# Patient Record
Sex: FEMALE | Race: BLACK OR AFRICAN AMERICAN | NOT HISPANIC OR LATINO | Employment: UNEMPLOYED | URBAN - METROPOLITAN AREA
[De-identification: names, ages, dates, MRNs, and addresses within clinical notes are randomized per-mention and may not be internally consistent; named-entity substitution may affect disease eponyms.]

---

## 2020-07-09 ENCOUNTER — TELEPHONE (OUTPATIENT)
Dept: NEUROLOGY | Facility: CLINIC | Age: 20
End: 2020-07-09

## 2020-07-09 NOTE — TELEPHONE ENCOUNTER
Told mother our medicaid panel is closed.              ----- Message from Cortney Hanks sent at 7/9/2020  9:35 AM CDT -----  Regarding: Call Back  Name of Who is Calling : Yusuf (Mother)    Yusuf (Mother) is requesting a call from staff in regards to being seen for seizures and getting established as a new patient sooner than what is available    .....Please contact to further discuss and advise.    Can the clinic reply by [facility] : No    What Number to Call Back :  [phone]

## 2020-09-28 ENCOUNTER — TELEPHONE (OUTPATIENT)
Dept: NEUROLOGY | Facility: CLINIC | Age: 20
End: 2020-09-28

## 2020-09-28 NOTE — TELEPHONE ENCOUNTER
----- Message from Michelle Vasquez sent at 9/28/2020  2:19 PM CDT -----  Contact: [facility]  - mother  Pts mother is asking for a call back in regards to scheduling the pt.  The pt is new to the dept and suffers with seizures.      I was not able to schedule the pt due to no availability     Contact info- [phone]    Left message on voicemail, patient is medicaid without a [facility] PCP. She will have to establish with an [facility] PCP before we can see her

## 2020-12-11 ENCOUNTER — TELEPHONE (OUTPATIENT)
Dept: NEUROLOGY | Facility: CLINIC | Age: 20
End: 2020-12-11

## 2021-03-02 ENCOUNTER — TELEPHONE (OUTPATIENT)
Dept: NEUROLOGY | Facility: CLINIC | Age: 21
End: 2021-03-02

## 2021-03-15 ENCOUNTER — TELEPHONE (OUTPATIENT)
Dept: NEUROLOGY | Facility: CLINIC | Age: 21
End: 2021-03-15

## 2021-03-17 ENCOUNTER — TELEPHONE (OUTPATIENT)
Dept: NEUROLOGY | Facility: CLINIC | Age: 21
End: 2021-03-17

## 2021-04-13 ENCOUNTER — TELEPHONE (OUTPATIENT)
Dept: NEUROLOGY | Facility: CLINIC | Age: 21
End: 2021-04-13

## 2021-05-05 ENCOUNTER — TELEPHONE (OUTPATIENT)
Dept: NEUROLOGY | Facility: CLINIC | Age: 21
End: 2021-05-05

## 2021-07-06 ENCOUNTER — PATIENT MESSAGE (OUTPATIENT)
Dept: ADMINISTRATIVE | Facility: HOSPITAL | Age: 21
End: 2021-07-06

## 2021-08-18 ENCOUNTER — DOCUMENTATION ONLY (OUTPATIENT)
Dept: NEUROLOGY | Facility: CLINIC | Age: 21
End: 2021-08-18

## 2021-08-18 ENCOUNTER — HOSPITAL ENCOUNTER (EMERGENCY)
Facility: HOSPITAL | Age: 21
Discharge: HOME | End: 2021-08-18
Attending: EMERGENCY MEDICINE
Payer: MEDICAID

## 2021-08-18 ENCOUNTER — TELEPHONE (OUTPATIENT)
Dept: NEUROLOGY | Facility: HOSPITAL | Age: 21
End: 2021-08-18

## 2021-08-18 ENCOUNTER — OFFICE VISIT (OUTPATIENT)
Dept: NEUROLOGY | Facility: CLINIC | Age: 21
End: 2021-08-18
Payer: MEDICAID

## 2021-08-18 VITALS
HEART RATE: 90 BPM | SYSTOLIC BLOOD PRESSURE: 133 MMHG | DIASTOLIC BLOOD PRESSURE: 82 MMHG | RESPIRATION RATE: 13 BRPM | WEIGHT: 119 LBS | OXYGEN SATURATION: 100 % | BODY MASS INDEX: 24.04 KG/M2 | TEMPERATURE: 98 F

## 2021-08-18 VITALS
BODY MASS INDEX: 24.09 KG/M2 | DIASTOLIC BLOOD PRESSURE: 88 MMHG | SYSTOLIC BLOOD PRESSURE: 125 MMHG | HEIGHT: 59 IN | HEART RATE: 84 BPM

## 2021-08-18 DIAGNOSIS — G40.219 LOCALIZATION-RELATED (FOCAL) (PARTIAL) SYMPTOMATIC EPILEPSY AND EPILEPTIC SYNDROMES WITH COMPLEX PARTIAL SEIZURES, INTRACTABLE, WITHOUT STATUS EPILEPTICUS: Primary | ICD-10-CM

## 2021-08-18 DIAGNOSIS — G43.111 INTRACTABLE MIGRAINE WITH AURA WITH STATUS MIGRAINOSUS: ICD-10-CM

## 2021-08-18 DIAGNOSIS — R56.9 SEIZURE: Primary | ICD-10-CM

## 2021-08-18 DIAGNOSIS — G80.9 CEREBRAL PALSY, UNSPECIFIED TYPE: ICD-10-CM

## 2021-08-18 DIAGNOSIS — G81.14 LEFT SPASTIC HEMIPARESIS: ICD-10-CM

## 2021-08-18 DIAGNOSIS — G91.9 HYDROCEPHALUS, UNSPECIFIED TYPE: ICD-10-CM

## 2021-08-18 DIAGNOSIS — Z98.2 S/P VP SHUNT: ICD-10-CM

## 2021-08-18 LAB
ALBUMIN SERPL BCP-MCNC: 4.3 G/DL (ref 3.5–5.2)
ALP SERPL-CCNC: 54 U/L (ref 55–135)
ALT SERPL W/O P-5'-P-CCNC: 7 U/L (ref 10–44)
ANION GAP SERPL CALC-SCNC: 11 MMOL/L (ref 8–16)
AST SERPL-CCNC: 10 U/L (ref 10–40)
B-HCG UR QL: NEGATIVE
BACTERIA #/AREA URNS AUTO: ABNORMAL /HPF
BASOPHILS # BLD AUTO: 0.03 K/UL (ref 0–0.2)
BASOPHILS NFR BLD: 0.4 % (ref 0–1.9)
BILIRUB SERPL-MCNC: 0.2 MG/DL (ref 0.1–1)
BILIRUB UR QL STRIP: NEGATIVE
BUN SERPL-MCNC: 9 MG/DL (ref 6–20)
BUN SERPL-MCNC: 9 MG/DL (ref 6–30)
CALCIUM SERPL-MCNC: 9.7 MG/DL (ref 8.7–10.5)
CHLORIDE SERPL-SCNC: 108 MMOL/L (ref 95–110)
CHLORIDE SERPL-SCNC: 109 MMOL/L (ref 95–110)
CLARITY UR REFRACT.AUTO: ABNORMAL
CO2 SERPL-SCNC: 24 MMOL/L (ref 23–29)
COLOR UR AUTO: YELLOW
CREAT SERPL-MCNC: 0.6 MG/DL (ref 0.5–1.4)
CREAT SERPL-MCNC: 0.7 MG/DL (ref 0.5–1.4)
CTP QC/QA: YES
CTP QC/QA: YES
DIFFERENTIAL METHOD: ABNORMAL
EOSINOPHIL # BLD AUTO: 0 K/UL (ref 0–0.5)
EOSINOPHIL NFR BLD: 0.2 % (ref 0–8)
ERYTHROCYTE [DISTWIDTH] IN BLOOD BY AUTOMATED COUNT: 19.9 % (ref 11.5–14.5)
EST. GFR  (AFRICAN AMERICAN): >60 ML/MIN/1.73 M^2
EST. GFR  (NON AFRICAN AMERICAN): >60 ML/MIN/1.73 M^2
GLUCOSE SERPL-MCNC: 108 MG/DL (ref 70–110)
GLUCOSE SERPL-MCNC: 113 MG/DL (ref 70–110)
GLUCOSE UR QL STRIP: NEGATIVE
HCT VFR BLD AUTO: 33.4 % (ref 37–48.5)
HCT VFR BLD CALC: 36 %PCV (ref 36–54)
HGB BLD-MCNC: 10.8 G/DL (ref 12–16)
HGB UR QL STRIP: ABNORMAL
HYALINE CASTS UR QL AUTO: 0 /LPF
IMM GRANULOCYTES # BLD AUTO: 0.02 K/UL (ref 0–0.04)
IMM GRANULOCYTES NFR BLD AUTO: 0.2 % (ref 0–0.5)
KETONES UR QL STRIP: ABNORMAL
LEUKOCYTE ESTERASE UR QL STRIP: NEGATIVE
LIPASE SERPL-CCNC: 20 U/L (ref 4–60)
LYMPHOCYTES # BLD AUTO: 1.4 K/UL (ref 1–4.8)
LYMPHOCYTES NFR BLD: 17.7 % (ref 18–48)
MCH RBC QN AUTO: 21.8 PG (ref 27–31)
MCHC RBC AUTO-ENTMCNC: 32.3 G/DL (ref 32–36)
MCV RBC AUTO: 68 FL (ref 82–98)
MICROSCOPIC COMMENT: ABNORMAL
MONOCYTES # BLD AUTO: 0.5 K/UL (ref 0.3–1)
MONOCYTES NFR BLD: 5.8 % (ref 4–15)
NEUTROPHILS # BLD AUTO: 6.2 K/UL (ref 1.8–7.7)
NEUTROPHILS NFR BLD: 75.7 % (ref 38–73)
NITRITE UR QL STRIP: NEGATIVE
NRBC BLD-RTO: 0 /100 WBC
PH UR STRIP: 7 [PH] (ref 5–8)
PLATELET # BLD AUTO: 273 K/UL (ref 150–450)
PMV BLD AUTO: 10.9 FL (ref 9.2–12.9)
POC IONIZED CALCIUM: 1.17 MMOL/L (ref 1.06–1.42)
POC TCO2 (MEASURED): 22 MMOL/L (ref 23–29)
POTASSIUM BLD-SCNC: 3.7 MMOL/L (ref 3.5–5.1)
POTASSIUM SERPL-SCNC: 3.6 MMOL/L (ref 3.5–5.1)
PROT SERPL-MCNC: 7.9 G/DL (ref 6–8.4)
PROT UR QL STRIP: ABNORMAL
RBC # BLD AUTO: 4.95 M/UL (ref 4–5.4)
RBC #/AREA URNS AUTO: >100 /HPF (ref 0–4)
SAMPLE: ABNORMAL
SARS-COV-2 RDRP RESP QL NAA+PROBE: NEGATIVE
SODIUM BLD-SCNC: 142 MMOL/L (ref 136–145)
SODIUM SERPL-SCNC: 143 MMOL/L (ref 136–145)
SP GR UR STRIP: 1.02 (ref 1–1.03)
SQUAMOUS #/AREA URNS AUTO: 1 /HPF
URN SPEC COLLECT METH UR: ABNORMAL
WBC # BLD AUTO: 8.14 K/UL (ref 3.9–12.7)
WBC #/AREA URNS AUTO: 5 /HPF (ref 0–5)

## 2021-08-18 PROCEDURE — 96361 HYDRATE IV INFUSION ADD-ON: CPT

## 2021-08-18 PROCEDURE — 86803 HEPATITIS C AB TEST: CPT | Performed by: EMERGENCY MEDICINE

## 2021-08-18 PROCEDURE — 99284 EMERGENCY DEPT VISIT MOD MDM: CPT | Mod: CS,,, | Performed by: EMERGENCY MEDICINE

## 2021-08-18 PROCEDURE — 80047 BASIC METABLC PNL IONIZED CA: CPT

## 2021-08-18 PROCEDURE — 25000003 PHARM REV CODE 250: Performed by: EMERGENCY MEDICINE

## 2021-08-18 PROCEDURE — 63600175 PHARM REV CODE 636 W HCPCS: Performed by: EMERGENCY MEDICINE

## 2021-08-18 PROCEDURE — 96374 THER/PROPH/DIAG INJ IV PUSH: CPT

## 2021-08-18 PROCEDURE — 87389 HIV-1 AG W/HIV-1&-2 AB AG IA: CPT | Performed by: EMERGENCY MEDICINE

## 2021-08-18 PROCEDURE — 93010 EKG 12-LEAD: ICD-10-PCS | Mod: ,,, | Performed by: INTERNAL MEDICINE

## 2021-08-18 PROCEDURE — 93005 ELECTROCARDIOGRAM TRACING: CPT

## 2021-08-18 PROCEDURE — 99285 EMERGENCY DEPT VISIT HI MDM: CPT | Mod: 25,27

## 2021-08-18 PROCEDURE — 99205 OFFICE O/P NEW HI 60 MIN: CPT | Mod: S$PBB,,, | Performed by: PSYCHIATRY & NEUROLOGY

## 2021-08-18 PROCEDURE — 99999 PR PBB SHADOW E&M-EST. PATIENT-LVL III: ICD-10-PCS | Mod: PBBFAC,,, | Performed by: PSYCHIATRY & NEUROLOGY

## 2021-08-18 PROCEDURE — 80183 DRUG SCRN QUANT OXCARBAZEPIN: CPT | Performed by: EMERGENCY MEDICINE

## 2021-08-18 PROCEDURE — 81001 URINALYSIS AUTO W/SCOPE: CPT | Performed by: EMERGENCY MEDICINE

## 2021-08-18 PROCEDURE — 99205 PR OFFICE/OUTPT VISIT, NEW, LEVL V, 60-74 MIN: ICD-10-PCS | Mod: S$PBB,,, | Performed by: PSYCHIATRY & NEUROLOGY

## 2021-08-18 PROCEDURE — 99284 PR EMERGENCY DEPT VISIT,LEVEL IV: ICD-10-PCS | Mod: CS,,, | Performed by: EMERGENCY MEDICINE

## 2021-08-18 PROCEDURE — 85025 COMPLETE CBC W/AUTO DIFF WBC: CPT | Performed by: EMERGENCY MEDICINE

## 2021-08-18 PROCEDURE — 81025 URINE PREGNANCY TEST: CPT | Performed by: EMERGENCY MEDICINE

## 2021-08-18 PROCEDURE — 99213 OFFICE O/P EST LOW 20 MIN: CPT | Mod: PBBFAC,25 | Performed by: PSYCHIATRY & NEUROLOGY

## 2021-08-18 PROCEDURE — 99999 PR PBB SHADOW E&M-EST. PATIENT-LVL III: CPT | Mod: PBBFAC,,, | Performed by: PSYCHIATRY & NEUROLOGY

## 2021-08-18 PROCEDURE — 80053 COMPREHEN METABOLIC PANEL: CPT | Performed by: EMERGENCY MEDICINE

## 2021-08-18 PROCEDURE — U0002 COVID-19 LAB TEST NON-CDC: HCPCS | Performed by: EMERGENCY MEDICINE

## 2021-08-18 PROCEDURE — 93010 ELECTROCARDIOGRAM REPORT: CPT | Mod: ,,, | Performed by: INTERNAL MEDICINE

## 2021-08-18 PROCEDURE — 80177 DRUG SCRN QUAN LEVETIRACETAM: CPT | Performed by: EMERGENCY MEDICINE

## 2021-08-18 PROCEDURE — 83690 ASSAY OF LIPASE: CPT | Performed by: EMERGENCY MEDICINE

## 2021-08-18 RX ORDER — OXCARBAZEPINE 60 MG/ML
600 SUSPENSION ORAL 2 TIMES DAILY
Qty: 1800 ML | Refills: 3 | Status: SHIPPED | OUTPATIENT
Start: 2021-08-18 | End: 2022-07-22 | Stop reason: SDUPTHER

## 2021-08-18 RX ORDER — FOLIC ACID 1 MG/1
1 TABLET ORAL DAILY
Qty: 90 TABLET | Refills: 3 | Status: SHIPPED | OUTPATIENT
Start: 2021-08-18 | End: 2022-08-15 | Stop reason: SDUPTHER

## 2021-08-18 RX ORDER — ONDANSETRON 4 MG/1
4 TABLET, ORALLY DISINTEGRATING ORAL EVERY 6 HOURS PRN
Qty: 12 TABLET | Refills: 0 | Status: SHIPPED | OUTPATIENT
Start: 2021-08-18 | End: 2021-08-18 | Stop reason: SDUPTHER

## 2021-08-18 RX ORDER — LEVETIRACETAM 100 MG/ML
1000 SOLUTION ORAL 2 TIMES DAILY
Qty: 1800 ML | Refills: 3 | Status: SHIPPED | OUTPATIENT
Start: 2021-08-18 | End: 2021-08-19 | Stop reason: SDUPTHER

## 2021-08-18 RX ORDER — MIDAZOLAM 5 MG/.1ML
5 SPRAY NASAL EVERY 5 MIN PRN
Qty: 4 EACH | Refills: 3 | Status: SHIPPED | OUTPATIENT
Start: 2021-08-18 | End: 2022-07-07 | Stop reason: ALTCHOICE

## 2021-08-18 RX ORDER — ONDANSETRON 4 MG/1
4 TABLET, ORALLY DISINTEGRATING ORAL EVERY 6 HOURS PRN
Qty: 12 TABLET | Refills: 0 | Status: SHIPPED | OUTPATIENT
Start: 2021-08-18 | End: 2021-08-20

## 2021-08-18 RX ORDER — ONDANSETRON 2 MG/ML
4 INJECTION INTRAMUSCULAR; INTRAVENOUS
Status: COMPLETED | OUTPATIENT
Start: 2021-08-18 | End: 2021-08-18

## 2021-08-18 RX ADMIN — ONDANSETRON 4 MG: 2 INJECTION INTRAMUSCULAR; INTRAVENOUS at 04:08

## 2021-08-18 RX ADMIN — SODIUM CHLORIDE 1000 ML: 0.9 INJECTION, SOLUTION INTRAVENOUS at 04:08

## 2021-08-19 ENCOUNTER — TELEPHONE (OUTPATIENT)
Dept: NEUROLOGY | Facility: HOSPITAL | Age: 21
End: 2021-08-19

## 2021-08-19 DIAGNOSIS — G40.219 LOCALIZATION-RELATED (FOCAL) (PARTIAL) SYMPTOMATIC EPILEPSY AND EPILEPTIC SYNDROMES WITH COMPLEX PARTIAL SEIZURES, INTRACTABLE, WITHOUT STATUS EPILEPTICUS: ICD-10-CM

## 2021-08-19 LAB — HIV 1+2 AB+HIV1 P24 AG SERPL QL IA: NEGATIVE

## 2021-08-19 RX ORDER — LEVETIRACETAM 100 MG/ML
1000 SOLUTION ORAL 2 TIMES DAILY
Qty: 1800 ML | Refills: 3 | Status: SHIPPED | OUTPATIENT
Start: 2021-08-19 | End: 2022-08-15 | Stop reason: SDUPTHER

## 2021-08-20 ENCOUNTER — TELEPHONE (OUTPATIENT)
Dept: NEUROSURGERY | Facility: CLINIC | Age: 21
End: 2021-08-20

## 2021-08-20 LAB — HCV AB SERPL QL IA: NEGATIVE

## 2021-08-21 LAB — OXCARBAZEPINE METABOLITE: 27 MCG/ML (ref 10–35)

## 2021-08-23 LAB — LEVETIRACETAM SERPL-MCNC: 9.8 UG/ML (ref 3–60)

## 2021-09-04 ENCOUNTER — PATIENT MESSAGE (OUTPATIENT)
Dept: NEUROLOGY | Facility: CLINIC | Age: 21
End: 2021-09-04

## 2021-09-04 DIAGNOSIS — G40.219 LOCALIZATION-RELATED (FOCAL) (PARTIAL) SYMPTOMATIC EPILEPSY AND EPILEPTIC SYNDROMES WITH COMPLEX PARTIAL SEIZURES, INTRACTABLE, WITHOUT STATUS EPILEPTICUS: Primary | ICD-10-CM

## 2021-10-04 ENCOUNTER — PATIENT MESSAGE (OUTPATIENT)
Dept: ADMINISTRATIVE | Facility: HOSPITAL | Age: 21
End: 2021-10-04

## 2021-10-08 ENCOUNTER — TELEPHONE (OUTPATIENT)
Dept: NEUROLOGY | Facility: CLINIC | Age: 21
End: 2021-10-08

## 2021-10-15 ENCOUNTER — TELEPHONE (OUTPATIENT)
Dept: NEUROSURGERY | Facility: CLINIC | Age: 21
End: 2021-10-15

## 2021-10-18 ENCOUNTER — OFFICE VISIT (OUTPATIENT)
Dept: NEUROSURGERY | Facility: CLINIC | Age: 21
End: 2021-10-18
Payer: MEDICAID

## 2021-10-18 ENCOUNTER — HOSPITAL ENCOUNTER (EMERGENCY)
Facility: HOSPITAL | Age: 21
Discharge: HOME | End: 2021-10-18
Attending: EMERGENCY MEDICINE
Payer: MEDICAID

## 2021-10-18 VITALS
RESPIRATION RATE: 18 BRPM | SYSTOLIC BLOOD PRESSURE: 139 MMHG | HEART RATE: 67 BPM | WEIGHT: 119 LBS | DIASTOLIC BLOOD PRESSURE: 74 MMHG | HEIGHT: 59 IN | OXYGEN SATURATION: 100 % | TEMPERATURE: 98 F | BODY MASS INDEX: 23.99 KG/M2

## 2021-10-18 DIAGNOSIS — R11.10 VOMITING, INTRACTABILITY OF VOMITING NOT SPECIFIED, PRESENCE OF NAUSEA NOT SPECIFIED, UNSPECIFIED VOMITING TYPE: ICD-10-CM

## 2021-10-18 DIAGNOSIS — G81.14 LEFT SPASTIC HEMIPARESIS: ICD-10-CM

## 2021-10-18 DIAGNOSIS — G91.9 HYDROCEPHALUS, UNSPECIFIED TYPE: Primary | ICD-10-CM

## 2021-10-18 DIAGNOSIS — Z98.2 VP (VENTRICULOPERITONEAL) SHUNT STATUS: Primary | ICD-10-CM

## 2021-10-18 DIAGNOSIS — G80.9 CEREBRAL PALSY, UNSPECIFIED TYPE: ICD-10-CM

## 2021-10-18 DIAGNOSIS — G40.909 NONINTRACTABLE EPILEPSY WITHOUT STATUS EPILEPTICUS, UNSPECIFIED EPILEPSY TYPE: ICD-10-CM

## 2021-10-18 DIAGNOSIS — G43.111 INTRACTABLE MIGRAINE WITH AURA WITH STATUS MIGRAINOSUS: ICD-10-CM

## 2021-10-18 PROCEDURE — 99204 PR OFFICE/OUTPT VISIT, NEW, LEVL IV, 45-59 MIN: ICD-10-PCS | Mod: S$PBB,,, | Performed by: STUDENT IN AN ORGANIZED HEALTH CARE EDUCATION/TRAINING PROGRAM

## 2021-10-18 PROCEDURE — 99284 EMERGENCY DEPT VISIT MOD MDM: CPT | Mod: 25

## 2021-10-18 PROCEDURE — 99999 PR PBB SHADOW E&M-EST. PATIENT-LVL III: CPT | Mod: PBBFAC,,, | Performed by: STUDENT IN AN ORGANIZED HEALTH CARE EDUCATION/TRAINING PROGRAM

## 2021-10-18 PROCEDURE — 99204 OFFICE O/P NEW MOD 45 MIN: CPT | Mod: S$PBB,,, | Performed by: STUDENT IN AN ORGANIZED HEALTH CARE EDUCATION/TRAINING PROGRAM

## 2021-10-18 PROCEDURE — 99284 PR EMERGENCY DEPT VISIT,LEVEL IV: ICD-10-PCS | Mod: ,,, | Performed by: EMERGENCY MEDICINE

## 2021-10-18 PROCEDURE — 25000003 PHARM REV CODE 250

## 2021-10-18 PROCEDURE — 99284 EMERGENCY DEPT VISIT MOD MDM: CPT | Mod: ,,, | Performed by: EMERGENCY MEDICINE

## 2021-10-18 PROCEDURE — 99213 OFFICE O/P EST LOW 20 MIN: CPT | Mod: PBBFAC,25,27 | Performed by: STUDENT IN AN ORGANIZED HEALTH CARE EDUCATION/TRAINING PROGRAM

## 2021-10-18 PROCEDURE — 99999 PR PBB SHADOW E&M-EST. PATIENT-LVL III: ICD-10-PCS | Mod: PBBFAC,,, | Performed by: STUDENT IN AN ORGANIZED HEALTH CARE EDUCATION/TRAINING PROGRAM

## 2021-10-18 RX ORDER — ACETAMINOPHEN 160 MG/5ML
325 SOLUTION ORAL
Status: DISCONTINUED | OUTPATIENT
Start: 2021-10-18 | End: 2021-10-18

## 2021-10-18 RX ORDER — ACETAMINOPHEN 160 MG/5ML
325 SOLUTION ORAL
Status: COMPLETED | OUTPATIENT
Start: 2021-10-18 | End: 2021-10-18

## 2021-10-18 RX ADMIN — ACETAMINOPHEN 326.4 MG: 160 SUSPENSION ORAL at 02:10

## 2021-11-09 ENCOUNTER — TELEPHONE (OUTPATIENT)
Dept: NEUROLOGY | Facility: HOSPITAL | Age: 21
End: 2021-11-09
Payer: MEDICAID

## 2021-11-09 DIAGNOSIS — G40.219 LOCALIZATION-RELATED (FOCAL) (PARTIAL) SYMPTOMATIC EPILEPSY AND EPILEPTIC SYNDROMES WITH COMPLEX PARTIAL SEIZURES, INTRACTABLE, WITHOUT STATUS EPILEPTICUS: ICD-10-CM

## 2021-11-12 ENCOUNTER — HOSPITAL ENCOUNTER (OUTPATIENT)
Dept: RADIOLOGY | Facility: HOSPITAL | Age: 21
Discharge: HOME | End: 2021-11-12
Attending: STUDENT IN AN ORGANIZED HEALTH CARE EDUCATION/TRAINING PROGRAM
Payer: MEDICAID

## 2021-11-12 DIAGNOSIS — G91.9 HYDROCEPHALUS, UNSPECIFIED TYPE: ICD-10-CM

## 2021-11-12 PROCEDURE — 70450 CT HEAD/BRAIN W/O DYE: CPT | Mod: TC

## 2021-11-12 PROCEDURE — 70250 XR SHUNT SERIES: ICD-10-PCS | Mod: 26,,, | Performed by: RADIOLOGY

## 2021-11-12 PROCEDURE — 74018 RADEX ABDOMEN 1 VIEW: CPT | Mod: TC,FY

## 2021-11-12 PROCEDURE — 70450 CT HEAD WITHOUT CONTRAST: ICD-10-PCS | Mod: 26,,, | Performed by: RADIOLOGY

## 2021-11-12 PROCEDURE — 72020 X-RAY EXAM OF SPINE 1 VIEW: CPT | Mod: 26,,, | Performed by: RADIOLOGY

## 2021-11-12 PROCEDURE — 70450 CT HEAD/BRAIN W/O DYE: CPT | Mod: 26,,, | Performed by: RADIOLOGY

## 2021-11-12 PROCEDURE — 71045 XR SHUNT SERIES: ICD-10-PCS | Mod: 26,,, | Performed by: RADIOLOGY

## 2021-11-12 PROCEDURE — 74018 XR SHUNT SERIES: ICD-10-PCS | Mod: 26,,, | Performed by: RADIOLOGY

## 2021-11-12 PROCEDURE — 74018 RADEX ABDOMEN 1 VIEW: CPT | Mod: 26,,, | Performed by: RADIOLOGY

## 2021-11-12 PROCEDURE — 70250 X-RAY EXAM OF SKULL: CPT | Mod: 26,,, | Performed by: RADIOLOGY

## 2021-11-12 PROCEDURE — 72020 XR SHUNT SERIES: ICD-10-PCS | Mod: 26,,, | Performed by: RADIOLOGY

## 2021-11-12 PROCEDURE — 71045 X-RAY EXAM CHEST 1 VIEW: CPT | Mod: 26,,, | Performed by: RADIOLOGY

## 2021-11-16 ENCOUNTER — OFFICE VISIT (OUTPATIENT)
Dept: NEUROSURGERY | Facility: CLINIC | Age: 21
End: 2021-11-16
Payer: MEDICAID

## 2021-11-16 ENCOUNTER — TELEPHONE (OUTPATIENT)
Dept: NEUROSURGERY | Facility: CLINIC | Age: 21
End: 2021-11-16
Payer: MEDICAID

## 2021-11-16 DIAGNOSIS — Z98.2 VP (VENTRICULOPERITONEAL) SHUNT STATUS: ICD-10-CM

## 2021-11-16 DIAGNOSIS — T85.618A SHUNT MALFUNCTION, INITIAL ENCOUNTER: Primary | ICD-10-CM

## 2021-11-16 DIAGNOSIS — R53.83 LETHARGY: ICD-10-CM

## 2021-11-16 DIAGNOSIS — G91.9 HYDROCEPHALUS, UNSPECIFIED TYPE: Primary | ICD-10-CM

## 2021-11-16 DIAGNOSIS — R11.2 NAUSEA AND VOMITING, INTRACTABILITY OF VOMITING NOT SPECIFIED, UNSPECIFIED VOMITING TYPE: ICD-10-CM

## 2021-11-16 DIAGNOSIS — G40.219 LOCALIZATION-RELATED (FOCAL) (PARTIAL) SYMPTOMATIC EPILEPSY AND EPILEPTIC SYNDROMES WITH COMPLEX PARTIAL SEIZURES, INTRACTABLE, WITHOUT STATUS EPILEPTICUS: ICD-10-CM

## 2021-11-16 DIAGNOSIS — G80.9 CEREBRAL PALSY, UNSPECIFIED TYPE: ICD-10-CM

## 2021-11-16 DIAGNOSIS — G81.14 LEFT SPASTIC HEMIPARESIS: ICD-10-CM

## 2021-11-16 PROCEDURE — 99212 OFFICE O/P EST SF 10 MIN: CPT | Mod: PBBFAC | Performed by: STUDENT IN AN ORGANIZED HEALTH CARE EDUCATION/TRAINING PROGRAM

## 2021-11-16 PROCEDURE — 99214 OFFICE O/P EST MOD 30 MIN: CPT | Mod: S$PBB,,, | Performed by: STUDENT IN AN ORGANIZED HEALTH CARE EDUCATION/TRAINING PROGRAM

## 2021-11-16 PROCEDURE — 99999 PR PBB SHADOW E&M-EST. PATIENT-LVL II: CPT | Mod: PBBFAC,,, | Performed by: STUDENT IN AN ORGANIZED HEALTH CARE EDUCATION/TRAINING PROGRAM

## 2021-11-16 PROCEDURE — 99999 PR PBB SHADOW E&M-EST. PATIENT-LVL II: ICD-10-PCS | Mod: PBBFAC,,, | Performed by: STUDENT IN AN ORGANIZED HEALTH CARE EDUCATION/TRAINING PROGRAM

## 2021-11-16 PROCEDURE — 99214 PR OFFICE/OUTPT VISIT, EST, LEVL IV, 30-39 MIN: ICD-10-PCS | Mod: S$PBB,,, | Performed by: STUDENT IN AN ORGANIZED HEALTH CARE EDUCATION/TRAINING PROGRAM

## 2021-11-18 ENCOUNTER — ANESTHESIA EVENT (OUTPATIENT)
Dept: SURGERY | Facility: HOSPITAL | Age: 21
DRG: 032 | End: 2021-11-18
Payer: MEDICAID

## 2021-11-18 ENCOUNTER — TELEPHONE (OUTPATIENT)
Dept: NEUROSURGERY | Facility: CLINIC | Age: 21
End: 2021-11-18
Payer: MEDICAID

## 2021-11-18 ENCOUNTER — PATIENT MESSAGE (OUTPATIENT)
Dept: NEUROSURGERY | Facility: CLINIC | Age: 21
End: 2021-11-18
Payer: MEDICAID

## 2021-11-19 ENCOUNTER — HOSPITAL ENCOUNTER (INPATIENT)
Facility: HOSPITAL | Age: 21
LOS: 5 days | Discharge: HOME | DRG: 032 | End: 2021-11-24
Attending: STUDENT IN AN ORGANIZED HEALTH CARE EDUCATION/TRAINING PROGRAM | Admitting: STUDENT IN AN ORGANIZED HEALTH CARE EDUCATION/TRAINING PROGRAM
Payer: MEDICAID

## 2021-11-19 ENCOUNTER — ANESTHESIA (OUTPATIENT)
Dept: SURGERY | Facility: HOSPITAL | Age: 21
DRG: 032 | End: 2021-11-19
Payer: MEDICAID

## 2021-11-19 DIAGNOSIS — R00.0 TACHYCARDIA: ICD-10-CM

## 2021-11-19 DIAGNOSIS — G91.9 HYDROCEPHALUS: ICD-10-CM

## 2021-11-19 DIAGNOSIS — Z98.2 S/P VP SHUNT: Primary | ICD-10-CM

## 2021-11-19 LAB
B-HCG UR QL: NEGATIVE
B-HCG UR QL: NEGATIVE
CTP QC/QA: YES
CTP QC/QA: YES
SARS-COV-2 RDRP RESP QL NAA+PROBE: NEGATIVE

## 2021-11-19 PROCEDURE — C1729 CATH, DRAINAGE: HCPCS | Performed by: STUDENT IN AN ORGANIZED HEALTH CARE EDUCATION/TRAINING PROGRAM

## 2021-11-19 PROCEDURE — 81025 URINE PREGNANCY TEST: CPT | Performed by: STUDENT IN AN ORGANIZED HEALTH CARE EDUCATION/TRAINING PROGRAM

## 2021-11-19 PROCEDURE — D9220A PRA ANESTHESIA: Mod: CRNA,,, | Performed by: NURSE ANESTHETIST, CERTIFIED REGISTERED

## 2021-11-19 PROCEDURE — 27201423 OPTIME MED/SURG SUP & DEVICES STERILE SUPPLY: Performed by: STUDENT IN AN ORGANIZED HEALTH CARE EDUCATION/TRAINING PROGRAM

## 2021-11-19 PROCEDURE — 25000003 PHARM REV CODE 250: Performed by: STUDENT IN AN ORGANIZED HEALTH CARE EDUCATION/TRAINING PROGRAM

## 2021-11-19 PROCEDURE — 36000710: Performed by: STUDENT IN AN ORGANIZED HEALTH CARE EDUCATION/TRAINING PROGRAM

## 2021-11-19 PROCEDURE — U0002 COVID-19 LAB TEST NON-CDC: HCPCS | Performed by: STUDENT IN AN ORGANIZED HEALTH CARE EDUCATION/TRAINING PROGRAM

## 2021-11-19 PROCEDURE — D9220A PRA ANESTHESIA: ICD-10-PCS | Mod: ANES,,, | Performed by: ANESTHESIOLOGY

## 2021-11-19 PROCEDURE — 25000003 PHARM REV CODE 250: Performed by: NURSE ANESTHETIST, CERTIFIED REGISTERED

## 2021-11-19 PROCEDURE — 36000711: Performed by: STUDENT IN AN ORGANIZED HEALTH CARE EDUCATION/TRAINING PROGRAM

## 2021-11-19 PROCEDURE — 62230 REPLACE/REVISE BRAIN SHUNT: CPT | Mod: ,,, | Performed by: STUDENT IN AN ORGANIZED HEALTH CARE EDUCATION/TRAINING PROGRAM

## 2021-11-19 PROCEDURE — 63600175 PHARM REV CODE 636 W HCPCS: Performed by: NURSE ANESTHETIST, CERTIFIED REGISTERED

## 2021-11-19 PROCEDURE — 71000033 HC RECOVERY, INTIAL HOUR: Performed by: STUDENT IN AN ORGANIZED HEALTH CARE EDUCATION/TRAINING PROGRAM

## 2021-11-19 PROCEDURE — 71000015 HC POSTOP RECOV 1ST HR: Performed by: STUDENT IN AN ORGANIZED HEALTH CARE EDUCATION/TRAINING PROGRAM

## 2021-11-19 PROCEDURE — 27201037 HC PRESSURE MONITORING SET UP

## 2021-11-19 PROCEDURE — D9220A PRA ANESTHESIA: Mod: ANES,,, | Performed by: ANESTHESIOLOGY

## 2021-11-19 PROCEDURE — 12000002 HC ACUTE/MED SURGE SEMI-PRIVATE ROOM

## 2021-11-19 PROCEDURE — 27800903 OPTIME MED/SURG SUP & DEVICES OTHER IMPLANTS: Performed by: STUDENT IN AN ORGANIZED HEALTH CARE EDUCATION/TRAINING PROGRAM

## 2021-11-19 PROCEDURE — D9220A PRA ANESTHESIA: ICD-10-PCS | Mod: CRNA,,, | Performed by: NURSE ANESTHETIST, CERTIFIED REGISTERED

## 2021-11-19 PROCEDURE — 63600175 PHARM REV CODE 636 W HCPCS: Performed by: STUDENT IN AN ORGANIZED HEALTH CARE EDUCATION/TRAINING PROGRAM

## 2021-11-19 PROCEDURE — 37000009 HC ANESTHESIA EA ADD 15 MINS: Performed by: STUDENT IN AN ORGANIZED HEALTH CARE EDUCATION/TRAINING PROGRAM

## 2021-11-19 PROCEDURE — 37000008 HC ANESTHESIA 1ST 15 MINUTES: Performed by: STUDENT IN AN ORGANIZED HEALTH CARE EDUCATION/TRAINING PROGRAM

## 2021-11-19 PROCEDURE — 62230 PR REPLACEMENT/REVISION,CSF SHUNT: ICD-10-PCS | Mod: ,,, | Performed by: STUDENT IN AN ORGANIZED HEALTH CARE EDUCATION/TRAINING PROGRAM

## 2021-11-19 DEVICE — CATH VENTRICULAR INNERVISION: Type: IMPLANTABLE DEVICE | Site: SCALP | Status: FUNCTIONAL

## 2021-11-19 DEVICE — SHUNT PROGRAMMABLE CHPU: Type: IMPLANTABLE DEVICE | Site: SCALP | Status: FUNCTIONAL

## 2021-11-19 RX ORDER — CHOLECALCIFEROL (VITAMIN D3) 25 MCG
4000 TABLET ORAL DAILY
Status: DISCONTINUED | OUTPATIENT
Start: 2021-11-20 | End: 2021-11-24 | Stop reason: HOSPADM

## 2021-11-19 RX ORDER — HEPARIN SODIUM 5000 [USP'U]/ML
5000 INJECTION, SOLUTION INTRAVENOUS; SUBCUTANEOUS EVERY 8 HOURS
Status: DISCONTINUED | OUTPATIENT
Start: 2021-11-20 | End: 2021-11-22

## 2021-11-19 RX ORDER — NEOSTIGMINE METHYLSULFATE 0.5 MG/ML
INJECTION, SOLUTION INTRAVENOUS
Status: DISCONTINUED | OUTPATIENT
Start: 2021-11-19 | End: 2021-11-19

## 2021-11-19 RX ORDER — DIAZEPAM 10 MG/2ML
5 INJECTION INTRAMUSCULAR
Status: DISCONTINUED | OUTPATIENT
Start: 2021-11-20 | End: 2021-11-24 | Stop reason: HOSPADM

## 2021-11-19 RX ORDER — CEFAZOLIN SODIUM 1 G/3ML
2 INJECTION, POWDER, FOR SOLUTION INTRAMUSCULAR; INTRAVENOUS
Status: COMPLETED | OUTPATIENT
Start: 2021-11-20 | End: 2021-11-20

## 2021-11-19 RX ORDER — HYDROCODONE BITARTRATE AND ACETAMINOPHEN 5; 325 MG/1; MG/1
1 TABLET ORAL EVERY 4 HOURS PRN
Status: DISCONTINUED | OUTPATIENT
Start: 2021-11-19 | End: 2021-11-24 | Stop reason: HOSPADM

## 2021-11-19 RX ORDER — ACETAMINOPHEN 10 MG/ML
INJECTION, SOLUTION INTRAVENOUS
Status: DISCONTINUED | OUTPATIENT
Start: 2021-11-19 | End: 2021-11-19

## 2021-11-19 RX ORDER — LEVETIRACETAM 100 MG/ML
1000 SOLUTION ORAL 2 TIMES DAILY
Refills: 3 | Status: DISCONTINUED | OUTPATIENT
Start: 2021-11-19 | End: 2021-11-20

## 2021-11-19 RX ORDER — ONDANSETRON 2 MG/ML
4 INJECTION INTRAMUSCULAR; INTRAVENOUS EVERY 12 HOURS PRN
Status: DISCONTINUED | OUTPATIENT
Start: 2021-11-19 | End: 2021-11-20

## 2021-11-19 RX ORDER — DIAZEPAM 2.5 MG/.5ML
10 GEL RECTAL
Status: DISCONTINUED | OUTPATIENT
Start: 2021-11-19 | End: 2021-11-19

## 2021-11-19 RX ORDER — MIDAZOLAM HYDROCHLORIDE 1 MG/ML
INJECTION INTRAMUSCULAR; INTRAVENOUS
Status: DISCONTINUED | OUTPATIENT
Start: 2021-11-19 | End: 2021-11-19

## 2021-11-19 RX ORDER — ROCURONIUM BROMIDE 10 MG/ML
INJECTION, SOLUTION INTRAVENOUS
Status: DISCONTINUED | OUTPATIENT
Start: 2021-11-19 | End: 2021-11-19

## 2021-11-19 RX ORDER — OXCARBAZEPINE 60 MG/ML
600 SUSPENSION ORAL 2 TIMES DAILY
Status: DISCONTINUED | OUTPATIENT
Start: 2021-11-19 | End: 2021-11-23

## 2021-11-19 RX ORDER — CEFTRIAXONE 1 G/1
INJECTION, POWDER, FOR SOLUTION INTRAMUSCULAR; INTRAVENOUS
Status: DISCONTINUED | OUTPATIENT
Start: 2021-11-19 | End: 2021-11-19 | Stop reason: HOSPADM

## 2021-11-19 RX ORDER — HYDROMORPHONE HYDROCHLORIDE 1 MG/ML
0.2 INJECTION, SOLUTION INTRAMUSCULAR; INTRAVENOUS; SUBCUTANEOUS EVERY 5 MIN PRN
Status: DISCONTINUED | OUTPATIENT
Start: 2021-11-19 | End: 2021-11-20 | Stop reason: HOSPADM

## 2021-11-19 RX ORDER — LORAZEPAM 2 MG/ML
0.25 INJECTION INTRAMUSCULAR ONCE AS NEEDED
Status: DISCONTINUED | OUTPATIENT
Start: 2021-11-19 | End: 2021-11-20 | Stop reason: HOSPADM

## 2021-11-19 RX ORDER — SODIUM CHLORIDE 0.9 % (FLUSH) 0.9 %
3 SYRINGE (ML) INJECTION
Status: DISCONTINUED | OUTPATIENT
Start: 2021-11-19 | End: 2021-11-24 | Stop reason: HOSPADM

## 2021-11-19 RX ORDER — FOLIC ACID 1 MG/1
1 TABLET ORAL DAILY
Status: DISCONTINUED | OUTPATIENT
Start: 2021-11-20 | End: 2021-11-24 | Stop reason: HOSPADM

## 2021-11-19 RX ORDER — FENTANYL CITRATE 50 UG/ML
INJECTION, SOLUTION INTRAMUSCULAR; INTRAVENOUS
Status: DISCONTINUED | OUTPATIENT
Start: 2021-11-19 | End: 2021-11-19

## 2021-11-19 RX ORDER — LIDOCAINE HYDROCHLORIDE 20 MG/ML
INJECTION, SOLUTION EPIDURAL; INFILTRATION; INTRACAUDAL; PERINEURAL
Status: DISCONTINUED | OUTPATIENT
Start: 2021-11-19 | End: 2021-11-19

## 2021-11-19 RX ORDER — ACETAMINOPHEN 325 MG/1
650 TABLET ORAL EVERY 4 HOURS PRN
Status: DISCONTINUED | OUTPATIENT
Start: 2021-11-19 | End: 2021-11-24 | Stop reason: HOSPADM

## 2021-11-19 RX ORDER — DEXMEDETOMIDINE HYDROCHLORIDE 100 UG/ML
INJECTION, SOLUTION INTRAVENOUS
Status: DISCONTINUED | OUTPATIENT
Start: 2021-11-19 | End: 2021-11-19

## 2021-11-19 RX ORDER — PROPOFOL 10 MG/ML
VIAL (ML) INTRAVENOUS
Status: DISCONTINUED | OUTPATIENT
Start: 2021-11-19 | End: 2021-11-19

## 2021-11-19 RX ORDER — DIPHENHYDRAMINE HYDROCHLORIDE 50 MG/ML
INJECTION INTRAMUSCULAR; INTRAVENOUS
Status: DISCONTINUED | OUTPATIENT
Start: 2021-11-19 | End: 2021-11-19

## 2021-11-19 RX ORDER — VANCOMYCIN HYDROCHLORIDE 500 MG/10ML
INJECTION, POWDER, LYOPHILIZED, FOR SOLUTION INTRAVENOUS
Status: DISCONTINUED | OUTPATIENT
Start: 2021-11-19 | End: 2021-11-19 | Stop reason: HOSPADM

## 2021-11-19 RX ORDER — DEXAMETHASONE SODIUM PHOSPHATE 4 MG/ML
INJECTION, SOLUTION INTRA-ARTICULAR; INTRALESIONAL; INTRAMUSCULAR; INTRAVENOUS; SOFT TISSUE
Status: DISCONTINUED | OUTPATIENT
Start: 2021-11-19 | End: 2021-11-19

## 2021-11-19 RX ORDER — MUPIROCIN 20 MG/G
1 OINTMENT TOPICAL 2 TIMES DAILY
Status: DISCONTINUED | OUTPATIENT
Start: 2021-11-19 | End: 2021-11-20 | Stop reason: HOSPADM

## 2021-11-19 RX ORDER — CEFAZOLIN SODIUM 1 G/3ML
2 INJECTION, POWDER, FOR SOLUTION INTRAMUSCULAR; INTRAVENOUS
Status: COMPLETED | OUTPATIENT
Start: 2021-11-19 | End: 2021-11-19

## 2021-11-19 RX ORDER — FAMOTIDINE 10 MG/ML
INJECTION INTRAVENOUS
Status: DISCONTINUED | OUTPATIENT
Start: 2021-11-19 | End: 2021-11-19

## 2021-11-19 RX ORDER — LISINOPRIL 5 MG/1
10 TABLET ORAL DAILY
Status: DISCONTINUED | OUTPATIENT
Start: 2021-11-20 | End: 2021-11-24 | Stop reason: HOSPADM

## 2021-11-19 RX ORDER — ONDANSETRON 2 MG/ML
INJECTION INTRAMUSCULAR; INTRAVENOUS
Status: DISCONTINUED | OUTPATIENT
Start: 2021-11-19 | End: 2021-11-19

## 2021-11-19 RX ORDER — MUPIROCIN 20 MG/G
OINTMENT TOPICAL
Status: DISCONTINUED | OUTPATIENT
Start: 2021-11-19 | End: 2021-11-20 | Stop reason: HOSPADM

## 2021-11-19 RX ORDER — SODIUM CHLORIDE 9 MG/ML
INJECTION, SOLUTION INTRAVENOUS CONTINUOUS
Status: DISCONTINUED | OUTPATIENT
Start: 2021-11-19 | End: 2021-11-20

## 2021-11-19 RX ORDER — LIDOCAINE HYDROCHLORIDE AND EPINEPHRINE 10; 10 MG/ML; UG/ML
INJECTION, SOLUTION INFILTRATION; PERINEURAL
Status: DISCONTINUED | OUTPATIENT
Start: 2021-11-19 | End: 2021-11-19 | Stop reason: HOSPADM

## 2021-11-19 RX ADMIN — ROCURONIUM BROMIDE 30 MG: 10 INJECTION, SOLUTION INTRAVENOUS at 07:11

## 2021-11-19 RX ADMIN — ACETAMINOPHEN 800 MG: 10 INJECTION INTRAVENOUS at 08:11

## 2021-11-19 RX ADMIN — MIDAZOLAM HYDROCHLORIDE 2 MG: 1 INJECTION, SOLUTION INTRAMUSCULAR; INTRAVENOUS at 07:11

## 2021-11-19 RX ADMIN — SODIUM CHLORIDE, SODIUM GLUCONATE, SODIUM ACETATE, POTASSIUM CHLORIDE, MAGNESIUM CHLORIDE, SODIUM PHOSPHATE, DIBASIC, AND POTASSIUM PHOSPHATE: .53; .5; .37; .037; .03; .012; .00082 INJECTION, SOLUTION INTRAVENOUS at 07:11

## 2021-11-19 RX ADMIN — DEXMEDETOMIDINE HYDROCHLORIDE 4 MCG: 100 INJECTION, SOLUTION INTRAVENOUS at 08:11

## 2021-11-19 RX ADMIN — LIDOCAINE HYDROCHLORIDE 50 MG: 20 INJECTION, SOLUTION EPIDURAL; INFILTRATION; INTRACAUDAL; PERINEURAL at 07:11

## 2021-11-19 RX ADMIN — CEFAZOLIN 2 G: 330 INJECTION, POWDER, FOR SOLUTION INTRAMUSCULAR; INTRAVENOUS at 07:11

## 2021-11-19 RX ADMIN — PROPOFOL 100 MG: 10 INJECTION, EMULSION INTRAVENOUS at 07:11

## 2021-11-19 RX ADMIN — DEXAMETHASONE SODIUM PHOSPHATE 8 MG: 4 INJECTION, SOLUTION INTRAMUSCULAR; INTRAVENOUS at 07:11

## 2021-11-19 RX ADMIN — ONDANSETRON 4 MG: 2 INJECTION INTRAMUSCULAR; INTRAVENOUS at 07:11

## 2021-11-19 RX ADMIN — DIPHENHYDRAMINE HYDROCHLORIDE 12.5 MG: 50 INJECTION INTRAMUSCULAR; INTRAVENOUS at 09:11

## 2021-11-19 RX ADMIN — FENTANYL CITRATE 50 MCG: 50 INJECTION, SOLUTION INTRAMUSCULAR; INTRAVENOUS at 07:11

## 2021-11-19 RX ADMIN — GLYCOPYRROLATE 0.4 MG: 0.2 INJECTION, SOLUTION INTRAMUSCULAR; INTRAVITREAL at 09:11

## 2021-11-19 RX ADMIN — NEOSTIGMINE METHYLSULFATE 4 MG: 0.5 INJECTION INTRAVENOUS at 09:11

## 2021-11-19 RX ADMIN — ROCURONIUM BROMIDE 20 MG: 10 INJECTION, SOLUTION INTRAVENOUS at 08:11

## 2021-11-19 RX ADMIN — DEXMEDETOMIDINE HYDROCHLORIDE 12 MCG: 100 INJECTION, SOLUTION INTRAVENOUS at 09:11

## 2021-11-19 RX ADMIN — FAMOTIDINE 20 MG: 10 INJECTION, SOLUTION INTRAVENOUS at 08:11

## 2021-11-20 LAB
ANION GAP SERPL CALC-SCNC: 11 MMOL/L (ref 8–16)
BASOPHILS # BLD AUTO: 0.02 K/UL (ref 0–0.2)
BASOPHILS NFR BLD: 0.2 % (ref 0–1.9)
BUN SERPL-MCNC: 10 MG/DL (ref 6–20)
CALCIUM SERPL-MCNC: 9.4 MG/DL (ref 8.7–10.5)
CHLORIDE SERPL-SCNC: 108 MMOL/L (ref 95–110)
CO2 SERPL-SCNC: 22 MMOL/L (ref 23–29)
CREAT SERPL-MCNC: 0.7 MG/DL (ref 0.5–1.4)
DIFFERENTIAL METHOD: ABNORMAL
EOSINOPHIL # BLD AUTO: 0 K/UL (ref 0–0.5)
EOSINOPHIL NFR BLD: 0 % (ref 0–8)
ERYTHROCYTE [DISTWIDTH] IN BLOOD BY AUTOMATED COUNT: 17.7 % (ref 11.5–14.5)
EST. GFR  (AFRICAN AMERICAN): >60 ML/MIN/1.73 M^2
EST. GFR  (NON AFRICAN AMERICAN): >60 ML/MIN/1.73 M^2
GLUCOSE SERPL-MCNC: 84 MG/DL (ref 70–110)
HCT VFR BLD AUTO: 31.3 % (ref 37–48.5)
HGB BLD-MCNC: 10.1 G/DL (ref 12–16)
IMM GRANULOCYTES # BLD AUTO: 0.09 K/UL (ref 0–0.04)
IMM GRANULOCYTES NFR BLD AUTO: 0.8 % (ref 0–0.5)
LYMPHOCYTES # BLD AUTO: 2.2 K/UL (ref 1–4.8)
LYMPHOCYTES NFR BLD: 20.4 % (ref 18–48)
MCH RBC QN AUTO: 21.8 PG (ref 27–31)
MCHC RBC AUTO-ENTMCNC: 32.3 G/DL (ref 32–36)
MCV RBC AUTO: 68 FL (ref 82–98)
MONOCYTES # BLD AUTO: 0.7 K/UL (ref 0.3–1)
MONOCYTES NFR BLD: 6.8 % (ref 4–15)
NEUTROPHILS # BLD AUTO: 7.7 K/UL (ref 1.8–7.7)
NEUTROPHILS NFR BLD: 71.8 % (ref 38–73)
NRBC BLD-RTO: 0 /100 WBC
PLATELET # BLD AUTO: 355 K/UL (ref 150–450)
PMV BLD AUTO: 10.9 FL (ref 9.2–12.9)
POTASSIUM SERPL-SCNC: 3.7 MMOL/L (ref 3.5–5.1)
RBC # BLD AUTO: 4.63 M/UL (ref 4–5.4)
SODIUM SERPL-SCNC: 141 MMOL/L (ref 136–145)
WBC # BLD AUTO: 10.76 K/UL (ref 3.9–12.7)

## 2021-11-20 PROCEDURE — 97535 SELF CARE MNGMENT TRAINING: CPT

## 2021-11-20 PROCEDURE — 25000003 PHARM REV CODE 250: Performed by: STUDENT IN AN ORGANIZED HEALTH CARE EDUCATION/TRAINING PROGRAM

## 2021-11-20 PROCEDURE — 11000001 HC ACUTE MED/SURG PRIVATE ROOM

## 2021-11-20 PROCEDURE — 97116 GAIT TRAINING THERAPY: CPT

## 2021-11-20 PROCEDURE — 97165 OT EVAL LOW COMPLEX 30 MIN: CPT

## 2021-11-20 PROCEDURE — 36415 COLL VENOUS BLD VENIPUNCTURE: CPT | Performed by: STUDENT IN AN ORGANIZED HEALTH CARE EDUCATION/TRAINING PROGRAM

## 2021-11-20 PROCEDURE — 85025 COMPLETE CBC W/AUTO DIFF WBC: CPT | Performed by: STUDENT IN AN ORGANIZED HEALTH CARE EDUCATION/TRAINING PROGRAM

## 2021-11-20 PROCEDURE — 63600175 PHARM REV CODE 636 W HCPCS: Performed by: STUDENT IN AN ORGANIZED HEALTH CARE EDUCATION/TRAINING PROGRAM

## 2021-11-20 PROCEDURE — 97161 PT EVAL LOW COMPLEX 20 MIN: CPT

## 2021-11-20 PROCEDURE — 80048 BASIC METABOLIC PNL TOTAL CA: CPT | Performed by: STUDENT IN AN ORGANIZED HEALTH CARE EDUCATION/TRAINING PROGRAM

## 2021-11-20 RX ORDER — FOLIC ACID 1 MG/1
1 TABLET ORAL ONCE
Status: DISCONTINUED | OUTPATIENT
Start: 2021-11-20 | End: 2021-11-22

## 2021-11-20 RX ORDER — LEVETIRACETAM 500 MG/5ML
1000 INJECTION, SOLUTION, CONCENTRATE INTRAVENOUS EVERY 12 HOURS
Status: DISCONTINUED | OUTPATIENT
Start: 2021-11-20 | End: 2021-11-24 | Stop reason: HOSPADM

## 2021-11-20 RX ORDER — SODIUM CHLORIDE 9 MG/ML
INJECTION, SOLUTION INTRAVENOUS CONTINUOUS
Status: DISCONTINUED | OUTPATIENT
Start: 2021-11-20 | End: 2021-11-21

## 2021-11-20 RX ORDER — ACETAMINOPHEN 325 MG/1
650 TABLET ORAL ONCE
Status: DISCONTINUED | OUTPATIENT
Start: 2021-11-20 | End: 2021-11-22

## 2021-11-20 RX ORDER — LISINOPRIL 5 MG/1
10 TABLET ORAL ONCE
Status: DISCONTINUED | OUTPATIENT
Start: 2021-11-20 | End: 2021-11-22

## 2021-11-20 RX ORDER — ONDANSETRON 2 MG/ML
4 INJECTION INTRAMUSCULAR; INTRAVENOUS EVERY 8 HOURS PRN
Status: DISCONTINUED | OUTPATIENT
Start: 2021-11-20 | End: 2021-11-24 | Stop reason: HOSPADM

## 2021-11-20 RX ADMIN — HYDROCODONE BITARTRATE AND ACETAMINOPHEN 1 TABLET: 5; 325 TABLET ORAL at 09:11

## 2021-11-20 RX ADMIN — CEFAZOLIN 2 G: 330 INJECTION, POWDER, FOR SOLUTION INTRAMUSCULAR; INTRAVENOUS at 04:11

## 2021-11-20 RX ADMIN — Medication 4000 UNITS: at 10:11

## 2021-11-20 RX ADMIN — PERAMPANEL 8 MG: 2 TABLET ORAL at 09:11

## 2021-11-20 RX ADMIN — FOLIC ACID 1 MG: 1 TABLET ORAL at 10:11

## 2021-11-20 RX ADMIN — LEVETIRACETAM 1000 MG: 500 INJECTION, SOLUTION INTRAVENOUS at 12:11

## 2021-11-20 RX ADMIN — LEVETIRACETAM 1000 MG: 500 INJECTION, SOLUTION INTRAVENOUS at 09:11

## 2021-11-20 RX ADMIN — LISINOPRIL 10 MG: 5 TABLET ORAL at 10:11

## 2021-11-20 RX ADMIN — ACETAMINOPHEN 650 MG: 325 TABLET ORAL at 10:11

## 2021-11-20 RX ADMIN — LEVETIRACETAM 1000 MG: 500 SOLUTION ORAL at 10:11

## 2021-11-20 RX ADMIN — HEPARIN SODIUM 5000 UNITS: 5000 INJECTION INTRAVENOUS; SUBCUTANEOUS at 09:11

## 2021-11-20 RX ADMIN — PROMETHAZINE HYDROCHLORIDE 25 MG: 25 INJECTION INTRAMUSCULAR; INTRAVENOUS at 04:11

## 2021-11-20 RX ADMIN — HEPARIN SODIUM 5000 UNITS: 5000 INJECTION INTRAVENOUS; SUBCUTANEOUS at 05:11

## 2021-11-20 RX ADMIN — SODIUM CHLORIDE: 0.9 INJECTION, SOLUTION INTRAVENOUS at 08:11

## 2021-11-20 RX ADMIN — HEPARIN SODIUM 5000 UNITS: 5000 INJECTION INTRAVENOUS; SUBCUTANEOUS at 02:11

## 2021-11-20 RX ADMIN — LEVETIRACETAM 1000 MG: 500 SOLUTION ORAL at 01:11

## 2021-11-20 RX ADMIN — SODIUM CHLORIDE: 0.9 INJECTION, SOLUTION INTRAVENOUS at 02:11

## 2021-11-20 RX ADMIN — OXCARBAZEPINE 600 MG: 300 SUSPENSION ORAL at 01:11

## 2021-11-20 RX ADMIN — OXCARBAZEPINE 600 MG: 300 SUSPENSION ORAL at 09:11

## 2021-11-20 RX ADMIN — ONDANSETRON 4 MG: 2 INJECTION INTRAMUSCULAR; INTRAVENOUS at 10:11

## 2021-11-20 RX ADMIN — PERAMPANEL 8 MG: 2 TABLET ORAL at 01:11

## 2021-11-20 RX ADMIN — CEFAZOLIN 2 G: 330 INJECTION, POWDER, FOR SOLUTION INTRAMUSCULAR; INTRAVENOUS at 12:11

## 2021-11-21 PROCEDURE — 0001A HC IMMUNIZ ADMIN, SARS-COV-2 COVID-19 VACC, 30MCG/0.3ML, 1ST DOSE: CPT | Performed by: STUDENT IN AN ORGANIZED HEALTH CARE EDUCATION/TRAINING PROGRAM

## 2021-11-21 PROCEDURE — 25000003 PHARM REV CODE 250: Performed by: STUDENT IN AN ORGANIZED HEALTH CARE EDUCATION/TRAINING PROGRAM

## 2021-11-21 PROCEDURE — 11000001 HC ACUTE MED/SURG PRIVATE ROOM

## 2021-11-21 PROCEDURE — 63600175 PHARM REV CODE 636 W HCPCS: Performed by: STUDENT IN AN ORGANIZED HEALTH CARE EDUCATION/TRAINING PROGRAM

## 2021-11-21 PROCEDURE — 91300 PHARM REV CODE 636 W HCPCS: CPT | Performed by: STUDENT IN AN ORGANIZED HEALTH CARE EDUCATION/TRAINING PROGRAM

## 2021-11-21 RX ORDER — ONDANSETRON 4 MG/1
4 TABLET, FILM COATED ORAL 2 TIMES DAILY
Qty: 30 TABLET | Refills: 0 | Status: SHIPPED | OUTPATIENT
Start: 2021-11-21 | End: 2021-12-06

## 2021-11-21 RX ORDER — HYDROCODONE BITARTRATE AND ACETAMINOPHEN 5; 325 MG/1; MG/1
1 TABLET ORAL EVERY 4 HOURS PRN
Qty: 30 TABLET | Refills: 0 | Status: SHIPPED | OUTPATIENT
Start: 2021-11-21 | End: 2022-03-29

## 2021-11-21 RX ORDER — SODIUM CHLORIDE 9 MG/ML
INJECTION, SOLUTION INTRAVENOUS CONTINUOUS
Status: DISCONTINUED | OUTPATIENT
Start: 2021-11-21 | End: 2021-11-22

## 2021-11-21 RX ADMIN — LEVETIRACETAM 1000 MG: 500 INJECTION, SOLUTION INTRAVENOUS at 08:11

## 2021-11-21 RX ADMIN — HEPARIN SODIUM 5000 UNITS: 5000 INJECTION INTRAVENOUS; SUBCUTANEOUS at 02:11

## 2021-11-21 RX ADMIN — Medication 4000 UNITS: at 09:11

## 2021-11-21 RX ADMIN — LISINOPRIL 10 MG: 5 TABLET ORAL at 09:11

## 2021-11-21 RX ADMIN — OXCARBAZEPINE 600 MG: 300 SUSPENSION ORAL at 08:11

## 2021-11-21 RX ADMIN — ONDANSETRON 4 MG: 2 INJECTION INTRAMUSCULAR; INTRAVENOUS at 06:11

## 2021-11-21 RX ADMIN — SODIUM CHLORIDE: 0.9 INJECTION, SOLUTION INTRAVENOUS at 06:11

## 2021-11-21 RX ADMIN — LEVETIRACETAM 1000 MG: 500 INJECTION, SOLUTION INTRAVENOUS at 09:11

## 2021-11-21 RX ADMIN — HEPARIN SODIUM 5000 UNITS: 5000 INJECTION INTRAVENOUS; SUBCUTANEOUS at 05:11

## 2021-11-21 RX ADMIN — OXCARBAZEPINE 600 MG: 300 SUSPENSION ORAL at 09:11

## 2021-11-21 RX ADMIN — RNA INGREDIENT BNT-162B2 0.3 ML: 0.23 INJECTION, SUSPENSION INTRAMUSCULAR at 03:11

## 2021-11-21 RX ADMIN — PERAMPANEL 8 MG: 2 TABLET ORAL at 08:11

## 2021-11-21 RX ADMIN — FOLIC ACID 1 MG: 1 TABLET ORAL at 09:11

## 2021-11-22 ENCOUNTER — ANESTHESIA EVENT (OUTPATIENT)
Dept: SURGERY | Facility: HOSPITAL | Age: 21
DRG: 032 | End: 2021-11-22
Payer: MEDICAID

## 2021-11-22 LAB
ABO + RH BLD: NORMAL
APTT BLDCRRT: 26.4 SEC (ref 21–32)
BLD GP AB SCN CELLS X3 SERPL QL: NORMAL
INR PPP: 1.1 (ref 0.8–1.2)
PROTHROMBIN TIME: 11.9 SEC (ref 9–12.5)
SARS-COV-2 RNA RESP QL NAA+PROBE: NOT DETECTED
SARS-COV-2- CYCLE NUMBER: NORMAL

## 2021-11-22 PROCEDURE — 25000003 PHARM REV CODE 250: Performed by: STUDENT IN AN ORGANIZED HEALTH CARE EDUCATION/TRAINING PROGRAM

## 2021-11-22 PROCEDURE — 36415 COLL VENOUS BLD VENIPUNCTURE: CPT | Performed by: STUDENT IN AN ORGANIZED HEALTH CARE EDUCATION/TRAINING PROGRAM

## 2021-11-22 PROCEDURE — 63600175 PHARM REV CODE 636 W HCPCS: Performed by: STUDENT IN AN ORGANIZED HEALTH CARE EDUCATION/TRAINING PROGRAM

## 2021-11-22 PROCEDURE — U0005 INFEC AGEN DETEC AMPLI PROBE: HCPCS | Performed by: STUDENT IN AN ORGANIZED HEALTH CARE EDUCATION/TRAINING PROGRAM

## 2021-11-22 PROCEDURE — 87070 CULTURE OTHR SPECIMN AEROBIC: CPT | Performed by: PHYSICIAN ASSISTANT

## 2021-11-22 PROCEDURE — 85610 PROTHROMBIN TIME: CPT | Performed by: STUDENT IN AN ORGANIZED HEALTH CARE EDUCATION/TRAINING PROGRAM

## 2021-11-22 PROCEDURE — 99222 1ST HOSP IP/OBS MODERATE 55: CPT | Mod: ,,, | Performed by: PSYCHIATRY & NEUROLOGY

## 2021-11-22 PROCEDURE — 85730 THROMBOPLASTIN TIME PARTIAL: CPT | Performed by: STUDENT IN AN ORGANIZED HEALTH CARE EDUCATION/TRAINING PROGRAM

## 2021-11-22 PROCEDURE — 99024 PR POST-OP FOLLOW-UP VISIT: ICD-10-PCS | Mod: ,,, | Performed by: PHYSICIAN ASSISTANT

## 2021-11-22 PROCEDURE — 86900 BLOOD TYPING SEROLOGIC ABO: CPT | Performed by: STUDENT IN AN ORGANIZED HEALTH CARE EDUCATION/TRAINING PROGRAM

## 2021-11-22 PROCEDURE — 99222 PR INITIAL HOSPITAL CARE,LEVL II: ICD-10-PCS | Mod: ,,, | Performed by: PSYCHIATRY & NEUROLOGY

## 2021-11-22 PROCEDURE — U0003 INFECTIOUS AGENT DETECTION BY NUCLEIC ACID (DNA OR RNA); SEVERE ACUTE RESPIRATORY SYNDROME CORONAVIRUS 2 (SARS-COV-2) (CORONAVIRUS DISEASE [COVID-19]), AMPLIFIED PROBE TECHNIQUE, MAKING USE OF HIGH THROUGHPUT TECHNOLOGIES AS DESCRIBED BY CMS-2020-01-R: HCPCS | Performed by: STUDENT IN AN ORGANIZED HEALTH CARE EDUCATION/TRAINING PROGRAM

## 2021-11-22 PROCEDURE — 99024 POSTOP FOLLOW-UP VISIT: CPT | Mod: ,,, | Performed by: PHYSICIAN ASSISTANT

## 2021-11-22 PROCEDURE — 94760 N-INVAS EAR/PLS OXIMETRY 1: CPT

## 2021-11-22 PROCEDURE — 11000001 HC ACUTE MED/SURG PRIVATE ROOM

## 2021-11-22 PROCEDURE — 97535 SELF CARE MNGMENT TRAINING: CPT

## 2021-11-22 PROCEDURE — 87205 SMEAR GRAM STAIN: CPT | Performed by: PHYSICIAN ASSISTANT

## 2021-11-22 PROCEDURE — 25000003 PHARM REV CODE 250: Performed by: PHYSICIAN ASSISTANT

## 2021-11-22 RX ORDER — POLYETHYLENE GLYCOL 3350 17 G/17G
17 POWDER, FOR SOLUTION ORAL DAILY
Status: DISCONTINUED | OUTPATIENT
Start: 2021-11-22 | End: 2021-11-24 | Stop reason: HOSPADM

## 2021-11-22 RX ORDER — AMOXICILLIN 250 MG
2 CAPSULE ORAL 2 TIMES DAILY
Status: DISCONTINUED | OUTPATIENT
Start: 2021-11-22 | End: 2021-11-24 | Stop reason: HOSPADM

## 2021-11-22 RX ADMIN — Medication 4000 UNITS: at 08:11

## 2021-11-22 RX ADMIN — SENNOSIDES AND DOCUSATE SODIUM 2 TABLET: 50; 8.6 TABLET ORAL at 02:11

## 2021-11-22 RX ADMIN — LEVETIRACETAM 1000 MG: 500 INJECTION, SOLUTION INTRAVENOUS at 08:11

## 2021-11-22 RX ADMIN — HYDROCODONE BITARTRATE AND ACETAMINOPHEN 1 TABLET: 5; 325 TABLET ORAL at 08:11

## 2021-11-22 RX ADMIN — SENNOSIDES AND DOCUSATE SODIUM 2 TABLET: 50; 8.6 TABLET ORAL at 09:11

## 2021-11-22 RX ADMIN — HEPARIN SODIUM 5000 UNITS: 5000 INJECTION INTRAVENOUS; SUBCUTANEOUS at 02:11

## 2021-11-22 RX ADMIN — LISINOPRIL 10 MG: 5 TABLET ORAL at 08:11

## 2021-11-22 RX ADMIN — OXCARBAZEPINE 600 MG: 300 SUSPENSION ORAL at 08:11

## 2021-11-22 RX ADMIN — ONDANSETRON 4 MG: 2 INJECTION INTRAMUSCULAR; INTRAVENOUS at 04:11

## 2021-11-22 RX ADMIN — SODIUM CHLORIDE: 0.9 INJECTION, SOLUTION INTRAVENOUS at 04:11

## 2021-11-22 RX ADMIN — FOLIC ACID 1 MG: 1 TABLET ORAL at 08:11

## 2021-11-22 RX ADMIN — PERAMPANEL 8 MG: 2 TABLET ORAL at 09:11

## 2021-11-22 RX ADMIN — PROMETHAZINE HYDROCHLORIDE 25 MG: 25 INJECTION INTRAMUSCULAR; INTRAVENOUS at 09:11

## 2021-11-22 RX ADMIN — OXCARBAZEPINE 600 MG: 300 SUSPENSION ORAL at 09:11

## 2021-11-22 RX ADMIN — POLYETHYLENE GLYCOL 3350 17 G: 17 POWDER, FOR SOLUTION ORAL at 02:11

## 2021-11-23 ENCOUNTER — ANESTHESIA (OUTPATIENT)
Dept: SURGERY | Facility: HOSPITAL | Age: 21
DRG: 032 | End: 2021-11-23
Payer: MEDICAID

## 2021-11-23 LAB
CLARITY CSF: CLEAR
COLOR CSF: COLORLESS
GLUCOSE CSF-MCNC: 64 MG/DL (ref 40–70)
LYMPHOCYTES NFR CSF MANUAL: 40 % (ref 40–80)
MONOS+MACROS NFR CSF MANUAL: 40 % (ref 15–45)
NEUTROPHILS NFR CSF MANUAL: 20 % (ref 0–6)
PROT CSF-MCNC: 10 MG/DL (ref 15–40)
RBC # CSF: 2 /CU MM
SPECIMEN VOL CSF: 3.5 ML
WBC # CSF: 1 /CU MM (ref 0–5)

## 2021-11-23 PROCEDURE — 63600175 PHARM REV CODE 636 W HCPCS: Performed by: STUDENT IN AN ORGANIZED HEALTH CARE EDUCATION/TRAINING PROGRAM

## 2021-11-23 PROCEDURE — 11000001 HC ACUTE MED/SURG PRIVATE ROOM

## 2021-11-23 PROCEDURE — 84157 ASSAY OF PROTEIN OTHER: CPT | Performed by: STUDENT IN AN ORGANIZED HEALTH CARE EDUCATION/TRAINING PROGRAM

## 2021-11-23 PROCEDURE — D9220A PRA ANESTHESIA: Mod: ANES,,, | Performed by: ANESTHESIOLOGY

## 2021-11-23 PROCEDURE — 62225 REPLACE/IRRIGATE CATHETER: CPT | Mod: 58,51,, | Performed by: STUDENT IN AN ORGANIZED HEALTH CARE EDUCATION/TRAINING PROGRAM

## 2021-11-23 PROCEDURE — 25000003 PHARM REV CODE 250: Performed by: PHYSICIAN ASSISTANT

## 2021-11-23 PROCEDURE — 89051 BODY FLUID CELL COUNT: CPT | Performed by: STUDENT IN AN ORGANIZED HEALTH CARE EDUCATION/TRAINING PROGRAM

## 2021-11-23 PROCEDURE — 36000710: Performed by: STUDENT IN AN ORGANIZED HEALTH CARE EDUCATION/TRAINING PROGRAM

## 2021-11-23 PROCEDURE — 25000003 PHARM REV CODE 250: Performed by: STUDENT IN AN ORGANIZED HEALTH CARE EDUCATION/TRAINING PROGRAM

## 2021-11-23 PROCEDURE — D9220A PRA ANESTHESIA: Mod: CRNA,,, | Performed by: NURSE ANESTHETIST, CERTIFIED REGISTERED

## 2021-11-23 PROCEDURE — D9220A PRA ANESTHESIA: ICD-10-PCS | Mod: CRNA,,, | Performed by: NURSE ANESTHETIST, CERTIFIED REGISTERED

## 2021-11-23 PROCEDURE — 99900035 HC TECH TIME PER 15 MIN (STAT)

## 2021-11-23 PROCEDURE — 71000016 HC POSTOP RECOV ADDL HR: Performed by: STUDENT IN AN ORGANIZED HEALTH CARE EDUCATION/TRAINING PROGRAM

## 2021-11-23 PROCEDURE — 37000009 HC ANESTHESIA EA ADD 15 MINS: Performed by: STUDENT IN AN ORGANIZED HEALTH CARE EDUCATION/TRAINING PROGRAM

## 2021-11-23 PROCEDURE — 62230 REPLACE/REVISE BRAIN SHUNT: CPT | Mod: 58,,, | Performed by: STUDENT IN AN ORGANIZED HEALTH CARE EDUCATION/TRAINING PROGRAM

## 2021-11-23 PROCEDURE — 62225 PR REPLACE/IRRIGATE VENTRIC CATH: ICD-10-PCS | Mod: 58,51,, | Performed by: STUDENT IN AN ORGANIZED HEALTH CARE EDUCATION/TRAINING PROGRAM

## 2021-11-23 PROCEDURE — 27201423 OPTIME MED/SURG SUP & DEVICES STERILE SUPPLY: Performed by: STUDENT IN AN ORGANIZED HEALTH CARE EDUCATION/TRAINING PROGRAM

## 2021-11-23 PROCEDURE — 63600175 PHARM REV CODE 636 W HCPCS: Performed by: NURSE ANESTHETIST, CERTIFIED REGISTERED

## 2021-11-23 PROCEDURE — 36000711: Performed by: STUDENT IN AN ORGANIZED HEALTH CARE EDUCATION/TRAINING PROGRAM

## 2021-11-23 PROCEDURE — 62230 PR REPLACEMENT/REVISION,CSF SHUNT: ICD-10-PCS | Mod: 58,,, | Performed by: STUDENT IN AN ORGANIZED HEALTH CARE EDUCATION/TRAINING PROGRAM

## 2021-11-23 PROCEDURE — 82945 GLUCOSE OTHER FLUID: CPT | Performed by: STUDENT IN AN ORGANIZED HEALTH CARE EDUCATION/TRAINING PROGRAM

## 2021-11-23 PROCEDURE — 71000033 HC RECOVERY, INTIAL HOUR: Performed by: STUDENT IN AN ORGANIZED HEALTH CARE EDUCATION/TRAINING PROGRAM

## 2021-11-23 PROCEDURE — 37000008 HC ANESTHESIA 1ST 15 MINUTES: Performed by: STUDENT IN AN ORGANIZED HEALTH CARE EDUCATION/TRAINING PROGRAM

## 2021-11-23 PROCEDURE — 87205 SMEAR GRAM STAIN: CPT | Performed by: STUDENT IN AN ORGANIZED HEALTH CARE EDUCATION/TRAINING PROGRAM

## 2021-11-23 PROCEDURE — C1729 CATH, DRAINAGE: HCPCS | Performed by: STUDENT IN AN ORGANIZED HEALTH CARE EDUCATION/TRAINING PROGRAM

## 2021-11-23 PROCEDURE — D9220A PRA ANESTHESIA: ICD-10-PCS | Mod: ANES,,, | Performed by: ANESTHESIOLOGY

## 2021-11-23 PROCEDURE — 71000015 HC POSTOP RECOV 1ST HR: Performed by: STUDENT IN AN ORGANIZED HEALTH CARE EDUCATION/TRAINING PROGRAM

## 2021-11-23 PROCEDURE — 87070 CULTURE OTHR SPECIMN AEROBIC: CPT | Performed by: STUDENT IN AN ORGANIZED HEALTH CARE EDUCATION/TRAINING PROGRAM

## 2021-11-23 PROCEDURE — 94761 N-INVAS EAR/PLS OXIMETRY MLT: CPT

## 2021-11-23 PROCEDURE — 25000003 PHARM REV CODE 250: Performed by: NURSE ANESTHETIST, CERTIFIED REGISTERED

## 2021-11-23 DEVICE — RESERVOIR BURR HOLE UNITIZED: Type: IMPLANTABLE DEVICE | Site: CRANIAL | Status: FUNCTIONAL

## 2021-11-23 RX ORDER — LIDOCAINE HYDROCHLORIDE 20 MG/ML
INJECTION INTRAVENOUS
Status: DISCONTINUED | OUTPATIENT
Start: 2021-11-23 | End: 2021-11-23

## 2021-11-23 RX ORDER — OXCARBAZEPINE 300 MG/1
600 TABLET, FILM COATED ORAL 2 TIMES DAILY
Status: DISCONTINUED | OUTPATIENT
Start: 2021-11-23 | End: 2021-11-24 | Stop reason: HOSPADM

## 2021-11-23 RX ORDER — ACETAMINOPHEN 10 MG/ML
INJECTION, SOLUTION INTRAVENOUS
Status: DISCONTINUED | OUTPATIENT
Start: 2021-11-23 | End: 2021-11-23

## 2021-11-23 RX ORDER — DEXMEDETOMIDINE HYDROCHLORIDE 100 UG/ML
INJECTION, SOLUTION INTRAVENOUS
Status: DISCONTINUED | OUTPATIENT
Start: 2021-11-23 | End: 2021-11-23

## 2021-11-23 RX ORDER — DEXAMETHASONE SODIUM PHOSPHATE 4 MG/ML
INJECTION, SOLUTION INTRA-ARTICULAR; INTRALESIONAL; INTRAMUSCULAR; INTRAVENOUS; SOFT TISSUE
Status: DISCONTINUED | OUTPATIENT
Start: 2021-11-23 | End: 2021-11-23

## 2021-11-23 RX ORDER — ROCURONIUM BROMIDE 10 MG/ML
INJECTION, SOLUTION INTRAVENOUS
Status: DISCONTINUED | OUTPATIENT
Start: 2021-11-23 | End: 2021-11-23

## 2021-11-23 RX ORDER — FAMOTIDINE 10 MG/ML
INJECTION INTRAVENOUS
Status: DISCONTINUED | OUTPATIENT
Start: 2021-11-23 | End: 2021-11-23

## 2021-11-23 RX ORDER — DIPHENHYDRAMINE HYDROCHLORIDE 50 MG/ML
INJECTION INTRAMUSCULAR; INTRAVENOUS
Status: DISCONTINUED | OUTPATIENT
Start: 2021-11-23 | End: 2021-11-23

## 2021-11-23 RX ORDER — MIDAZOLAM HYDROCHLORIDE 1 MG/ML
INJECTION, SOLUTION INTRAMUSCULAR; INTRAVENOUS
Status: DISCONTINUED | OUTPATIENT
Start: 2021-11-23 | End: 2021-11-23

## 2021-11-23 RX ORDER — FENTANYL CITRATE 50 UG/ML
INJECTION, SOLUTION INTRAMUSCULAR; INTRAVENOUS
Status: DISCONTINUED | OUTPATIENT
Start: 2021-11-23 | End: 2021-11-23

## 2021-11-23 RX ORDER — NEOSTIGMINE METHYLSULFATE 0.5 MG/ML
INJECTION, SOLUTION INTRAVENOUS
Status: DISCONTINUED | OUTPATIENT
Start: 2021-11-23 | End: 2021-11-23

## 2021-11-23 RX ORDER — FENTANYL CITRATE 50 UG/ML
25 INJECTION, SOLUTION INTRAMUSCULAR; INTRAVENOUS EVERY 5 MIN PRN
Status: DISCONTINUED | OUTPATIENT
Start: 2021-11-23 | End: 2021-11-23 | Stop reason: HOSPADM

## 2021-11-23 RX ORDER — CEFAZOLIN SODIUM 1 G/3ML
INJECTION, POWDER, FOR SOLUTION INTRAMUSCULAR; INTRAVENOUS
Status: DISCONTINUED | OUTPATIENT
Start: 2021-11-23 | End: 2021-11-23

## 2021-11-23 RX ORDER — PHENYLEPHRINE HYDROCHLORIDE 10 MG/ML
INJECTION INTRAVENOUS
Status: DISCONTINUED | OUTPATIENT
Start: 2021-11-23 | End: 2021-11-23

## 2021-11-23 RX ORDER — HYDROMORPHONE HYDROCHLORIDE 1 MG/ML
0.2 INJECTION, SOLUTION INTRAMUSCULAR; INTRAVENOUS; SUBCUTANEOUS EVERY 5 MIN PRN
Status: DISCONTINUED | OUTPATIENT
Start: 2021-11-23 | End: 2021-11-23 | Stop reason: HOSPADM

## 2021-11-23 RX ORDER — ONDANSETRON 2 MG/ML
INJECTION INTRAMUSCULAR; INTRAVENOUS
Status: DISCONTINUED | OUTPATIENT
Start: 2021-11-23 | End: 2021-11-23

## 2021-11-23 RX ORDER — CEFAZOLIN SODIUM 1 G/3ML
2 INJECTION, POWDER, FOR SOLUTION INTRAMUSCULAR; INTRAVENOUS
Status: COMPLETED | OUTPATIENT
Start: 2021-11-23 | End: 2021-11-23

## 2021-11-23 RX ORDER — DIPHENHYDRAMINE HYDROCHLORIDE 50 MG/ML
25 INJECTION INTRAMUSCULAR; INTRAVENOUS EVERY 6 HOURS PRN
Status: DISCONTINUED | OUTPATIENT
Start: 2021-11-23 | End: 2021-11-23 | Stop reason: HOSPADM

## 2021-11-23 RX ORDER — PROPOFOL 10 MG/ML
VIAL (ML) INTRAVENOUS
Status: DISCONTINUED | OUTPATIENT
Start: 2021-11-23 | End: 2021-11-23

## 2021-11-23 RX ORDER — ONDANSETRON 2 MG/ML
4 INJECTION INTRAMUSCULAR; INTRAVENOUS ONCE AS NEEDED
Status: DISCONTINUED | OUTPATIENT
Start: 2021-11-23 | End: 2021-11-23 | Stop reason: HOSPADM

## 2021-11-23 RX ADMIN — DEXMEDETOMIDINE HYDROCHLORIDE 4 MCG: 100 INJECTION, SOLUTION INTRAVENOUS at 09:11

## 2021-11-23 RX ADMIN — PHENYLEPHRINE HYDROCHLORIDE 50 MCG: 10 INJECTION INTRAVENOUS at 08:11

## 2021-11-23 RX ADMIN — MIDAZOLAM HYDROCHLORIDE 2 MG: 1 INJECTION, SOLUTION INTRAMUSCULAR; INTRAVENOUS at 07:11

## 2021-11-23 RX ADMIN — Medication 4000 UNITS: at 12:11

## 2021-11-23 RX ADMIN — SENNOSIDES AND DOCUSATE SODIUM 2 TABLET: 50; 8.6 TABLET ORAL at 09:11

## 2021-11-23 RX ADMIN — OXCARBAZEPINE 600 MG: 300 SUSPENSION ORAL at 12:11

## 2021-11-23 RX ADMIN — FOLIC ACID 1 MG: 1 TABLET ORAL at 12:11

## 2021-11-23 RX ADMIN — FAMOTIDINE 20 MG: 10 INJECTION, SOLUTION INTRAVENOUS at 07:11

## 2021-11-23 RX ADMIN — VANCOMYCIN HYDROCHLORIDE 10 MG: 500 INJECTION, POWDER, LYOPHILIZED, FOR SOLUTION INTRAVENOUS at 08:11

## 2021-11-23 RX ADMIN — OXCARBAZEPINE 600 MG: 300 TABLET, FILM COATED ORAL at 09:11

## 2021-11-23 RX ADMIN — LISINOPRIL 10 MG: 5 TABLET ORAL at 12:11

## 2021-11-23 RX ADMIN — CEFAZOLIN 2 G: 330 INJECTION, POWDER, FOR SOLUTION INTRAMUSCULAR; INTRAVENOUS at 08:11

## 2021-11-23 RX ADMIN — PERAMPANEL 8 MG: 2 TABLET ORAL at 09:11

## 2021-11-23 RX ADMIN — PHENYLEPHRINE HYDROCHLORIDE 50 MCG: 10 INJECTION INTRAVENOUS at 09:11

## 2021-11-23 RX ADMIN — ROCURONIUM BROMIDE 10 MG: 10 INJECTION INTRAVENOUS at 08:11

## 2021-11-23 RX ADMIN — PROPOFOL 100 MG: 10 INJECTION, EMULSION INTRAVENOUS at 07:11

## 2021-11-23 RX ADMIN — LIDOCAINE HYDROCHLORIDE 40 MG: 20 INJECTION, SOLUTION INTRAVENOUS at 07:11

## 2021-11-23 RX ADMIN — NEOSTIGMINE METHYLSULFATE 3 MG: 0.5 INJECTION INTRAVENOUS at 09:11

## 2021-11-23 RX ADMIN — CEFAZOLIN 2 G: 330 INJECTION, POWDER, FOR SOLUTION INTRAMUSCULAR; INTRAVENOUS at 09:11

## 2021-11-23 RX ADMIN — SENNOSIDES AND DOCUSATE SODIUM 2 TABLET: 50; 8.6 TABLET ORAL at 12:11

## 2021-11-23 RX ADMIN — DIPHENHYDRAMINE HYDROCHLORIDE 6.25 MG: 50 INJECTION INTRAMUSCULAR; INTRAVENOUS at 07:11

## 2021-11-23 RX ADMIN — ONDANSETRON 4 MG: 2 INJECTION INTRAMUSCULAR; INTRAVENOUS at 07:11

## 2021-11-23 RX ADMIN — ROCURONIUM BROMIDE 50 MG: 10 INJECTION INTRAVENOUS at 07:11

## 2021-11-23 RX ADMIN — LEVETIRACETAM 1000 MG: 500 INJECTION, SOLUTION INTRAVENOUS at 12:11

## 2021-11-23 RX ADMIN — FENTANYL CITRATE 100 MCG: 50 INJECTION, SOLUTION INTRAMUSCULAR; INTRAVENOUS at 07:11

## 2021-11-23 RX ADMIN — GENTAMICIN 4 MG: 10 INJECTION, SOLUTION INTRAMUSCULAR; INTRAVENOUS at 08:11

## 2021-11-23 RX ADMIN — LEVETIRACETAM 1000 MG: 500 INJECTION, SOLUTION INTRAVENOUS at 09:11

## 2021-11-23 RX ADMIN — CEFAZOLIN 2 G: 330 INJECTION, POWDER, FOR SOLUTION INTRAMUSCULAR; INTRAVENOUS at 02:11

## 2021-11-23 RX ADMIN — DEXAMETHASONE SODIUM PHOSPHATE 8 MG: 4 INJECTION, SOLUTION INTRAMUSCULAR; INTRAVENOUS at 07:11

## 2021-11-23 RX ADMIN — ACETAMINOPHEN 800 MG: 10 INJECTION INTRAVENOUS at 09:11

## 2021-11-23 RX ADMIN — FENTANYL CITRATE 25 MCG: 50 INJECTION, SOLUTION INTRAMUSCULAR; INTRAVENOUS at 09:11

## 2021-11-23 RX ADMIN — SODIUM CHLORIDE: 0.9 INJECTION, SOLUTION INTRAVENOUS at 07:11

## 2021-11-23 RX ADMIN — GLYCOPYRROLATE 0.4 MG: 0.2 INJECTION, SOLUTION INTRAMUSCULAR; INTRAVITREAL at 09:11

## 2021-11-24 VITALS
SYSTOLIC BLOOD PRESSURE: 155 MMHG | WEIGHT: 119.06 LBS | DIASTOLIC BLOOD PRESSURE: 82 MMHG | TEMPERATURE: 99 F | HEIGHT: 59 IN | RESPIRATION RATE: 14 BRPM | HEART RATE: 110 BPM | OXYGEN SATURATION: 100 % | BODY MASS INDEX: 24 KG/M2

## 2021-11-24 LAB
ANION GAP SERPL CALC-SCNC: 9 MMOL/L (ref 8–16)
ANISOCYTOSIS BLD QL SMEAR: SLIGHT
BASOPHILS # BLD AUTO: 0.04 K/UL (ref 0–0.2)
BASOPHILS NFR BLD: 0.4 % (ref 0–1.9)
BUN SERPL-MCNC: 11 MG/DL (ref 6–20)
CALCIUM SERPL-MCNC: 9.6 MG/DL (ref 8.7–10.5)
CHLORIDE SERPL-SCNC: 103 MMOL/L (ref 95–110)
CO2 SERPL-SCNC: 25 MMOL/L (ref 23–29)
CREAT SERPL-MCNC: 0.8 MG/DL (ref 0.5–1.4)
DACRYOCYTES BLD QL SMEAR: ABNORMAL
DIFFERENTIAL METHOD: ABNORMAL
EOSINOPHIL # BLD AUTO: 0 K/UL (ref 0–0.5)
EOSINOPHIL NFR BLD: 0.2 % (ref 0–8)
ERYTHROCYTE [DISTWIDTH] IN BLOOD BY AUTOMATED COUNT: 18.1 % (ref 11.5–14.5)
EST. GFR  (AFRICAN AMERICAN): >60 ML/MIN/1.73 M^2
EST. GFR  (NON AFRICAN AMERICAN): >60 ML/MIN/1.73 M^2
GLUCOSE SERPL-MCNC: 89 MG/DL (ref 70–110)
HCT VFR BLD AUTO: 32.9 % (ref 37–48.5)
HGB BLD-MCNC: 10.6 G/DL (ref 12–16)
HYPOCHROMIA BLD QL SMEAR: ABNORMAL
IMM GRANULOCYTES # BLD AUTO: 0.05 K/UL (ref 0–0.04)
IMM GRANULOCYTES NFR BLD AUTO: 0.5 % (ref 0–0.5)
LYMPHOCYTES # BLD AUTO: 2.4 K/UL (ref 1–4.8)
LYMPHOCYTES NFR BLD: 23.7 % (ref 18–48)
MCH RBC QN AUTO: 21.9 PG (ref 27–31)
MCHC RBC AUTO-ENTMCNC: 32.2 G/DL (ref 32–36)
MCV RBC AUTO: 68 FL (ref 82–98)
MONOCYTES # BLD AUTO: 1.1 K/UL (ref 0.3–1)
MONOCYTES NFR BLD: 10.7 % (ref 4–15)
NEUTROPHILS # BLD AUTO: 6.5 K/UL (ref 1.8–7.7)
NEUTROPHILS NFR BLD: 64.5 % (ref 38–73)
NRBC BLD-RTO: 0 /100 WBC
OVALOCYTES BLD QL SMEAR: ABNORMAL
PLATELET # BLD AUTO: 325 K/UL (ref 150–450)
PLATELET BLD QL SMEAR: ABNORMAL
PMV BLD AUTO: 10.7 FL (ref 9.2–12.9)
POIKILOCYTOSIS BLD QL SMEAR: SLIGHT
POLYCHROMASIA BLD QL SMEAR: ABNORMAL
POTASSIUM SERPL-SCNC: 3.5 MMOL/L (ref 3.5–5.1)
RBC # BLD AUTO: 4.85 M/UL (ref 4–5.4)
SODIUM SERPL-SCNC: 137 MMOL/L (ref 136–145)
TARGETS BLD QL SMEAR: ABNORMAL
WBC # BLD AUTO: 10.13 K/UL (ref 3.9–12.7)

## 2021-11-24 PROCEDURE — 36415 COLL VENOUS BLD VENIPUNCTURE: CPT | Performed by: PHYSICIAN ASSISTANT

## 2021-11-24 PROCEDURE — 97164 PT RE-EVAL EST PLAN CARE: CPT

## 2021-11-24 PROCEDURE — 63600175 PHARM REV CODE 636 W HCPCS: Performed by: STUDENT IN AN ORGANIZED HEALTH CARE EDUCATION/TRAINING PROGRAM

## 2021-11-24 PROCEDURE — 97112 NEUROMUSCULAR REEDUCATION: CPT

## 2021-11-24 PROCEDURE — 99024 PR POST-OP FOLLOW-UP VISIT: ICD-10-PCS | Mod: ,,, | Performed by: PHYSICIAN ASSISTANT

## 2021-11-24 PROCEDURE — 85025 COMPLETE CBC W/AUTO DIFF WBC: CPT | Performed by: PHYSICIAN ASSISTANT

## 2021-11-24 PROCEDURE — 93010 EKG 12-LEAD: ICD-10-PCS | Mod: ,,, | Performed by: INTERNAL MEDICINE

## 2021-11-24 PROCEDURE — 97535 SELF CARE MNGMENT TRAINING: CPT

## 2021-11-24 PROCEDURE — 93005 ELECTROCARDIOGRAM TRACING: CPT

## 2021-11-24 PROCEDURE — 25000003 PHARM REV CODE 250: Performed by: STUDENT IN AN ORGANIZED HEALTH CARE EDUCATION/TRAINING PROGRAM

## 2021-11-24 PROCEDURE — 93010 ELECTROCARDIOGRAM REPORT: CPT | Mod: ,,, | Performed by: INTERNAL MEDICINE

## 2021-11-24 PROCEDURE — 94760 N-INVAS EAR/PLS OXIMETRY 1: CPT

## 2021-11-24 PROCEDURE — 94761 N-INVAS EAR/PLS OXIMETRY MLT: CPT

## 2021-11-24 PROCEDURE — 80048 BASIC METABOLIC PNL TOTAL CA: CPT | Performed by: PHYSICIAN ASSISTANT

## 2021-11-24 PROCEDURE — 99024 POSTOP FOLLOW-UP VISIT: CPT | Mod: ,,, | Performed by: PHYSICIAN ASSISTANT

## 2021-11-24 RX ORDER — CEPHALEXIN 500 MG/1
500 CAPSULE ORAL EVERY 6 HOURS
Qty: 28 CAPSULE | Refills: 0 | Status: SHIPPED | OUTPATIENT
Start: 2021-11-24 | End: 2021-12-01

## 2021-11-24 RX ADMIN — OXCARBAZEPINE 600 MG: 300 TABLET, FILM COATED ORAL at 08:11

## 2021-11-24 RX ADMIN — LEVETIRACETAM 1000 MG: 500 INJECTION, SOLUTION INTRAVENOUS at 08:11

## 2021-11-24 RX ADMIN — LISINOPRIL 10 MG: 5 TABLET ORAL at 08:11

## 2021-11-24 RX ADMIN — Medication 4000 UNITS: at 08:11

## 2021-11-24 RX ADMIN — FOLIC ACID 1 MG: 1 TABLET ORAL at 08:11

## 2021-11-27 LAB
BACTERIA CSF CULT: NO GROWTH
GRAM STN SPEC: NORMAL
GRAM STN SPEC: NORMAL

## 2021-11-28 LAB
BACTERIA CSF CULT: NO GROWTH
GRAM STN SPEC: NORMAL
GRAM STN SPEC: NORMAL

## 2021-11-29 ENCOUNTER — CLINICAL SUPPORT (OUTPATIENT)
Dept: REHABILITATION | Facility: HOSPITAL | Age: 21
End: 2021-11-29
Payer: MEDICAID

## 2021-11-29 DIAGNOSIS — R68.89 DECREASED STRENGTH, ENDURANCE, AND MOBILITY: ICD-10-CM

## 2021-11-29 DIAGNOSIS — R26.89 BALANCE PROBLEMS: ICD-10-CM

## 2021-11-29 DIAGNOSIS — Z98.2 S/P VP SHUNT: ICD-10-CM

## 2021-11-29 DIAGNOSIS — Z74.09 DECREASED STRENGTH, ENDURANCE, AND MOBILITY: ICD-10-CM

## 2021-11-29 DIAGNOSIS — R26.9 GAIT ABNORMALITY: ICD-10-CM

## 2021-11-29 DIAGNOSIS — R53.1 DECREASED STRENGTH, ENDURANCE, AND MOBILITY: ICD-10-CM

## 2021-11-29 PROCEDURE — 97161 PT EVAL LOW COMPLEX 20 MIN: CPT | Mod: PN

## 2021-11-30 PROBLEM — I10 HYPERTENSION, ESSENTIAL, BENIGN: Status: ACTIVE | Noted: 2021-11-30

## 2021-12-06 ENCOUNTER — PATIENT MESSAGE (OUTPATIENT)
Dept: NEUROSURGERY | Facility: CLINIC | Age: 21
End: 2021-12-06
Payer: MEDICAID

## 2021-12-07 ENCOUNTER — CLINICAL SUPPORT (OUTPATIENT)
Dept: REHABILITATION | Facility: HOSPITAL | Age: 21
End: 2021-12-07
Payer: MEDICAID

## 2021-12-07 DIAGNOSIS — Z98.2 S/P VP SHUNT: ICD-10-CM

## 2021-12-07 DIAGNOSIS — R29.898 LEFT HAND WEAKNESS: ICD-10-CM

## 2021-12-07 PROCEDURE — 97165 OT EVAL LOW COMPLEX 30 MIN: CPT | Mod: PN

## 2021-12-21 ENCOUNTER — IMMUNIZATION (OUTPATIENT)
Dept: OBSTETRICS AND GYNECOLOGY | Facility: CLINIC | Age: 21
End: 2021-12-21
Payer: MEDICAID

## 2021-12-21 DIAGNOSIS — Z23 NEED FOR VACCINATION: Primary | ICD-10-CM

## 2021-12-21 PROCEDURE — 91300 COVID-19, MRNA, LNP-S, PF, 30 MCG/0.3 ML DOSE VACCINE: CPT | Mod: PBBFAC

## 2021-12-21 PROCEDURE — 0002A COVID-19, MRNA, LNP-S, PF, 30 MCG/0.3 ML DOSE VACCINE: CPT | Mod: PBBFAC

## 2021-12-28 ENCOUNTER — CLINICAL SUPPORT (OUTPATIENT)
Dept: REHABILITATION | Facility: HOSPITAL | Age: 21
End: 2021-12-28
Payer: MEDICAID

## 2021-12-28 ENCOUNTER — OFFICE VISIT (OUTPATIENT)
Dept: NEUROSURGERY | Facility: CLINIC | Age: 21
End: 2021-12-28
Payer: MEDICAID

## 2021-12-28 VITALS
HEART RATE: 82 BPM | WEIGHT: 103 LBS | DIASTOLIC BLOOD PRESSURE: 68 MMHG | OXYGEN SATURATION: 98 % | HEIGHT: 61 IN | SYSTOLIC BLOOD PRESSURE: 116 MMHG | TEMPERATURE: 99 F | BODY MASS INDEX: 19.45 KG/M2

## 2021-12-28 DIAGNOSIS — R26.89 BALANCE PROBLEMS: ICD-10-CM

## 2021-12-28 DIAGNOSIS — G40.219 LOCALIZATION-RELATED (FOCAL) (PARTIAL) SYMPTOMATIC EPILEPSY AND EPILEPTIC SYNDROMES WITH COMPLEX PARTIAL SEIZURES, INTRACTABLE, WITHOUT STATUS EPILEPTICUS: ICD-10-CM

## 2021-12-28 DIAGNOSIS — Z74.09 DECREASED STRENGTH, ENDURANCE, AND MOBILITY: ICD-10-CM

## 2021-12-28 DIAGNOSIS — R53.1 DECREASED STRENGTH, ENDURANCE, AND MOBILITY: ICD-10-CM

## 2021-12-28 DIAGNOSIS — R26.9 GAIT ABNORMALITY: ICD-10-CM

## 2021-12-28 DIAGNOSIS — R68.89 DECREASED STRENGTH, ENDURANCE, AND MOBILITY: ICD-10-CM

## 2021-12-28 DIAGNOSIS — Z98.2 STATUS POST VENTRICULOPERITONEAL SHUNT: ICD-10-CM

## 2021-12-28 DIAGNOSIS — Z98.2 VP (VENTRICULOPERITONEAL) SHUNT STATUS: ICD-10-CM

## 2021-12-28 DIAGNOSIS — T85.618S SHUNT MALFUNCTION, SEQUELA: Primary | ICD-10-CM

## 2021-12-28 PROCEDURE — 3008F BODY MASS INDEX DOCD: CPT | Mod: CPTII,,, | Performed by: STUDENT IN AN ORGANIZED HEALTH CARE EDUCATION/TRAINING PROGRAM

## 2021-12-28 PROCEDURE — 1159F MED LIST DOCD IN RCRD: CPT | Mod: CPTII,,, | Performed by: STUDENT IN AN ORGANIZED HEALTH CARE EDUCATION/TRAINING PROGRAM

## 2021-12-28 PROCEDURE — 3078F DIAST BP <80 MM HG: CPT | Mod: CPTII,,, | Performed by: STUDENT IN AN ORGANIZED HEALTH CARE EDUCATION/TRAINING PROGRAM

## 2021-12-28 PROCEDURE — 3074F PR MOST RECENT SYSTOLIC BLOOD PRESSURE < 130 MM HG: ICD-10-PCS | Mod: CPTII,,, | Performed by: STUDENT IN AN ORGANIZED HEALTH CARE EDUCATION/TRAINING PROGRAM

## 2021-12-28 PROCEDURE — 99213 OFFICE O/P EST LOW 20 MIN: CPT | Mod: PBBFAC | Performed by: STUDENT IN AN ORGANIZED HEALTH CARE EDUCATION/TRAINING PROGRAM

## 2021-12-28 PROCEDURE — 99999 PR PBB SHADOW E&M-EST. PATIENT-LVL III: ICD-10-PCS | Mod: PBBFAC,,, | Performed by: STUDENT IN AN ORGANIZED HEALTH CARE EDUCATION/TRAINING PROGRAM

## 2021-12-28 PROCEDURE — 3008F PR BODY MASS INDEX (BMI) DOCUMENTED: ICD-10-PCS | Mod: CPTII,,, | Performed by: STUDENT IN AN ORGANIZED HEALTH CARE EDUCATION/TRAINING PROGRAM

## 2021-12-28 PROCEDURE — 4010F ACE/ARB THERAPY RXD/TAKEN: CPT | Mod: CPTII,,, | Performed by: STUDENT IN AN ORGANIZED HEALTH CARE EDUCATION/TRAINING PROGRAM

## 2021-12-28 PROCEDURE — 4010F PR ACE/ARB THEARPY RXD/TAKEN: ICD-10-PCS | Mod: CPTII,,, | Performed by: STUDENT IN AN ORGANIZED HEALTH CARE EDUCATION/TRAINING PROGRAM

## 2021-12-28 PROCEDURE — 99999 PR PBB SHADOW E&M-EST. PATIENT-LVL III: CPT | Mod: PBBFAC,,, | Performed by: STUDENT IN AN ORGANIZED HEALTH CARE EDUCATION/TRAINING PROGRAM

## 2021-12-28 PROCEDURE — 1160F PR REVIEW ALL MEDS BY PRESCRIBER/CLIN PHARMACIST DOCUMENTED: ICD-10-PCS | Mod: CPTII,,, | Performed by: STUDENT IN AN ORGANIZED HEALTH CARE EDUCATION/TRAINING PROGRAM

## 2021-12-28 PROCEDURE — 3078F PR MOST RECENT DIASTOLIC BLOOD PRESSURE < 80 MM HG: ICD-10-PCS | Mod: CPTII,,, | Performed by: STUDENT IN AN ORGANIZED HEALTH CARE EDUCATION/TRAINING PROGRAM

## 2021-12-28 PROCEDURE — 1159F PR MEDICATION LIST DOCUMENTED IN MEDICAL RECORD: ICD-10-PCS | Mod: CPTII,,, | Performed by: STUDENT IN AN ORGANIZED HEALTH CARE EDUCATION/TRAINING PROGRAM

## 2021-12-28 PROCEDURE — 99024 POSTOP FOLLOW-UP VISIT: CPT | Mod: ,,, | Performed by: STUDENT IN AN ORGANIZED HEALTH CARE EDUCATION/TRAINING PROGRAM

## 2021-12-28 PROCEDURE — 3074F SYST BP LT 130 MM HG: CPT | Mod: CPTII,,, | Performed by: STUDENT IN AN ORGANIZED HEALTH CARE EDUCATION/TRAINING PROGRAM

## 2021-12-28 PROCEDURE — 1160F RVW MEDS BY RX/DR IN RCRD: CPT | Mod: CPTII,,, | Performed by: STUDENT IN AN ORGANIZED HEALTH CARE EDUCATION/TRAINING PROGRAM

## 2021-12-28 PROCEDURE — 99024 PR POST-OP FOLLOW-UP VISIT: ICD-10-PCS | Mod: ,,, | Performed by: STUDENT IN AN ORGANIZED HEALTH CARE EDUCATION/TRAINING PROGRAM

## 2021-12-28 PROCEDURE — 97110 THERAPEUTIC EXERCISES: CPT | Mod: PN

## 2022-01-11 ENCOUNTER — CLINICAL SUPPORT (OUTPATIENT)
Dept: REHABILITATION | Facility: HOSPITAL | Age: 22
End: 2022-01-11
Payer: MEDICAID

## 2022-01-11 ENCOUNTER — PATIENT MESSAGE (OUTPATIENT)
Dept: NEUROSURGERY | Facility: CLINIC | Age: 22
End: 2022-01-11
Payer: MEDICAID

## 2022-01-11 DIAGNOSIS — R68.89 DECREASED STRENGTH, ENDURANCE, AND MOBILITY: ICD-10-CM

## 2022-01-11 DIAGNOSIS — R26.89 BALANCE PROBLEMS: ICD-10-CM

## 2022-01-11 DIAGNOSIS — R29.898 LEFT HAND WEAKNESS: ICD-10-CM

## 2022-01-11 DIAGNOSIS — Z74.09 DECREASED STRENGTH, ENDURANCE, AND MOBILITY: ICD-10-CM

## 2022-01-11 DIAGNOSIS — R53.1 DECREASED STRENGTH, ENDURANCE, AND MOBILITY: ICD-10-CM

## 2022-01-11 DIAGNOSIS — R26.9 GAIT ABNORMALITY: ICD-10-CM

## 2022-01-11 PROCEDURE — 97110 THERAPEUTIC EXERCISES: CPT | Mod: PN

## 2022-01-11 NOTE — PROGRESS NOTES
[facility]   Physical Therapy Treatment Note     Name: Stephany Cohn  Clinic Number: [id number]    Therapy Diagnosis:   Encounter Diagnoses   Name Primary?    Left hand weakness     Decreased strength, endurance, and mobility     Gait abnormality     Balance problems      Physician: Abbey Sandhu PA-C    Visit Date: 1/11/2022    Physician Orders: PT Eval and Treat   Medical Diagnosis from Referral: Z98.2 (ICD-10-CM) - S/P  shunt  Evaluation Date: 11/29/2021  Authorization Period Expiration: 2/28/2022  Plan of Care Expiration: 3/1/2022  Visit # / Visits authorized: 2/12 +eval                 PN Due: 12/29/21    Precautions: Standard and Fall and  Shunt    Time In: 2:30 PM  Time Out: 3:15 PM  Total Billable Time: 45 minutes      SUBJECTIVE     Pt reports: no falls to report, doing ok overall    She was compliant with home exercise program.  Response to previous treatment: none  Functional change: ongoing    Pain: 0/10  Location: none reported    TREATMENT     Stephany received the treatments listed below:      received therapeutic exercises to develop strength and ROM for 45minutes including:    Nu-Step x 6 minutes L1    Sit to stands from 18 in mat x 10   while standing on foam 2x10    Lateral walking in // bars with YTB x 3 laps  Retro walking in // bars x 3 laps    NBOS balance, firm 2x30  NBOS balance, firm, eyes closed 2x30  NBOS balance, foam, eyes open 2x30  NBOS balance, foam, eyes closed 2x30    L SLS on foam with opposite LE on cone 3x30 ea    Tandem walking, modified x 3 laps in // bars    Not performed today:  Bridges 2x10  L LE SL Bridges x 10  L LE SAQ 2x10  L LE clamshells 2x10  L LE SLR 2x10      PATIENT EDUCATION AND HOME EXERCISES     Home Exercises Provided and Patient Education Provided     Education provided:   - role of PT, plan of care, goals, scheduling limitations, cancellation policies    Written Home Exercises Provided: HEP to be fully provided next  session.     ASSESSMENT     Pt tolerated session well today overall with no adverse response noted. Pt with most difficulty this session performing tandem walking- modified. Pt requiring intermittent UE support during modified tandem walking to maintain balance. Pt requiring mod vc throughout lateral walking to avoid compensatory patterns. Pt with appropriate challenge noted to all balance activities today. Pt remains appropriate for therapy at this time. Plan to continue progression of LE strengthening and static/ dynamic balance next session as tolerated.     Stephany Is progressing well towards her goals.   Pt prognosis is Good.     Pt will continue to benefit from skilled outpatient physical therapy to address the deficits listed in the problem list box on initial evaluation, provide pt/family education and to maximize pt's level of independence in the home and community environment.     Pt's spiritual, cultural and educational needs considered and pt agreeable to plan of care and goals.     Anticipated barriers to physical therapy: chronicity of CP diagnoses    Short Term Goals- 4 Weeks  1. Pt to demonstrate independence in performance of HEP in order to improve daily level of physical activity and improve carry over session to session.  2. Pt to improve B LE strength by > / = 1/2 MMT score in order to improve strength for daily activities.  3. Pt to demonstrate TUG score of < / = 15 seconds in order to decrease fall risk and maximize functional strength for daily activities.  4. Pt to demonstrate 5 time sit to stand score of < / = 12 seconds in order to maximize functional independence and functional strength.  5. Pt to improve self selected walking speed to < / = 0.6 m/s in order to maximize safety and confidence in household and community mobility.   6. Pt to demonstrate ability to balance for > / = 15 seconds on all MCTSIB conditions with minimal sway in order to improve vestibular response and decrease fall  risk.   7. Pt to demonstrate ability to balance on B SLS for > / = 5 seconds in order to improve mechanics of gait and decrease fall risk.      Long Term Goals - 12 Weeks  1. Pt to demonstrate compliance with HEP > / = 3x per week in order to improve daily level of physical activity and improve carry over session to session.  2. Pt to improve B LE strength by > / = 1 MMT score in order to improve strength for daily activities.  3. Pt to demonstrate TUG score of < / = 12 seconds in order to decrease fall risk and maximize functional strength for daily activities.  4. Pt to demonstrate 5 time sit to stand score of < / = 10 seconds in order to maximize functional independence and functional strength.  5. Pt to improve self selected walking speed to > / = 0.64 m/s in order to maximize safety and confidence in household and community mobility.   6. Pt to demonstrate ability to balance for > / = 30 seconds on all MCTSIB conditions with no sway in order to improve vestibular response and decrease fall risk.   7. Pt to demonstrate ability to balance on B SLS for > / = 10 seconds in order to improve mechanics of gait and decrease fall risk.       PLAN     Plan of care Certification: 11/29/2021 to 2/29/2022.     Outpatient Physical Therapy 1-2 times weekly for 10 weeks to include the following interventions: Gait Training, Manual Therapy, Moist Heat/ Ice, Neuromuscular Re-ed, Patient Education, Self Care, Therapeutic Activites and Therapeutic Exercise.     Noreen Phillips, PT, DPT  1/11/2022

## 2022-01-18 ENCOUNTER — CLINICAL SUPPORT (OUTPATIENT)
Dept: REHABILITATION | Facility: HOSPITAL | Age: 22
End: 2022-01-18
Payer: MEDICAID

## 2022-01-18 DIAGNOSIS — R26.9 GAIT ABNORMALITY: ICD-10-CM

## 2022-01-18 DIAGNOSIS — R26.89 BALANCE PROBLEMS: ICD-10-CM

## 2022-01-18 DIAGNOSIS — R68.89 DECREASED STRENGTH, ENDURANCE, AND MOBILITY: ICD-10-CM

## 2022-01-18 DIAGNOSIS — Z74.09 DECREASED STRENGTH, ENDURANCE, AND MOBILITY: ICD-10-CM

## 2022-01-18 DIAGNOSIS — R53.1 DECREASED STRENGTH, ENDURANCE, AND MOBILITY: ICD-10-CM

## 2022-01-18 PROCEDURE — 97110 THERAPEUTIC EXERCISES: CPT | Mod: PN

## 2022-01-18 NOTE — PROGRESS NOTES
[facility]   Physical Therapy Treatment Note     Name: Stephany Cohn  Clinic Number: [id number]    Therapy Diagnosis:   No diagnosis found.  Physician: Abbey Sandhu PA-C    Visit Date: 1/18/2022    Physician Orders: PT Eval and Treat   Medical Diagnosis from Referral: Z98.2 (ICD-10-CM) - S/P  shunt  Evaluation Date: 11/29/2021  Authorization Period Expiration: 2/28/2022  Plan of Care Expiration: 3/1/2022  Visit # / Visits authorized: 3/12 +eval                 PN Due: 12/29/21    Precautions: Standard and Fall and  Shunt    Time In: 2:30 PM  Time Out: 3:15 PM  Total Billable Time: 45 minutes      SUBJECTIVE     Pt reports: Pt states she feels good. No falls to report. She did not feel sore from last therapy session.    She was compliant with home exercise program.  Response to previous treatment: none  Functional change: ongoing    Pain: 0/10  Location: none reported    OBJECTIVE     Lower Extremity Strength    RLE- 11/29/21 RLE- 1/18/22 LLE- 11/29/21 LLE- 1/18/22   Hip Flexion: 5/5 5 4+/5 5   Hip Extension:  5/5 4 4/5 4+   Hip Abduction: 4/5 4 4-/5 4   Knee Extension: 4+/5 5 4/5 5   Knee Flexion: 4/5 5 3+/5 5   Ankle Dorsiflexion: 5/5 5 2/5 2+   Ankle Plantarflexion: 5/5 NT NT 3   Ankle Inversion: 5/5 5 NT 3   Ankle Eversion: 5/5 5 NT 3      Abdominal Strength: (evaluation: 4/5)          Evaluation 1/18/22   Single Limb Stance R LE < 1 second  (<10 sec = HIGH FALL RISK) 2s   Single Limb Stance L LE < 1 second  (<10 sec = HIGH FALL RISK) <1s   5 times sit-stand 14 seconds  >12 sec= fall risk for general elderly  >16 sec= fall risk for Parkinson's disease  >10 sec= balance/vestibular dysfunction (<59 y/o)  >14.2 sec= balance/vestibular dysfunction (>59 y/o)  >12 sec= fall risk for CVA 28s no UE      Postural control:  MCTSIB:  1. Eyes Open/feet together/Firm: NT (eval: 30 seconds)  2. Eyes Closed/feet together/Firm: 30s, no sway (30 seconds, min sway)  3. Eyes Open/feet  together/Foam: 30s, min sway (30 seconds, min sway)  4. Eyes Closed/feet together/Foam: 30s min sway (3 seconds)     GAIT DEVIATIONS:  Gait component performance:   Decreased L LE WB time  Decreased R LE step length  Forefoot contact on L at initial contact  L knee hyperextension during stance  Increased knee/hip flexion during L LE swing phase  * Above impairments indicate decreased gait safety and increased fall risk.       Evaluation 1/18/22   Timed Up and Go 17 sec  < 20 sec safe for independent transfers,     < 30 sec assist required for transfers 8s   Self Selected Gait Speed 0.54 m/sec (6m/11s) 0.6 m/s (6m/10s)   Fast Walking Speed 0.6 m/sec (6m/10s) 1.0 m/s (6m/6s)          TREATMENT     Stephany received the treatments listed below:      received therapeutic exercises to develop strength and ROM for 45minutes including:    Nu-Step x 6 minutes L1    Sit to stands from 18 in mat x 10   while standing on foam 2x10    Lateral walking in // bars with YTB x 3 laps  Retro walking in // bars x 3 laps    NBOS balance, firm 2x30  NBOS balance, firm, eyes closed 2x30  NBOS balance, foam, eyes open 2x30  NBOS balance, foam, eyes closed 2x30    L SLS on foam with opposite LE on cone 3x30 ea    Tandem walking, modified x 3 laps in // bars    +Shuttle with RTB at knees, 2 bands resistance, 2x10    Not performed today:  Bridges 2x10  L LE SL Bridges x 10  L LE SAQ 2x10  L LE clamshells 2x10  L LE SLR 2x10      PATIENT EDUCATION AND HOME EXERCISES     Home Exercises Provided and Patient Education Provided     Education provided:   - role of PT, plan of care, goals, scheduling limitations, cancellation policies    Written Home Exercises Provided: HEP to be fully provided next session.     ASSESSMENT     Pt was re assessed today and displays significant improvements in standing balance and gait speed, with some deficits remaining in single leg balancing and LLE strength. Pt's 5x STS time doubled but she was able to fully  complete the task without any UE support, which was required at initial evaluation. Pt was noted to appear more confident in walking, especially at a fast pace. She has met 4/7 short term goals and 2/7 long term goals. All other goals were addressed and pt is continuing to progress apporpriately. Future therapy sessions will progress exercise to include more single leg challenges, including single leg shuttle presses and single leg cone taps. Pt will be scheduled 1x/week for the next month with a potential discharge if pt continues to make similar gains.    Stephany Is progressing well towards her goals.   Pt prognosis is Good.     Pt will continue to benefit from skilled outpatient physical therapy to address the deficits listed in the problem list box on initial evaluation, provide pt/family education and to maximize pt's level of independence in the home and community environment.     Pt's spiritual, cultural and educational needs considered and pt agreeable to plan of care and goals.     Anticipated barriers to physical therapy: chronicity of CP diagnoses    Short Term Goals- 4 Weeks  1. Pt to demonstrate independence in performance of HEP in order to improve daily level of physical activity and improve carry over session to session.- MET  2. Pt to improve B LE strength by > / = 1/2 MMT score in order to improve strength for daily activities. -ONGOING  3. Pt to demonstrate TUG score of < / = 15 seconds in order to decrease fall risk and maximize functional strength for daily activities. -MET  4. Pt to demonstrate 5 time sit to stand score of < / = 12 seconds in order to maximize functional independence and functional strength. - ONGOING  5. Pt to improve self selected walking speed to > / = 0.6 m/s in order to maximize safety and confidence in household and community mobility. MET  6. Pt to demonstrate ability to balance for > / = 15 seconds on all MCTSIB conditions with minimal sway in order to improve  vestibular response and decrease fall risk. MET  7. Pt to demonstrate ability to balance on B SLS for > / = 5 seconds in order to improve mechanics of gait and decrease fall risk. ONGOING     Long Term Goals - 12 Weeks  1. Pt to demonstrate compliance with HEP > / = 3x per week in order to improve daily level of physical activity and improve carry over session to session. ONGOING  2. Pt to improve B LE strength by > / = 1 MMT score in order to improve strength for daily activities. ONGOING  3. Pt to demonstrate TUG score of < / = 12 seconds in order to decrease fall risk and maximize functional strength for daily activities. MET  4. Pt to demonstrate 5 time sit to stand score of < / = 10 seconds in order to maximize functional independence and functional strength.ONGOING  5. Pt to improve self selected walking speed to > / = 0.64 m/s in order to maximize safety and confidence in household and community mobility. ONGOING  6. Pt to demonstrate ability to balance for > / = 30 seconds on all MCTSIB conditions with no sway in order to improve vestibular response and decrease fall risk. - MET   7. Pt to demonstrate ability to balance on B SLS for > / = 10 seconds in order to improve mechanics of gait and decrease fall risk. ONGOING      PLAN     Plan of care Certification: 11/29/2021 to 2/29/2022.     Outpatient Physical Therapy 1-2 times weekly for 10 weeks to include the following interventions: Gait Training, Manual Therapy, Moist Heat/ Ice, Neuromuscular Re-ed, Patient Education, Self Care, Therapeutic Activites and Therapeutic Exercise.     Andrea Fallon, SPT  1/18/2022

## 2022-01-25 ENCOUNTER — CLINICAL SUPPORT (OUTPATIENT)
Dept: REHABILITATION | Facility: HOSPITAL | Age: 22
End: 2022-01-25
Payer: MEDICAID

## 2022-01-25 DIAGNOSIS — R68.89 DECREASED STRENGTH, ENDURANCE, AND MOBILITY: ICD-10-CM

## 2022-01-25 DIAGNOSIS — R53.1 DECREASED STRENGTH, ENDURANCE, AND MOBILITY: ICD-10-CM

## 2022-01-25 DIAGNOSIS — Z74.09 DECREASED STRENGTH, ENDURANCE, AND MOBILITY: ICD-10-CM

## 2022-01-25 DIAGNOSIS — R26.9 GAIT ABNORMALITY: ICD-10-CM

## 2022-01-25 DIAGNOSIS — R26.89 BALANCE PROBLEMS: ICD-10-CM

## 2022-01-25 DIAGNOSIS — R29.898 LEFT HAND WEAKNESS: Primary | ICD-10-CM

## 2022-01-25 PROCEDURE — 97530 THERAPEUTIC ACTIVITIES: CPT | Mod: PN

## 2022-01-25 PROCEDURE — 97110 THERAPEUTIC EXERCISES: CPT | Mod: PN

## 2022-01-25 NOTE — PROGRESS NOTES
"  [facility]  Occupational Therapy Treatment Note and D/C summary    Date: 1/25/2022  Name: Stephany Cohn  Clinic Number: [id number]    Therapy Diagnosis:   Encounter Diagnosis   Name Primary?    Left hand weakness Yes     Physician: Abbey Sandhu PA-C    Physician Orders: OT Eval and treat  Medical Diagnosis: S/P  Shunt  Evaluation Date: 12/7/2021  Plan of Care Expiration Period: 2/18/22  Insurance Authorization period Expiration: 12/7/22  Date of Return to MD: TBD  Visit # / Visits Authorized: 1 / 1  FOTO: 5th visit     Time In: 1:15pm  Time Out: 1:55pm  Total Billable (one on one) Time: 40 minutes     Precautions: Standard and Fall       SUBJECTIVE     Pt reports: "feeling back to my normal"  She was compliant with home exercise program given last session.   Response to previous treatment:first tx  Functional change: first tx    Pain: 0/10  Location: n/a    OBJECTIVE     Objective Measures updated at progress report unless specified.    Treatment     Stephany received the treatments listed below:     Therapeutic activities to improve functional performance for 40  minutes, including:  -  HEP review with yellow putty   - gross grasp 2x10 L    - MP flex x20   - 2pt pinch x20   - 3pt pinch x20  - digit ext yellow band 2x20 L  - review HEPs and need for complaince      Patient Education and Home Exercises      Education provided:   - HEP  - Progress towards goals     Written Home Exercises Provided: yes.  Exercises were reviewed and Stephany was able to demonstrate them prior to the end of the session.  Stephany demonstrated fair  understanding of the HEP provided. See EMR under Patient Instructions for exercises provided during therapy sessions.       Assessment      Stephany has been educate don HEPs and was able to return demonstrate with visual and verbal cues. She has met all goals except wearing he rL hand brace regularly but this is due to poor fit. Instructed Pt. To reach out " to fitting provider for adjustment.  At this time she is reporting return to base line function and she is appropriate for d/c from skilled OT services.     Stephany is progressing well towards her goals and there are no updates to goals at this time. Pt prognosis is Good.       Pt's spiritual, cultural and educational needs considered and pt agreeable to plan of care and goals.    Anticipated barriers to occupational therapy: none    Goals:  Short Term Goals: 5 weeks   - Pt. Will be educated on HEPs and be able to return demonstrate with visual and verbal cues MET  - Pt. Will wear her braces regularly to reduce fall risk and reduce L hand tone PARTIALLY MET, L hand brance needs adjustment for better fit per pt. Report.         Plan   Certification Period/Plan of care expiration: 12/7/2021 to 2/18/22.     Pt. D/c this date secondary to goals being met.         MANUEL Leija

## 2022-01-25 NOTE — PROGRESS NOTES
[facility]   Physical Therapy Treatment Note     Name: Stephany Cohn  Clinic Number: [id number]    Therapy Diagnosis:   Encounter Diagnoses   Name Primary?    Decreased strength, endurance, and mobility     Gait abnormality     Balance problems      Physician: Abbey Sandhu PA-C    Visit Date: 1/25/2022    Physician Orders: PT Eval and Treat   Medical Diagnosis from Referral: Z98.2 (ICD-10-CM) - S/P  shunt  Evaluation Date: 11/29/2021  Authorization Period Expiration: 2/28/2022  Plan of Care Expiration: 3/1/2022  Visit # / Visits authorized: 3/12 +eval                 PN Due: 2/18/2022  Precautions: Standard and Fall and  Shunt    Time In: 2:30 PM  Time Out: 3:15 PM  Total Billable Time: 45 minutes      SUBJECTIVE     Pt reports: Pt states she feels good. She has been practicing stairs and sit to stands a lot at home and feels that she is getting stronger.    She was compliant with home exercise program.  Response to previous treatment: none  Functional change: ongoing    Pain: 0/10  Location: none reported    OBJECTIVE     No objective testing performed today    TREATMENT     Stephany received the treatments listed below:      received therapeutic exercises to develop strength and ROM for 45minutes including:    Nu-Step x 6 minutes L1    Sit to stands from 18 in mat x 10   while standing on foam 2x10    Lateral walking in // bars with YTB x 3 laps  Retro walking in // bars x 3 laps    Step taps to cone 3x10 ea LE    L SLS on foam with opposite LE on cone 3x30 ea    Tandem walking, modified x 3 laps in // bars    Shuttle with YTB at knees, 2 bands resistance, 2x10  +Single leg shuttle 2x10 ea LE   L- 0.5 band; R- 1 band    Not performed today:  Bridges 2x10  L LE SL Bridges x 10  L LE SAQ 2x10  L LE clamshells 2x10  L LE SLR 2x10  NBOS balance, firm 2x30  NBOS balance, firm, eyes closed 2x30  NBOS balance, foam, eyes open 2x30  NBOS balance, foam, eyes closed 2x30    PATIENT  EDUCATION AND HOME EXERCISES     Home Exercises Provided and Patient Education Provided     Education provided:   - role of PT, plan of care, goals, scheduling limitations, cancellation policies    Written Home Exercises Provided: HEP to be fully provided next session.     ASSESSMENT     Pt tolerated all exercises well today. She is very focused on improving her single leg balancing and particularly likes the cone tap exercise, on which she displayed improved single leg stance time as compared to last visits re-assessment. Significant valgus collapse was noted during the shuttle squats, pt required frequent verbal cueing to press out into the theraband to avoid her knees collapsing in. Pt noted some fatigue in the legs, L>R, following completion of today's therapy session. Discussed future plans to continue single leg strengthening and balance exercises for the next month, pt was agreeable to this.    Stephany Is progressing well towards her goals.   Pt prognosis is Good.     Pt will continue to benefit from skilled outpatient physical therapy to address the deficits listed in the problem list box on initial evaluation, provide pt/family education and to maximize pt's level of independence in the home and community environment.     Pt's spiritual, cultural and educational needs considered and pt agreeable to plan of care and goals.     Anticipated barriers to physical therapy: chronicity of CP diagnoses    Short Term Goals- 4 Weeks  1. Pt to demonstrate independence in performance of HEP in order to improve daily level of physical activity and improve carry over session to session.- MET  2. Pt to improve B LE strength by > / = 1/2 MMT score in order to improve strength for daily activities. -ONGOING  3. Pt to demonstrate TUG score of < / = 15 seconds in order to decrease fall risk and maximize functional strength for daily activities. -MET  4. Pt to demonstrate 5 time sit to stand score of < / = 12 seconds in order  to maximize functional independence and functional strength. - ONGOING  5. Pt to improve self selected walking speed to > / = 0.6 m/s in order to maximize safety and confidence in household and community mobility. MET  6. Pt to demonstrate ability to balance for > / = 15 seconds on all MCTSIB conditions with minimal sway in order to improve vestibular response and decrease fall risk. MET  7. Pt to demonstrate ability to balance on B SLS for > / = 5 seconds in order to improve mechanics of gait and decrease fall risk. ONGOING     Long Term Goals - 12 Weeks  1. Pt to demonstrate compliance with HEP > / = 3x per week in order to improve daily level of physical activity and improve carry over session to session. ONGOING  2. Pt to improve B LE strength by > / = 1 MMT score in order to improve strength for daily activities. ONGOING  3. Pt to demonstrate TUG score of < / = 12 seconds in order to decrease fall risk and maximize functional strength for daily activities. MET  4. Pt to demonstrate 5 time sit to stand score of < / = 10 seconds in order to maximize functional independence and functional strength.ONGOING  5. Pt to improve self selected walking speed to > / = 0.64 m/s in order to maximize safety and confidence in household and community mobility. ONGOING  6. Pt to demonstrate ability to balance for > / = 30 seconds on all MCTSIB conditions with no sway in order to improve vestibular response and decrease fall risk. - MET   7. Pt to demonstrate ability to balance on B SLS for > / = 10 seconds in order to improve mechanics of gait and decrease fall risk. ONGOING      PLAN     Plan of care Certification: 11/29/2021 to 2/29/2022.     Outpatient Physical Therapy 1-2 times weekly for 10 weeks to include the following interventions: Gait Training, Manual Therapy, Moist Heat/ Ice, Neuromuscular Re-ed, Patient Education, Self Care, Therapeutic Activites and Therapeutic Exercise.     Andrea Fallon, SPT  1/25/2022     I  certify that I was present in the room directing the student in service delivery and guiding them using my skilled judgment. As the co-signing therapist I have reviewed the students documentation and am responsible for the treatment, assessment, and plan.     Noreen Phillips, PT, DPT  1/25/2022

## 2022-02-02 ENCOUNTER — CLINICAL SUPPORT (OUTPATIENT)
Dept: REHABILITATION | Facility: HOSPITAL | Age: 22
End: 2022-02-02
Payer: MEDICAID

## 2022-02-02 DIAGNOSIS — R26.89 BALANCE PROBLEMS: ICD-10-CM

## 2022-02-02 DIAGNOSIS — R26.9 GAIT ABNORMALITY: ICD-10-CM

## 2022-02-02 DIAGNOSIS — R53.1 DECREASED STRENGTH, ENDURANCE, AND MOBILITY: ICD-10-CM

## 2022-02-02 DIAGNOSIS — R68.89 DECREASED STRENGTH, ENDURANCE, AND MOBILITY: ICD-10-CM

## 2022-02-02 DIAGNOSIS — Z74.09 DECREASED STRENGTH, ENDURANCE, AND MOBILITY: ICD-10-CM

## 2022-02-02 PROCEDURE — 97110 THERAPEUTIC EXERCISES: CPT | Mod: PN

## 2022-02-02 NOTE — PROGRESS NOTES
[facility]   Physical Therapy Treatment Note     Name: Stephany Cohn  Clinic Number: [id number]    Therapy Diagnosis:   Encounter Diagnoses   Name Primary?    Decreased strength, endurance, and mobility     Gait abnormality     Balance problems      Physician: Abbey Sandhu PA-C    Visit Date: 2/2/2022    Physician Orders: PT Eval and Treat   Medical Diagnosis from Referral: Z98.2 (ICD-10-CM) - S/P  shunt  Evaluation Date: 11/29/2021  Authorization Period Expiration: 2/28/2022  Plan of Care Expiration: 3/1/2022  Visit # / Visits authorized: 4/12 +eval                 PN Due: 2/18/2022  Precautions: Standard and Fall and  Shunt    Time In: 10:20 AM  Time Out: 11:00 AM  Total Billable Time: 40 minutes      SUBJECTIVE     Pt reports: No new reports or complaints today    She was compliant with home exercise program.  Response to previous treatment: none  Functional change: ongoing    Pain: 0/10  Location: none reported    OBJECTIVE     No objective testing performed today    TREATMENT     Stephany received the treatments listed below:      received therapeutic exercises to develop strength and ROM for 40 minutes including:    Nu-Step x 6 minutes L1    Sit to stands from 18 in mat x 10   while standing on foam 2x10    Lateral walking in // bars with YTB x 3 laps  Retro walking x 4 laps, 10 ft -min A from PT for safety     Step taps to cone 3x10 ea LE    L SLS on foam with opposite LE on cone 3x30 ea    Tandem walking, modified x 2 laps, 10 ft -min A from PT for safety    Shuttle with YTB at knees, 2 bands resistance, 2x10  Single leg shuttle 2x10 L LE, 0.5 band    Not performed today:  Bridges 2x10  L LE SL Bridges x 10  L LE SAQ 2x10  L LE clamshells 2x10  L LE SLR 2x10  NBOS balance, firm 2x30  NBOS balance, firm, eyes closed 2x30  NBOS balance, foam, eyes open 2x30  NBOS balance, foam, eyes closed 2x30    PATIENT EDUCATION AND HOME EXERCISES     Home Exercises Provided and Patient  Education Provided     Education provided:   - role of PT, plan of care, goals, scheduling limitations, cancellation policies    Written Home Exercises Provided: HEP to be fully provided next session.     ASSESSMENT     Pt tolerated all exercises well today. Pt with valgus noted to L knee and eversion noted to L ankle during SL shuttle squats. However, pt able to correct with tactile cueing and verbal cueing from PT. Challenged pt dynamic balance with retro walking and tandem walking outside of parallel bars today, min A required from PT for safety. However, good stepping strategies noted to maintain balance. Pt remains appropriate for therapy at this time.     Stephany Is progressing well towards her goals.   Pt prognosis is Good.     Pt will continue to benefit from skilled outpatient physical therapy to address the deficits listed in the problem list box on initial evaluation, provide pt/family education and to maximize pt's level of independence in the home and community environment.     Pt's spiritual, cultural and educational needs considered and pt agreeable to plan of care and goals.     Anticipated barriers to physical therapy: chronicity of CP diagnoses    Short Term Goals- 4 Weeks  1. Pt to demonstrate independence in performance of HEP in order to improve daily level of physical activity and improve carry over session to session.- MET  2. Pt to improve B LE strength by > / = 1/2 MMT score in order to improve strength for daily activities. -ONGOING  3. Pt to demonstrate TUG score of < / = 15 seconds in order to decrease fall risk and maximize functional strength for daily activities. -MET  4. Pt to demonstrate 5 time sit to stand score of < / = 12 seconds in order to maximize functional independence and functional strength. - ONGOING  5. Pt to improve self selected walking speed to > / = 0.6 m/s in order to maximize safety and confidence in household and community mobility. MET  6. Pt to demonstrate  ability to balance for > / = 15 seconds on all MCTSIB conditions with minimal sway in order to improve vestibular response and decrease fall risk. MET  7. Pt to demonstrate ability to balance on B SLS for > / = 5 seconds in order to improve mechanics of gait and decrease fall risk. ONGOING     Long Term Goals - 12 Weeks  1. Pt to demonstrate compliance with HEP > / = 3x per week in order to improve daily level of physical activity and improve carry over session to session. ONGOING  2. Pt to improve B LE strength by > / = 1 MMT score in order to improve strength for daily activities. ONGOING  3. Pt to demonstrate TUG score of < / = 12 seconds in order to decrease fall risk and maximize functional strength for daily activities. MET  4. Pt to demonstrate 5 time sit to stand score of < / = 10 seconds in order to maximize functional independence and functional strength.ONGOING  5. Pt to improve self selected walking speed to > / = 0.64 m/s in order to maximize safety and confidence in household and community mobility. ONGOING  6. Pt to demonstrate ability to balance for > / = 30 seconds on all MCTSIB conditions with no sway in order to improve vestibular response and decrease fall risk. - MET   7. Pt to demonstrate ability to balance on B SLS for > / = 10 seconds in order to improve mechanics of gait and decrease fall risk. ONGOING      PLAN     Plan of care Certification: 11/29/2021 to 2/29/2022.     Outpatient Physical Therapy 1-2 times weekly for 10 weeks to include the following interventions: Gait Training, Manual Therapy, Moist Heat/ Ice, Neuromuscular Re-ed, Patient Education, Self Care, Therapeutic Activites and Therapeutic Exercise.     Noreen Phillips, PT, DPT  2/2/2022

## 2022-02-08 ENCOUNTER — CLINICAL SUPPORT (OUTPATIENT)
Dept: REHABILITATION | Facility: HOSPITAL | Age: 22
End: 2022-02-08
Payer: MEDICAID

## 2022-02-08 DIAGNOSIS — R26.89 BALANCE PROBLEMS: ICD-10-CM

## 2022-02-08 DIAGNOSIS — Z74.09 DECREASED STRENGTH, ENDURANCE, AND MOBILITY: ICD-10-CM

## 2022-02-08 DIAGNOSIS — R53.1 DECREASED STRENGTH, ENDURANCE, AND MOBILITY: ICD-10-CM

## 2022-02-08 DIAGNOSIS — R26.9 GAIT ABNORMALITY: ICD-10-CM

## 2022-02-08 DIAGNOSIS — R68.89 DECREASED STRENGTH, ENDURANCE, AND MOBILITY: ICD-10-CM

## 2022-02-08 PROCEDURE — 97110 THERAPEUTIC EXERCISES: CPT | Mod: PN

## 2022-02-08 NOTE — PROGRESS NOTES
[facility]   Physical Therapy Treatment Note     Name: Stephany Cohn  Clinic Number: [id number]    Therapy Diagnosis:   Encounter Diagnoses   Name Primary?    Decreased strength, endurance, and mobility     Gait abnormality     Balance problems      Physician: Abbey Sandhu PA-C    Visit Date: 2/8/2022    Physician Orders: PT Eval and Treat   Medical Diagnosis from Referral: Z98.2 (ICD-10-CM) - S/P  shunt  Evaluation Date: 11/29/2021  Authorization Period Expiration: 2/28/2022  Plan of Care Expiration: 3/1/2022   Visit # / Visits authorized: 4/12 +eval                 PN Due: 2/18/2022  Precautions: Standard and Fall and  Shunt    Time In: 10:15 AM  Time Out: 11:00 AM  Total Billable Time: 45 minutes      SUBJECTIVE     Pt reports: Pt is feeling good, she has no complaints today    She was compliant with home exercise program.  Response to previous treatment: none  Functional change: ongoing    Pain: 0/10  Location: none reported    OBJECTIVE     No objective testing performed today    TREATMENT     Stephany received the treatments listed below:      received therapeutic exercises to develop strength and ROM for 45 minutes including:    Nu-Step x 6 minutes L1    Sit to stands from 18 in mat x 10   while standing on foam 2x10    Lateral walking in // bars with YTB x 3 laps  Retro walking x 4 laps, 10 ft -min A from PT for safety     Step taps to cone 3x10 ea LE    L SLS on foam with opposite LE on cone 3x30 ea    Tandem walking, modified x 2 laps, 10 ft -min A from PT for safety    Shuttle with YTB at knees, 2 bands resistance, 2x10  Single leg shuttle 2x10 L LE, 0.5 band    Single leg balancing- RLE 5s, LLE 2s    Not performed today:  Bridges 2x10  L LE SL Bridges x 10  L LE SAQ 2x10  L LE clamshells 2x10  L LE SLR 2x10  NBOS balance, firm 2x30  NBOS balance, firm, eyes closed 2x30  NBOS balance, foam, eyes open 2x30  NBOS balance, foam, eyes closed 2x30    PATIENT EDUCATION AND  HOME EXERCISES     Home Exercises Provided and Patient Education Provided     Education provided:   - role of PT, plan of care, goals, scheduling limitations, cancellation policies    Written Home Exercises Provided: HEP to be fully provided next session.     ASSESSMENT     Pt able to complete all exercises today. She is easily frustrated by the more difficult balance exercises and requires positive reinforcement to maintain a productive attitude. She displays improving balance on the single leg cone balancing exercises and is observed to maintain single leg stance for longer periods than she is able to do when balancing without a target.    Stephany Is progressing well towards her goals.   Pt prognosis is Good.     Pt will continue to benefit from skilled outpatient physical therapy to address the deficits listed in the problem list box on initial evaluation, provide pt/family education and to maximize pt's level of independence in the home and community environment.     Pt's spiritual, cultural and educational needs considered and pt agreeable to plan of care and goals.     Anticipated barriers to physical therapy: chronicity of CP diagnoses    Short Term Goals- 4 Weeks  1. Pt to demonstrate independence in performance of HEP in order to improve daily level of physical activity and improve carry over session to session.- MET  2. Pt to improve B LE strength by > / = 1/2 MMT score in order to improve strength for daily activities. -ONGOING  3. Pt to demonstrate TUG score of < / = 15 seconds in order to decrease fall risk and maximize functional strength for daily activities. -MET  4. Pt to demonstrate 5 time sit to stand score of < / = 12 seconds in order to maximize functional independence and functional strength. - ONGOING  5. Pt to improve self selected walking speed to > / = 0.6 m/s in order to maximize safety and confidence in household and community mobility. MET  6. Pt to demonstrate ability to balance for > /  = 15 seconds on all MCTSIB conditions with minimal sway in order to improve vestibular response and decrease fall risk. MET  7. Pt to demonstrate ability to balance on B SLS for > / = 5 seconds in order to improve mechanics of gait and decrease fall risk. ONGOING     Long Term Goals - 12 Weeks  1. Pt to demonstrate compliance with HEP > / = 3x per week in order to improve daily level of physical activity and improve carry over session to session. ONGOING  2. Pt to improve B LE strength by > / = 1 MMT score in order to improve strength for daily activities. ONGOING  3. Pt to demonstrate TUG score of < / = 12 seconds in order to decrease fall risk and maximize functional strength for daily activities. MET  4. Pt to demonstrate 5 time sit to stand score of < / = 10 seconds in order to maximize functional independence and functional strength.ONGOING  5. Pt to improve self selected walking speed to > / = 0.64 m/s in order to maximize safety and confidence in household and community mobility. ONGOING  6. Pt to demonstrate ability to balance for > / = 30 seconds on all MCTSIB conditions with no sway in order to improve vestibular response and decrease fall risk. - MET   7. Pt to demonstrate ability to balance on B SLS for > / = 10 seconds in order to improve mechanics of gait and decrease fall risk. ONGOING      PLAN     Plan of care Certification: 11/29/2021 to 2/29/2022.     Outpatient Physical Therapy 1-2 times weekly for 10 weeks to include the following interventions: Gait Training, Manual Therapy, Moist Heat/ Ice, Neuromuscular Re-ed, Patient Education, Self Care, Therapeutic Activites and Therapeutic Exercise.     Andrea Fallon, SPT  2/8/2022    I certify that I was present in the room directing the student in service delivery and guiding them using my skilled judgment. As the co-signing therapist I have reviewed the students documentation and am responsible for the treatment, assessment, and plan.     Noreen  Jacqueline, PT, DPT  2/8/2022

## 2022-02-15 ENCOUNTER — CLINICAL SUPPORT (OUTPATIENT)
Dept: REHABILITATION | Facility: HOSPITAL | Age: 22
End: 2022-02-15
Payer: MEDICAID

## 2022-02-15 DIAGNOSIS — Z74.09 DECREASED STRENGTH, ENDURANCE, AND MOBILITY: ICD-10-CM

## 2022-02-15 DIAGNOSIS — R26.89 BALANCE PROBLEMS: ICD-10-CM

## 2022-02-15 DIAGNOSIS — R68.89 DECREASED STRENGTH, ENDURANCE, AND MOBILITY: ICD-10-CM

## 2022-02-15 DIAGNOSIS — R53.1 DECREASED STRENGTH, ENDURANCE, AND MOBILITY: ICD-10-CM

## 2022-02-15 DIAGNOSIS — R26.9 GAIT ABNORMALITY: ICD-10-CM

## 2022-02-15 PROCEDURE — 97110 THERAPEUTIC EXERCISES: CPT | Mod: PN

## 2022-02-15 NOTE — PROGRESS NOTES
[facility] OUTPATIENT THERAPY AND WELLNESS   Physical Therapy Treatment Note     Name: Stephany Cohn  [facility] Number: [id number]    Therapy Diagnosis:   Encounter Diagnoses   Name Primary?    Decreased strength, endurance, and mobility     Gait abnormality     Balance problems      Physician: Abbey Sandhu PA-C    Visit Date: 2/15/2022    Physician Orders: PT Eval and Treat   Medical Diagnosis from Referral: Z98.2 (ICD-10-CM) - S/P  shunt  Evaluation Date: 11/29/2021  Authorization Period Expiration: 2/28/2022  Plan of Care Expiration: 3/1/2022   Visit # / Visits authorized: 6/12 +eval                 PN Due: 2/18/2022  Precautions: Standard and Fall and  Shunt    Time In: 10:22 AM  Time Out: 11:05 AM  Total Billable Time:  43 minutes      SUBJECTIVE     Pt reports: No falls to report. States her walking and her balance has been pretty good lately.     She was compliant with home exercise program.  Response to previous treatment: none  Functional change: ongoing    Pain: 0/10  Location: none reported    OBJECTIVE       Lower Extremity Strength    RLE- 11/29/21 RLE- 1/18/22 LLE- 11/29/21 LLE- 1/18/22 L LE  2/15/22   Hip Flexion: 5/5 5 4+/5 5 5   Hip Extension:  5/5 4 4/5 4+ 5   Hip Abduction: 4/5 4 4-/5 4 5   Knee Extension: 4+/5 5 4/5 5 4+   Knee Flexion: 4/5 5 3+/5 5 5   Ankle Dorsiflexion: 5/5 5 2/5 2+ 2+   Ankle Plantarflexion: 5/5 NT NT 3 2-   Ankle Inversion: 5/5 5 NT 3 trace   Ankle Eversion: 5/5 5 NT 3 3      Abdominal Strength: (evaluation: 4/5)          Evaluation 1/18/22 2/15/22   Single Limb Stance R LE < 1 second  (<10 sec = HIGH FALL RISK) 2s 5 seconds   Single Limb Stance L LE < 1 second  (<10 sec = HIGH FALL RISK) <1s 2 seconds   5 times sit-stand 14 seconds  >12 sec= fall risk for general elderly  >16 sec= fall risk for Parkinson's disease  >10 sec= balance/vestibular dysfunction (<61 y/o)  >14.2 sec= balance/vestibular dysfunction (>61 y/o)  >12 sec= fall risk for CVA 28s no UE 18 seconds no UE  support      Postural control:  MCTSIB:  1. Eyes Open/feet together/Firm: NT (30 seconds)  2. Eyes Closed/feet together/Firm: NT (30 seconds, no sway)  3. Eyes Open/feet together/Foam: 30 seconds, no sway (30 seconds, min sway)  4. Eyes Closed/feet together/Foam: 12 seconds (30 seconds, min sway)     GAIT DEVIATIONS:  Gait component performance:   Decreased L LE WB time  Decreased R LE step length  Forefoot contact on L at initial contact  L knee hyperextension during stance  Increased knee/hip flexion during L LE swing phase  * Above impairments indicate decreased gait safety and increased fall risk.       Evaluation 1/18/22 2/15/22   Timed Up and Go 17 sec  < 20 sec safe for independent transfers,     < 30 sec assist required for transfers 8s NT   Self Selected Gait Speed 0.54 m/sec (6m/11s) 0.6 m/s (6m/10s) 1.2 m/s (6m/5s)   Fast Walking Speed 0.6 m/sec (6m/10s) 1.0 m/s (6m/6s) 1.2 m/s (6m/5s)        TREATMENT     Stephany received the treatments listed below:      received therapeutic exercises to develop strength and ROM for 45 minutes including:    Testing listed above.    Nu-Step x 6 minutes L1    Lateral walking x 3 feet (10x)    Step taps to 6 in step x10 ea LE    SLS modified (opposite LE on step) 3x30 ea - intermittent UE support during L SLS    Not performed today:  Bridges 2x10  L LE SL Bridges x 10  L LE SAQ 2x10  L LE clamshells 2x10  L LE SLR 2x10  NBOS balance, firm 2x30  NBOS balance, firm, eyes closed 2x30  NBOS balance, foam, eyes open 2x30  NBOS balance, foam, eyes closed 2x30    PATIENT EDUCATION AND HOME EXERCISES     Home Exercises Provided and Patient Education Provided     Education provided:   - role of PT, plan of care, goals, scheduling limitations, cancellation policies    Written Home Exercises Provided: HEP to be fully provided next session.     ASSESSMENT     Pt tolerated session well overall. Progress note performed this session. Pt with improvement noted in B LE strength,  particularly to L hip girdle musculature. Pt with improved functional strength as demonstrated by decreased time on 5 time sit to stand testing. Pt with improved self selected gait speed, current speed indicative of community ambulator without significant fall risk. Pt with slight improvement noted in B SLS, however, unable to hold for greater than 5 seconds on either side. Pt with good progress in therapy, appears at baseline for functional strength, mobility, and balance. Pt given updated HEP for balance/strengthening activities with instructions to perform with handrail or at counter and stop if unable to perform safely. Pt met 5/7 STGs and 4/7 LTGs at this time. Secondary to pt progress with therapy, pt is appropriate for discharge from skilled PT services.    Short Term Goals- 4 Weeks  1. Pt to demonstrate independence in performance of HEP in order to improve daily level of physical activity and improve carry over session to session.- MET  2. Pt to improve B LE strength by > / = 1/2 MMT score in order to improve strength for daily activities. -MET 2/15/22  3. Pt to demonstrate TUG score of < / = 15 seconds in order to decrease fall risk and maximize functional strength for daily activities. -MET  4. Pt to demonstrate 5 time sit to stand score of < / = 12 seconds in order to maximize functional independence and functional strength. - not met, improved and no UE use  5. Pt to improve self selected walking speed to > / = 0.6 m/s in order to maximize safety and confidence in household and community mobility. MET  6. Pt to demonstrate ability to balance for > / = 15 seconds on all MCTSIB conditions with minimal sway in order to improve vestibular response and decrease fall risk. MET  7. Pt to demonstrate ability to balance on B SLS for > / = 5 seconds in order to improve mechanics of gait and decrease fall risk. Partially met on R LE     Long Term Goals - 12 Weeks  1. Pt to demonstrate compliance with HEP > / = 3x  per week in order to improve daily level of physical activity and improve carry over session to session. MET 2/15/22  2. Pt to improve B LE strength by > / = 1 MMT score in order to improve strength for daily activities. MET 2/15/22  3. Pt to demonstrate TUG score of < / = 12 seconds in order to decrease fall risk and maximize functional strength for daily activities. MET 1/18/22  4. Pt to demonstrate 5 time sit to stand score of < / = 10 seconds in order to maximize functional independence and functional strength. Not met, improved  5. Pt to improve self selected walking speed to > / = 0.64 m/s in order to maximize safety and confidence in household and community mobility. MET 2/15/22  6. Pt to demonstrate ability to balance for > / = 30 seconds on all MCTSIB conditions with no sway in order to improve vestibular response and decrease fall risk. - partially met, not met on condition 4   7. Pt to demonstrate ability to balance on B SLS for > / = 10 seconds in order to improve mechanics of gait and decrease fall risk. Not met      PLAN     Plan of care Certification: 11/29/2021 to 2/29/2022.     Discharge from skilled PT services with updated HEP.     Noreen Phillips, PT, DPT  2/15/2022

## 2022-02-15 NOTE — PLAN OF CARE
[facility]  Physical Therapy Discharge Note    Name: Stephany Cohn  Clinic Number: [id number]    Therapy Diagnosis:   Encounter Diagnoses   Name Primary?    Decreased strength, endurance, and mobility     Gait abnormality     Balance problems      Physician: Abbey Sandhu PA-C    Physician Orders: PT Eval and Treat   Medical Diagnosis from Referral: Z98.2 (ICD-10-CM) - S/P  shunt  Evaluation Date: 11/29/2021    Date of Last visit: 2/15/22  Total Visits Received: 8    ASSESSMENT      Pt tolerated session well overall. Progress note performed this session. Pt with improvement noted in B LE strength, particularly to L hip girdle musculature. Pt with improved functional strength as demonstrated by decreased time on 5 time sit to stand testing. Pt with improved self selected gait speed, current speed indicative of community ambulator without significant fall risk. Pt with slight improvement noted in B SLS, however, unable to hold for greater than 5 seconds on either side. Pt with good progress in therapy, appears at baseline for functional strength, mobility, and balance. Pt given updated HEP for balance/strengthening activities with instructions to perform with handrail or at counter and stop if unable to perform safely. Pt met 5/7 STGs and 4/7 LTGs at this time. Secondary to pt progress with therapy, pt is appropriate for discharge from skilled PT services.     Short Term Goals- 4 Weeks  1. Pt to demonstrate independence in performance of HEP in order to improve daily level of physical activity and improve carry over session to session.- MET  2. Pt to improve B LE strength by > / = 1/2 MMT score in order to improve strength for daily activities. -MET 2/15/22  3. Pt to demonstrate TUG score of < / = 15 seconds in order to decrease fall risk and maximize functional strength for daily activities. -MET  4. Pt to demonstrate 5 time sit to stand score of < / = 12 seconds in order to  maximize functional independence and functional strength. - not met, improved and no UE use  5. Pt to improve self selected walking speed to > / = 0.6 m/s in order to maximize safety and confidence in household and community mobility. MET  6. Pt to demonstrate ability to balance for > / = 15 seconds on all MCTSIB conditions with minimal sway in order to improve vestibular response and decrease fall risk. MET  7. Pt to demonstrate ability to balance on B SLS for > / = 5 seconds in order to improve mechanics of gait and decrease fall risk. Partially met on R LE     Long Term Goals - 12 Weeks  1. Pt to demonstrate compliance with HEP > / = 3x per week in order to improve daily level of physical activity and improve carry over session to session. MET 2/15/22  2. Pt to improve B LE strength by > / = 1 MMT score in order to improve strength for daily activities. MET 2/15/22  3. Pt to demonstrate TUG score of < / = 12 seconds in order to decrease fall risk and maximize functional strength for daily activities. MET 1/18/22  4. Pt to demonstrate 5 time sit to stand score of < / = 10 seconds in order to maximize functional independence and functional strength. Not met, improved  5. Pt to improve self selected walking speed to > / = 0.64 m/s in order to maximize safety and confidence in household and community mobility. MET 2/15/22  6. Pt to demonstrate ability to balance for > / = 30 seconds on all MCTSIB conditions with no sway in order to improve vestibular response and decrease fall risk. - partially met, not met on condition 4   7. Pt to demonstrate ability to balance on B SLS for > / = 10 seconds in order to improve mechanics of gait and decrease fall risk. Not met    Discharge reason: Patient has reached the maximum rehab potential for the present time    PLAN   This patient is discharged from Physical Therapy    Noreen Phillips, PT, DPT  2/15/2022

## 2022-03-29 ENCOUNTER — OFFICE VISIT (OUTPATIENT)
Dept: NEUROSURGERY | Facility: CLINIC | Age: 22
End: 2022-03-29
Payer: MEDICAID

## 2022-03-29 ENCOUNTER — HOSPITAL ENCOUNTER (OUTPATIENT)
Dept: RADIOLOGY | Facility: HOSPITAL | Age: 22
Discharge: HOME | End: 2022-03-29
Attending: STUDENT IN AN ORGANIZED HEALTH CARE EDUCATION/TRAINING PROGRAM
Payer: MEDICAID

## 2022-03-29 VITALS
HEIGHT: 59 IN | BODY MASS INDEX: 20.87 KG/M2 | SYSTOLIC BLOOD PRESSURE: 124 MMHG | DIASTOLIC BLOOD PRESSURE: 88 MMHG | HEART RATE: 74 BPM | WEIGHT: 103.5 LBS

## 2022-03-29 DIAGNOSIS — G80.9 CEREBRAL PALSY, UNSPECIFIED TYPE: ICD-10-CM

## 2022-03-29 DIAGNOSIS — G40.219 LOCALIZATION-RELATED (FOCAL) (PARTIAL) SYMPTOMATIC EPILEPSY AND EPILEPTIC SYNDROMES WITH COMPLEX PARTIAL SEIZURES, INTRACTABLE, WITHOUT STATUS EPILEPTICUS: ICD-10-CM

## 2022-03-29 DIAGNOSIS — G91.9 HYDROCEPHALUS, UNSPECIFIED TYPE: Primary | ICD-10-CM

## 2022-03-29 DIAGNOSIS — Z98.2 VP (VENTRICULOPERITONEAL) SHUNT STATUS: ICD-10-CM

## 2022-03-29 PROCEDURE — 99213 OFFICE O/P EST LOW 20 MIN: CPT | Mod: PBBFAC,25 | Performed by: STUDENT IN AN ORGANIZED HEALTH CARE EDUCATION/TRAINING PROGRAM

## 2022-03-29 PROCEDURE — 99999 PR PBB SHADOW E&M-EST. PATIENT-LVL III: CPT | Mod: PBBFAC,,, | Performed by: STUDENT IN AN ORGANIZED HEALTH CARE EDUCATION/TRAINING PROGRAM

## 2022-03-29 PROCEDURE — 1160F PR REVIEW ALL MEDS BY PRESCRIBER/CLIN PHARMACIST DOCUMENTED: ICD-10-PCS | Mod: CPTII,,, | Performed by: STUDENT IN AN ORGANIZED HEALTH CARE EDUCATION/TRAINING PROGRAM

## 2022-03-29 PROCEDURE — 99213 PR OFFICE/OUTPT VISIT, EST, LEVL III, 20-29 MIN: ICD-10-PCS | Mod: S$PBB,,, | Performed by: STUDENT IN AN ORGANIZED HEALTH CARE EDUCATION/TRAINING PROGRAM

## 2022-03-29 PROCEDURE — 70551 MRI BRAIN STEM W/O DYE: CPT | Mod: TC

## 2022-03-29 PROCEDURE — 1160F RVW MEDS BY RX/DR IN RCRD: CPT | Mod: CPTII,,, | Performed by: STUDENT IN AN ORGANIZED HEALTH CARE EDUCATION/TRAINING PROGRAM

## 2022-03-29 PROCEDURE — 3008F PR BODY MASS INDEX (BMI) DOCUMENTED: ICD-10-PCS | Mod: CPTII,,, | Performed by: STUDENT IN AN ORGANIZED HEALTH CARE EDUCATION/TRAINING PROGRAM

## 2022-03-29 PROCEDURE — 1159F MED LIST DOCD IN RCRD: CPT | Mod: CPTII,,, | Performed by: STUDENT IN AN ORGANIZED HEALTH CARE EDUCATION/TRAINING PROGRAM

## 2022-03-29 PROCEDURE — 99999 PR PBB SHADOW E&M-EST. PATIENT-LVL III: ICD-10-PCS | Mod: PBBFAC,,, | Performed by: STUDENT IN AN ORGANIZED HEALTH CARE EDUCATION/TRAINING PROGRAM

## 2022-03-29 PROCEDURE — 70551 MRI BRAIN LIMITED(SHUNT CHECK) WITHOUT CONTRAST: ICD-10-PCS | Mod: 26,,, | Performed by: RADIOLOGY

## 2022-03-29 PROCEDURE — 99213 OFFICE O/P EST LOW 20 MIN: CPT | Mod: S$PBB,,, | Performed by: STUDENT IN AN ORGANIZED HEALTH CARE EDUCATION/TRAINING PROGRAM

## 2022-03-29 PROCEDURE — 3008F BODY MASS INDEX DOCD: CPT | Mod: CPTII,,, | Performed by: STUDENT IN AN ORGANIZED HEALTH CARE EDUCATION/TRAINING PROGRAM

## 2022-03-29 PROCEDURE — 3079F DIAST BP 80-89 MM HG: CPT | Mod: CPTII,,, | Performed by: STUDENT IN AN ORGANIZED HEALTH CARE EDUCATION/TRAINING PROGRAM

## 2022-03-29 PROCEDURE — 70551 MRI BRAIN STEM W/O DYE: CPT | Mod: 26,,, | Performed by: RADIOLOGY

## 2022-03-29 PROCEDURE — 3079F PR MOST RECENT DIASTOLIC BLOOD PRESSURE 80-89 MM HG: ICD-10-PCS | Mod: CPTII,,, | Performed by: STUDENT IN AN ORGANIZED HEALTH CARE EDUCATION/TRAINING PROGRAM

## 2022-03-29 PROCEDURE — 3074F PR MOST RECENT SYSTOLIC BLOOD PRESSURE < 130 MM HG: ICD-10-PCS | Mod: CPTII,,, | Performed by: STUDENT IN AN ORGANIZED HEALTH CARE EDUCATION/TRAINING PROGRAM

## 2022-03-29 PROCEDURE — 3074F SYST BP LT 130 MM HG: CPT | Mod: CPTII,,, | Performed by: STUDENT IN AN ORGANIZED HEALTH CARE EDUCATION/TRAINING PROGRAM

## 2022-03-29 PROCEDURE — 1159F PR MEDICATION LIST DOCUMENTED IN MEDICAL RECORD: ICD-10-PCS | Mod: CPTII,,, | Performed by: STUDENT IN AN ORGANIZED HEALTH CARE EDUCATION/TRAINING PROGRAM

## 2022-03-29 NOTE — PROGRESS NOTES
Neurosurgery  Established Patient    SUBJECTIVE:     History of Present Illness:  Stephany Cohn is a 21 y.o. female with h/o epilepsy and hydrocephalus who is status post right parietoccipital shunt revisions on 11/19/21 & 11/23/21 (Strata II at 1.5).     Interval 3/29/22: Stephany was last seen on 12/28/21 and returns for scheduled follow up after shunt MR.  She continues to do well since her shunt revision with no recurrence of symptoms or seizures. No headaches, no nausea/vomiting or sleepiness.    Review of patient's allergies indicates:   Allergen Reactions    Sulfur Hives    Sulfa (sulfonamide antibiotics)        Current Outpatient Medications   Medication Sig Dispense Refill    diazePAM (DIASTAT) 2.5 mg Kit Place 10 mg rectally as needed (for seizures).      folic acid (FOLVITE) 1 MG tablet Take 1 tablet (1 mg total) by mouth once daily. 90 tablet 3    galcanezumab-gnlm 120 mg/mL Syrg Inject 120 mg into the skin every 28 days. maintenance dose 1 Syringe 6    levETIRAcetam (KEPPRA) 100 mg/mL Soln Take 10 mLs (1,000 mg total) by mouth 2 (two) times daily. 1800 mL 3    lisinopriL 10 MG tablet Take 1 tablet (10 mg total) by mouth once daily. 90 tablet 3    midazolam (NAYZILAM) 5 mg/spray (0.1 mL) Spry 5 mLs by Nasal route every 5 (five) minutes as needed (cluster seizures). 4 each 3    OXcarbazepine (TRILEPTAL) 300 mg/5 mL (60 mg/mL) Susp Take 10 mLs (600 mg total) by mouth 2 (two) times daily. 1800 mL 3    perampaneL (FYCOMPA) 8 mg tablet Take 1 tablet (8 mg total) by mouth every evening. 30 tablet 5    vitamin D (VITAMIN D3) 1000 units Tab Take 4,000 Units by mouth once daily.       No current facility-administered medications for this visit.       Past Medical History:   Diagnosis Date    Cerebral palsy     Epilepsy     Hydrocephalus     Left spastic hemiparesis      Past Surgical History:   Procedure Laterality Date    REVISION OF VENTRICULOPERITONEAL SHUNT  2005    REVISION OF  "VENTRICULOPERITONEAL SHUNT Right 11/19/2021    Procedure: REVISION, SHUNT, VENTRICULOPERITONEAL;  Surgeon: Lien Arora MD;  Location: [facility] OR 2ND FLR;  Service: Neurosurgery;  Laterality: Right;  proximal    REVISION OF VENTRICULOPERITONEAL SHUNT Right 11/23/2021    Procedure: REVISION, SHUNT, VENTRICULOPERITONEAL;  Surgeon: Lien Arora MD;  Location: [facility] OR 2ND FLR;  Service: Neurosurgery;  Laterality: Right;    VENTRICULOPERITONEAL SHUNT Right     approx when pt. was 1 y/o then revised in jan. 2005     Family History     Problem Relation (Age of Onset)    Arthritis Mother    Cancer Maternal Aunt    Hypertension Mother, Father, Brother    Other Mother        Social History     Socioeconomic History    Marital status: Single   Tobacco Use    Smoking status: Never Smoker    Smokeless tobacco: Never Used   Substance and Sexual Activity    Alcohol use: Never    Drug use: Never    Sexual activity: Never       Review of Systems   All other systems reviewed and are negative.      OBJECTIVE:     Vital Signs  Pulse: 74  BP: 124/88  Pain Score: 0-No pain  Height: 4' 11" (149.9 cm)  Weight: 47 kg (103 lb 8.1 oz)  Body mass index is 20.91 kg/m².    Physical Exam:  Nursing note and vitals reviewed.    General: No apparent distress  Head: normocephalic, atraumatic, no fluid along shunt tubing, shunt pumps & refills easily  Neurologic: alert & oriented, responds appropriately and FCx4  Cranial nerves: face symmetric, tongue midline, CN II-XII grossly intact.   Eyes: pupils equal, round, reactive to light with accomodation, EOMI.   Pulmonary: no signs of respiratory distress, symmetric expansion  Skin: Skin is cool, dry and intact.  Sensory: intact to light touch throughout  Motor Strength: chronic left hemiparesis- moves against gravity      Diagnostic Results:  MR shunt protocol was personally reviewed- ventricular size grossly stable from prior study dated 11/23/21    ASSESSMENT/PLAN:   22 yo female with " history of shunted hydrocephalus since birth status post right shunt revisions on 11/19/21 & 11/23/21 (Strata @ 1.5) who is doing well today without radiographic or clinical findings concerning for shunt malfunction.  Her shunt was interrogated and valve reprogrammed to 1.5    - f/u 6 months SS & MR shunt protocol        Note dictated with voice recognition software, please excuse any grammatical errors.

## 2022-04-12 DIAGNOSIS — G40.219 LOCALIZATION-RELATED (FOCAL) (PARTIAL) SYMPTOMATIC EPILEPSY AND EPILEPTIC SYNDROMES WITH COMPLEX PARTIAL SEIZURES, INTRACTABLE, WITHOUT STATUS EPILEPTICUS: ICD-10-CM

## 2022-05-09 DIAGNOSIS — G40.219 LOCALIZATION-RELATED (FOCAL) (PARTIAL) SYMPTOMATIC EPILEPSY AND EPILEPTIC SYNDROMES WITH COMPLEX PARTIAL SEIZURES, INTRACTABLE, WITHOUT STATUS EPILEPTICUS: ICD-10-CM

## 2022-05-09 NOTE — TELEPHONE ENCOUNTER
Perampanel 8 mg nightly    Galilea Berger MD PhD  Neurology-Epilepsy  [facility].

## 2022-05-28 DIAGNOSIS — G40.219 LOCALIZATION-RELATED (FOCAL) (PARTIAL) SYMPTOMATIC EPILEPSY AND EPILEPTIC SYNDROMES WITH COMPLEX PARTIAL SEIZURES, INTRACTABLE, WITHOUT STATUS EPILEPTICUS: ICD-10-CM

## 2022-07-06 ENCOUNTER — PATIENT MESSAGE (OUTPATIENT)
Dept: NEUROLOGY | Facility: CLINIC | Age: 22
End: 2022-07-06
Payer: MEDICAID

## 2022-07-06 DIAGNOSIS — G40.219 LOCALIZATION-RELATED (FOCAL) (PARTIAL) SYMPTOMATIC EPILEPSY AND EPILEPTIC SYNDROMES WITH COMPLEX PARTIAL SEIZURES, INTRACTABLE, WITHOUT STATUS EPILEPTICUS: Primary | ICD-10-CM

## 2022-07-07 RX ORDER — DIAZEPAM 10 MG/100UL
1 SPRAY NASAL
Qty: 2 EACH | Refills: 5 | Status: SHIPPED | OUTPATIENT
Start: 2022-07-07 | End: 2023-01-09 | Stop reason: SDUPTHER

## 2022-07-07 NOTE — TELEPHONE ENCOUNTER
Breakthrough seizure   Diazepam nasal spray  We will call to make a follow-up appointment    Galilea Berger MD PhD  Neurology-Epilepsy  [facility].       Called and spoke with Insurance company in re: to filling out new forms.  Patient has coverage until May 8th 2017, forms can wait until then.  Placed forms on PCP desk.    INGRID Horta MA

## 2022-07-08 ENCOUNTER — PATIENT MESSAGE (OUTPATIENT)
Dept: NEUROLOGY | Facility: CLINIC | Age: 22
End: 2022-07-08
Payer: MEDICAID

## 2022-07-22 DIAGNOSIS — G40.219 LOCALIZATION-RELATED (FOCAL) (PARTIAL) SYMPTOMATIC EPILEPSY AND EPILEPTIC SYNDROMES WITH COMPLEX PARTIAL SEIZURES, INTRACTABLE, WITHOUT STATUS EPILEPTICUS: ICD-10-CM

## 2022-07-22 RX ORDER — OXCARBAZEPINE 60 MG/ML
600 SUSPENSION ORAL 2 TIMES DAILY
Qty: 1800 ML | Refills: 3 | Status: SHIPPED | OUTPATIENT
Start: 2022-07-22 | End: 2022-08-15 | Stop reason: SDUPTHER

## 2022-08-15 ENCOUNTER — LAB VISIT (OUTPATIENT)
Dept: LAB | Facility: HOSPITAL | Age: 22
End: 2022-08-15
Payer: MEDICAID

## 2022-08-15 ENCOUNTER — OFFICE VISIT (OUTPATIENT)
Dept: NEUROLOGY | Facility: CLINIC | Age: 22
End: 2022-08-15
Payer: MEDICAID

## 2022-08-15 VITALS
SYSTOLIC BLOOD PRESSURE: 119 MMHG | BODY MASS INDEX: 20.54 KG/M2 | WEIGHT: 101.88 LBS | DIASTOLIC BLOOD PRESSURE: 81 MMHG | HEIGHT: 59 IN | HEART RATE: 87 BPM

## 2022-08-15 DIAGNOSIS — G40.219 LOCALIZATION-RELATED (FOCAL) (PARTIAL) SYMPTOMATIC EPILEPSY AND EPILEPTIC SYNDROMES WITH COMPLEX PARTIAL SEIZURES, INTRACTABLE, WITHOUT STATUS EPILEPTICUS: Primary | ICD-10-CM

## 2022-08-15 DIAGNOSIS — G40.219 LOCALIZATION-RELATED (FOCAL) (PARTIAL) SYMPTOMATIC EPILEPSY AND EPILEPTIC SYNDROMES WITH COMPLEX PARTIAL SEIZURES, INTRACTABLE, WITHOUT STATUS EPILEPTICUS: ICD-10-CM

## 2022-08-15 DIAGNOSIS — F06.30 MOOD DISORDER DUE TO MEDICAL CONDITION: ICD-10-CM

## 2022-08-15 PROCEDURE — 80177 DRUG SCRN QUAN LEVETIRACETAM: CPT

## 2022-08-15 PROCEDURE — 1159F PR MEDICATION LIST DOCUMENTED IN MEDICAL RECORD: ICD-10-PCS | Mod: CPTII,,,

## 2022-08-15 PROCEDURE — 4010F PR ACE/ARB THEARPY RXD/TAKEN: ICD-10-PCS | Mod: CPTII,,,

## 2022-08-15 PROCEDURE — 3079F PR MOST RECENT DIASTOLIC BLOOD PRESSURE 80-89 MM HG: ICD-10-PCS | Mod: CPTII,,,

## 2022-08-15 PROCEDURE — 3079F DIAST BP 80-89 MM HG: CPT | Mod: CPTII,,,

## 2022-08-15 PROCEDURE — 99213 OFFICE O/P EST LOW 20 MIN: CPT | Mod: PBBFAC

## 2022-08-15 PROCEDURE — 36415 COLL VENOUS BLD VENIPUNCTURE: CPT

## 2022-08-15 PROCEDURE — 80339 ANTIEPILEPTICS NOS 1-3: CPT

## 2022-08-15 PROCEDURE — 99999 PR PBB SHADOW E&M-EST. PATIENT-LVL III: CPT | Mod: PBBFAC,,,

## 2022-08-15 PROCEDURE — 3008F PR BODY MASS INDEX (BMI) DOCUMENTED: ICD-10-PCS | Mod: CPTII,,,

## 2022-08-15 PROCEDURE — 3074F PR MOST RECENT SYSTOLIC BLOOD PRESSURE < 130 MM HG: ICD-10-PCS | Mod: CPTII,,,

## 2022-08-15 PROCEDURE — 3074F SYST BP LT 130 MM HG: CPT | Mod: CPTII,,,

## 2022-08-15 PROCEDURE — 1159F MED LIST DOCD IN RCRD: CPT | Mod: CPTII,,,

## 2022-08-15 PROCEDURE — 80183 DRUG SCRN QUANT OXCARBAZEPIN: CPT

## 2022-08-15 PROCEDURE — 3008F BODY MASS INDEX DOCD: CPT | Mod: CPTII,,,

## 2022-08-15 PROCEDURE — 99215 OFFICE O/P EST HI 40 MIN: CPT | Mod: S$PBB,,,

## 2022-08-15 PROCEDURE — 99999 PR PBB SHADOW E&M-EST. PATIENT-LVL III: ICD-10-PCS | Mod: PBBFAC,,,

## 2022-08-15 PROCEDURE — 4010F ACE/ARB THERAPY RXD/TAKEN: CPT | Mod: CPTII,,,

## 2022-08-15 PROCEDURE — 99215 PR OFFICE/OUTPT VISIT, EST, LEVL V, 40-54 MIN: ICD-10-PCS | Mod: S$PBB,,,

## 2022-08-15 RX ORDER — OXCARBAZEPINE 60 MG/ML
600 SUSPENSION ORAL 2 TIMES DAILY
Qty: 1800 ML | Refills: 3 | Status: SHIPPED | OUTPATIENT
Start: 2022-08-15 | End: 2023-09-18 | Stop reason: SDUPTHER

## 2022-08-15 RX ORDER — LEVETIRACETAM 100 MG/ML
1000 SOLUTION ORAL 2 TIMES DAILY
Qty: 1800 ML | Refills: 3 | Status: SHIPPED | OUTPATIENT
Start: 2022-08-15 | End: 2022-11-22

## 2022-08-15 RX ORDER — SERTRALINE HYDROCHLORIDE 50 MG/1
50 TABLET, FILM COATED ORAL DAILY
Qty: 90 TABLET | Refills: 3 | Status: SHIPPED | OUTPATIENT
Start: 2022-08-15 | End: 2022-11-22

## 2022-08-15 RX ORDER — FOLIC ACID 1 MG/1
1 TABLET ORAL DAILY
Qty: 90 TABLET | Refills: 3 | Status: SHIPPED | OUTPATIENT
Start: 2022-08-15 | End: 2023-08-14 | Stop reason: SDUPTHER

## 2022-08-15 NOTE — PATIENT INSTRUCTIONS
You came to Epilepsy Clinic because of your seizure disorder. Your seizures are well controlled on keppra 1000mg twice daily, perampanel 8mg at night, and oxcarbazepine 600mg twice daily. Please continue these medications at these doses.     Do not miss any doses of medication. If a dose of medication is missed, take it as soon as it is remembered even if that means doubling up on the dose. Please get a lab test to check out the blood level of medication. Take folic acid 1mg daily. Get regular sleep. Go to sleep at the same time and wake up at the same time every day. People with epilepsy require more sleep than people without epilepsy.  Sleeping 10-12 hours a day can be normal for a person with epilepsy.  Every seizure makes it harder to prevent the next seizure. Epilepsy is associated with SUDEP, or sudden unexpected death in epilepsy.  The risk is significantly higher if convulsive seizures are not well controlled. For more information, check out these websites: https://www.epilepsyallianceamerica.org/, www.donna-epilepsy.org, www.womenandepilepsy.org.      You are anxious. I would like you to try a new medication called sertraline. Please take 1/2 tablet for 14 days then increase to 1 tablet daily. You can take this medication in the morning or at night, with or without food. You will have some side effects as you start this medication like GI upset, changes in appetite, changes in sleep, possibly more anxiety. Please push through, your body will adjust.  Please do a self inventory in two months to see how you are doing with the medication and give me an update through the portal at that time.    I also put in a referral to our epilepsy  Alejandro who is also a licensed therapist. He will reach out over the portal to schedule a virtual visit. I think he will be a good person to talk to and process the emotions you are feeling.      Per [place] law, no episodes of loss of consciousness for 6 months  before driving.  Avoid dangerous situations.  For example, no baths/pools alone, no heights, no power tools.  Wear a bike helmet.  If breakthrough seizures occur that are different in character, frequency, or duration from normal episodes, please patient portal me or call the office and we will decide the next steps. If multiple seizures occur in a row without return back to baseline, 911 needs to be called.     Return to clinic in 3 months or sooner with issues.  Please patient portal with any questions or concerns.    Stefania Franz PA-C   Neurology-Epilepsy  [facility]

## 2022-08-15 NOTE — PROGRESS NOTES
Name: Stephany Cohn  MRN:[id number]   CSN: [id number]  Date of service: 8/15/2022  Age:22 y.o.   Gender:female   Referring Physician/Service: No referring provider defined for this encounter.   The patient is here today with:  kasia Goldberg     Neurology Clinic: Follow Up Visit    CHIEF COMPLAINT:  Epilepsy    Interval Events/ROS 8/15/2022:    Accompanied by mom who also contributes to the history. One breakthrough event 7/6/2022 in the setting of missed medication/trouble obtaining refill from pharmacy. Current ASM regimen: levetiracetam 1000 mg twice daily, oxcarbazepine 600 mg twice daily, and perampanel 8 mg. Folic acid 1 mg daily. Prior to this, last seizure was 11/2021 after shunt revision. Gradually worsening agitation/irritability. Patient notes experiencing anxiety and depression. Passive thoughts of harming herself, no plan. No active SI or HI. Sleeping okay, has intermittent difficulty falling and staying asleep. Otherwise, no fever, no cold symptoms, no current headache, no changes in vision, no new weakness, no chest pain, no shortness of breath, no nausea, no vomiting, no diarrhea, no constipation, no tingling/numbness.    HPI 8/18/2021:     Age of first seizure:  First seizure was a febrile seizure as an infant, <1yo   Seizure Risk Factors:  Cerebral palsy, hydrocephalus,  shunt, no family history of seizures, no head strike with LOC, during birth -> ?brain bleed, premie at 1lb 7oz, csection for unclear reasons, was in incubator ?intubation, stayed a couple of months in the hospital before being able to go home (needed to be >5lb), initial shunt placed at 1yo with infection, revision at 1yo, no other CNS infections    Time of Last Seizure: 8/14/2021 (and then again walking out of clinic)  # of lifetime Seizures:  ?30 lifetime seizures   Frequency of Seizures: at most, 2 in one day, currently about 3-4 per year, seizure free for a couple of years seizure restarted when weaning off keppra   Seizure  Triggers: decreased meds, poor sleep   Injuries/Hospitalization for seizures?  No injuries, when seizures started ED x5, then not as frequently because would use Diastat, 3 years since going to the ED for seizures  Pregnancy? Not sexually active, not interested in pregnancy   Contraception? no  Folic Acid? No   Bone Health:  No dexa      Auras: will sit down and tell mom that she is about to have a seizure     Seizure Events:   1. Previously, before this medication combo, start with left arm/leg jerking, eye darting to the side, able to talk through seizures, will have a hard time with vision, say she can not see, exhausted afterwards, no urinary incontinence, no tongue bite   2. Currently, comes out of nowhere, eyes go to back of head, left arm/leg twitching, no loss of consciousness, tired afterwards      Current AED/SEs:  1. Levetiracetam: solution currently -> 400mg/400mg/500mg at night   2. Oxcarbazepine 600mg twice daily SE none   3. Perampanel 4mg nightly   4. Rectal diazepam -> last used about a year ago     Previous AED/SEs or reason for DC.   1. Mom knows she has been on lots of medications but does not remember which ones      EEG: in the past -> results unclear     CT/MRI: no images currently. Not in at least 4 years.     AED compliance, adherence: no missed medications       ROS 8/18/2021:  Left spastic hemiparesis, cerebral palsy, hydrocephalus.  Frequent headaches. Sometimes will say shunt is hurting. Nauseated. Just moved back from [place] -> moved there after [place], just moved back. Will get snappy and mean when she feels bad. No migraines in the family. Frontal and posterior headaches with photophobia, vertigo, N/V. Does not seem positional. N/V currently today, + headache, double vision with vomiting, limp, left side jerking, rare trips over the left side, usually wears a brace on left home, sit at home, watches TV. 10 headaches in the last month.  Otherwise denies poor vision at this time,  "no language difficulties, no difficulties with math, no cough or shortness of breath.  No chest pain.  No diarrhea or constipation.      EXAM:   - Vitals: /81   Pulse 87   Ht 4' 11" (1.499 m)   Wt 46.2 kg (101 lb 13.6 oz)   BMI 20.57 kg/m²    - General: Awake, appears unwell, vomits in a vomit bag during clinic which is apparently her baseline. --> resolved since shunt revision   - HEENT: NC/AT  - Neck:  Decreased range of motion  - Pulmonary: no increased WOB  - Cardiac: well perfused   - Abdomen: soft, nontender, nondistended  - Extremities:  Left arm/hand atrophic  - Skin: no rashes or lesions noted.     NEURO EXAM:   - Mental Status: Awake, alert, oriented x 3. Looks to mom for most details of the history but does provide some answers that mom does not know.  Language is simple words and phrases with intact repetition and comprehension.  Somewhat hesitant prosody. There were no paraphasic errors. Speech was not dysarthric. Able to follow both midline and appendicular commands.     - Cranial Nerves:  Left field  Some left visual neglect, possibly a field cut. EOMI.  Activates somewhat faster on the right. Hearing intact to room voice. 5/5 strength in trapezii.  Does not lift up left shoulder. Tongue protrudes in midline and to either side with no evidence of atrophy or weakness.    - Motor:  Decreased bulk with increased left-sided tone.  Left pronation.     Delt Bic Tri WrE WrF  FFl FE IO IP Quad Ham TA Gastroc    R   5     5    5    5    5        5   5    5   5    5        5     5      5          L   4      5    4   3    5-        5    4   2    4    5       5     4      5         - Sensory: No deficits to light touch. No extinction to DSS.  - Coordination: No dysmetria on FNF  - Gait:  Hemiparetic gait with left limp, good balance     PLAN:   22-year-old woman born extremely premature with ? IPH with cerebral palsy, hydrocephalus requiring a  shunt, and subsequent development of epilepsy. " Previously several seizures a year on levetiracetam 400/400/500, oxcarbazepine 600 twice daily, and perampanel 4 --> transitioned to levetiracetam 1000 mg twice daily, oxcarbazepine 600 mg twice daily, and perampanel 8 mg with one breakthrough event in the setting of missed medication/trouble obtaining refill from pharmacy. Levels today. Folic acid 1 mg daily. Valtoco rescue. Trial of sertraline 50mg daily for mood. Social work referral. Also has intractable migraines since high school failing NSAIDs/acetaminophen/anticonvulsants. Previous stroke is a contraindication to Triptans. Trial with galcanezumab. Follow up in about 3 months (around 11/15/2022).     Patient Instructions   You came to Epilepsy Clinic because of your seizure disorder. Your seizures are well controlled on keppra 1000mg twice daily, perampanel 8mg at night, and oxcarbazepine 600mg twice daily. Please continue these medications at these doses.     Do not miss any doses of medication. If a dose of medication is missed, take it as soon as it is remembered even if that means doubling up on the dose. Please get a lab test to check out the blood level of medication. Take folic acid 1mg daily. Get regular sleep. Go to sleep at the same time and wake up at the same time every day. People with epilepsy require more sleep than people without epilepsy.  Sleeping 10-12 hours a day can be normal for a person with epilepsy.  Every seizure makes it harder to prevent the next seizure. Epilepsy is associated with SUDEP, or sudden unexpected death in epilepsy.  The risk is significantly higher if convulsive seizures are not well controlled. For more information, check out these websites: https://www.epilepsyallianceamerica.org/, www.donna-epilepsy.org, www.womenandepilepsy.org.      You are anxious. I would like you to try a new medication called sertraline. Please take 1/2 tablet for 14 days then increase to 1 tablet daily. You can take this medication in the  morning or at night, with or without food. You will have some side effects as you start this medication like GI upset, changes in appetite, changes in sleep, possibly more anxiety. Please push through, your body will adjust.  Please do a self inventory in two months to see how you are doing with the medication and give me an update through the portal at that time.    I also put in a referral to our epilepsy  Alejandro who is also a licensed therapist. He will reach out over the portal to schedule a virtual visit. I think he will be a good person to talk to and process the emotions you are feeling.      Per [place] law, no episodes of loss of consciousness for 6 months before driving.  Avoid dangerous situations.  For example, no baths/pools alone, no heights, no power tools.  Wear a bike helmet.  If breakthrough seizures occur that are different in character, frequency, or duration from normal episodes, please patient portal me or call the office and we will decide the next steps. If multiple seizures occur in a row without return back to baseline, 911 needs to be called.     Return to clinic in 3 months or sooner with issues.  Please patient portal with any questions or concerns.    Stefania Franz PA-C   Neurology-Epilepsy  [facility]       Problem List Items Addressed This Visit        Neuro    Localization-related (focal) (partial) symptomatic epilepsy and epileptic syndromes with complex partial seizures, intractable, without status epilepticus - Primary    Relevant Orders    Ambulatory referral/consult to Social Work    Levetiracetam level    Oxcarbazepine level    Misc Sendout Test, Blood 8773 PERAMPANEL (FYCOMPA) QUANT      Other Visit Diagnoses     Mood disorder due to medical condition        Relevant Medications    sertraline (ZOLOFT) 50 MG tablet    Other Relevant Orders    Ambulatory referral/consult to Social Work        Disclaimer: This note has been generated using  voice-recognition software. There may be typographical errors that were missed during proof-reading.     LABS:  Recent Labs   Lab 11/20/21  0900 11/20/21  0901 11/24/21  0809   WBC 10.76  --  10.13   Hemoglobin 10.1 L  --  10.6 L   Hematocrit 31.3 L  --  32.9 L   Platelets 355  --  325   Sodium  --  141 137   Potassium  --  3.7 3.5   BUN  --  10 11   Creatinine  --  0.7 0.8   eGFR if African American  --  >60.0 >60.0   eGFR if non African American  --  >60.0 >60.0       Recent Labs   Lab 08/18/21  1625   Levetiracetam Lvl 9.8   Oxcarbazepine 27        IMAGING:  Recent imaging is personally reviewed with the patient.    None in the system    PAST MEDICAL HISTORY:   Active Ambulatory Problems     Diagnosis Date Noted    Cerebral palsy     Hydrocephalus     Epilepsy     Left spastic hemiparesis     Localization-related (focal) (partial) symptomatic epilepsy and epileptic syndromes with complex partial seizures, intractable, without status epilepticus 08/18/2021    Intractable migraine with aura with status migrainosus 08/18/2021    S/P  shunt 08/18/2021    Decreased strength, endurance, and mobility 11/29/2021    Gait abnormality 11/29/2021    Balance problems 11/29/2021    Hypertension, essential, benign 11/30/2021    Left hand weakness 12/07/2021     Resolved Ambulatory Problems     Diagnosis Date Noted    No Resolved Ambulatory Problems     No Additional Past Medical History        PAST SURGICAL HISTORY:   Past Surgical History:   Procedure Laterality Date    REVISION OF VENTRICULOPERITONEAL SHUNT  2005    REVISION OF VENTRICULOPERITONEAL SHUNT Right 11/19/2021    Procedure: REVISION, SHUNT, VENTRICULOPERITONEAL;  Surgeon: Lien Arora MD;  Location: [facility] OR [address] [place] [address];  Service: Neurosurgery;  Laterality: Right;  proximal    REVISION OF VENTRICULOPERITONEAL SHUNT Right 11/23/2021    Procedure: REVISION, SHUNT, VENTRICULOPERITONEAL;  Surgeon: Lien Arora MD;  Location: [facility] OR [address] [place] [address];   Service: Neurosurgery;  Laterality: Right;    VENTRICULOPERITONEAL SHUNT Right     approx when pt. was 2 y/o then revised in jan. 2005        ALLERGIES: Sulfur and Sulfa (sulfonamide antibiotics)   CURRENT MEDICATIONS:   Current Outpatient Medications   Medication Sig Dispense Refill    diazePAM (DIASTAT) 2.5 mg Kit Place 10 mg rectally as needed (for seizures).      diazePAM (VALTOCO) 10 mg/spray (0.1 mL) Spry 1 spray by Nasal route as needed (for a seizure lasting more than 3 minutes or more than 3 seizures in an hour). 2 each 5    folic acid (FOLVITE) 1 MG tablet Take 1 tablet (1 mg total) by mouth once daily. 90 tablet 3    galcanezumab-gnlm 120 mg/mL Syrg Inject 120 mg into the skin every 28 days. maintenance dose 1 Syringe 6    levETIRAcetam (KEPPRA) 100 mg/mL Soln Take 10 mLs (1,000 mg total) by mouth 2 (two) times daily. 1800 mL 3    lisinopriL 10 MG tablet Take 1 tablet (10 mg total) by mouth once daily. 90 tablet 3    OXcarbazepine (TRILEPTAL) 300 mg/5 mL (60 mg/mL) Susp Take 10 mLs (600 mg total) by mouth 2 (two) times daily. 1800 mL 3    perampaneL (FYCOMPA) 8 mg tablet Take 1 tablet (8 mg total) by mouth every evening. 30 tablet 5    vitamin D (VITAMIN D3) 1000 units Tab Take 4,000 Units by mouth once daily.       No current facility-administered medications for this visit.        FAMILY HISTORY:   Family History   Problem Relation Age of Onset    Hypertension Mother     Other Mother         HPV    Arthritis Mother     Hypertension Father     Hypertension Brother     Cancer Maternal Aunt          SOCIAL HISTORY:   Social History     Socioeconomic History    Marital status: Single   Tobacco Use    Smoking status: Never Smoker    Smokeless tobacco: Never Used   Substance and Sexual Activity    Alcohol use: Never    Drug use: Never    Sexual activity: Never         Questions and concerns raised by the patient and family/care-giver(s) were addressed and they indicated understanding of  everything discussed and agreed to plans as above.    Stefania Franz PA-C   Neurology-Epilepsy  [facility]    Collaborating physician, Dr. Galilea Berger, was available during today's encounter.     I spent approximately 60 minutes on the day of this encounter preparing to see the patient, obtaining and reviewing history and results, performing a medically appropriate exam, counseling and educating the patient/family/caregiver, documenting clinical information, coordinating care, and ordering medications, tests, procedures, and referrals.

## 2022-08-17 LAB — OXCARBAZEPINE METABOLITE: 27 MCG/ML (ref 10–35)

## 2022-08-18 LAB — LEVETIRACETAM SERPL-MCNC: 40.2 UG/ML (ref 3–60)

## 2022-08-24 LAB — PERAMPANEL SERPL-MCNC: 430 NG/ML

## 2022-09-27 ENCOUNTER — PATIENT MESSAGE (OUTPATIENT)
Dept: NEUROLOGY | Facility: CLINIC | Age: 22
End: 2022-09-27
Payer: MEDICAID

## 2022-09-29 ENCOUNTER — PATIENT MESSAGE (OUTPATIENT)
Dept: NEUROLOGY | Facility: CLINIC | Age: 22
End: 2022-09-29
Payer: MEDICAID

## 2022-09-30 ENCOUNTER — TELEPHONE (OUTPATIENT)
Dept: NEUROLOGY | Facility: CLINIC | Age: 22
End: 2022-09-30
Payer: MEDICAID

## 2022-09-30 NOTE — TELEPHONE ENCOUNTER
JOJO placed phone call to patient [phone] and left VM. JOJO introduced himself as a therapist and  that supports patient's with epilepsy and neurological symptoms at the hospital.     JOJO reported he works closely with the epileptologists and neurologists such as Krystle Franz.     Requested response in portal to previous message or call back to JOJO.

## 2022-10-13 ENCOUNTER — TELEPHONE (OUTPATIENT)
Dept: NEUROLOGY | Facility: CLINIC | Age: 22
End: 2022-10-13
Payer: MEDICAID

## 2022-10-13 NOTE — TELEPHONE ENCOUNTER
JOJO placed phone call to patient [phone].  SW introduced himself as a therapist and  that supports patient's with epilepsy and neurological symptoms at the hospital.     Virtual Visit scheduled for 10/19/2022 at 2:00pm.

## 2022-10-19 ENCOUNTER — OFFICE VISIT (OUTPATIENT)
Dept: NEUROLOGY | Facility: CLINIC | Age: 22
End: 2022-10-19
Payer: MEDICAID

## 2022-10-19 DIAGNOSIS — F06.30 MOOD DISORDER DUE TO MEDICAL CONDITION: ICD-10-CM

## 2022-10-19 DIAGNOSIS — G40.219 LOCALIZATION-RELATED (FOCAL) (PARTIAL) SYMPTOMATIC EPILEPSY AND EPILEPTIC SYNDROMES WITH COMPLEX PARTIAL SEIZURES, INTRACTABLE, WITHOUT STATUS EPILEPTICUS: ICD-10-CM

## 2022-10-19 PROCEDURE — 4010F ACE/ARB THERAPY RXD/TAKEN: CPT | Mod: CPTII,95,, | Performed by: SOCIAL WORKER

## 2022-10-19 PROCEDURE — 90791 PSYCH DIAGNOSTIC EVALUATION: CPT | Mod: 95,,, | Performed by: SOCIAL WORKER

## 2022-10-19 PROCEDURE — 4010F PR ACE/ARB THEARPY RXD/TAKEN: ICD-10-PCS | Mod: CPTII,95,, | Performed by: SOCIAL WORKER

## 2022-10-19 PROCEDURE — 90791 PR PSYCHIATRIC DIAGNOSTIC EVALUATION: ICD-10-PCS | Mod: 95,,, | Performed by: SOCIAL WORKER
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
## 2022-11-01 ENCOUNTER — TELEPHONE (OUTPATIENT)
Dept: NEUROLOGY | Facility: CLINIC | Age: 22
End: 2022-11-01
Payer: MEDICAID

## 2022-11-01 NOTE — TELEPHONE ENCOUNTER
SW logged on to scheduled visit for 10 minutes and patient did not log on.     SW placed call to patient and left VM requesting call back.

## 2022-11-22 ENCOUNTER — OFFICE VISIT (OUTPATIENT)
Dept: NEUROLOGY | Facility: CLINIC | Age: 22
End: 2022-11-22
Payer: MEDICAID

## 2022-11-22 VITALS
DIASTOLIC BLOOD PRESSURE: 81 MMHG | HEIGHT: 59 IN | WEIGHT: 99.75 LBS | HEART RATE: 68 BPM | BODY MASS INDEX: 20.11 KG/M2 | SYSTOLIC BLOOD PRESSURE: 120 MMHG

## 2022-11-22 DIAGNOSIS — G40.219 LOCALIZATION-RELATED (FOCAL) (PARTIAL) SYMPTOMATIC EPILEPSY AND EPILEPTIC SYNDROMES WITH COMPLEX PARTIAL SEIZURES, INTRACTABLE, WITHOUT STATUS EPILEPTICUS: Primary | ICD-10-CM

## 2022-11-22 DIAGNOSIS — F06.30 MOOD DISORDER DUE TO MEDICAL CONDITION: ICD-10-CM

## 2022-11-22 PROCEDURE — 1159F MED LIST DOCD IN RCRD: CPT | Mod: CPTII,,,

## 2022-11-22 PROCEDURE — 99215 PR OFFICE/OUTPT VISIT, EST, LEVL V, 40-54 MIN: ICD-10-PCS | Mod: S$PBB,,,

## 2022-11-22 PROCEDURE — 99999 PR PBB SHADOW E&M-EST. PATIENT-LVL III: CPT | Mod: PBBFAC,,,

## 2022-11-22 PROCEDURE — 4010F PR ACE/ARB THEARPY RXD/TAKEN: ICD-10-PCS | Mod: CPTII,,,

## 2022-11-22 PROCEDURE — 99215 OFFICE O/P EST HI 40 MIN: CPT | Mod: S$PBB,,,

## 2022-11-22 PROCEDURE — 3079F PR MOST RECENT DIASTOLIC BLOOD PRESSURE 80-89 MM HG: ICD-10-PCS | Mod: CPTII,,,

## 2022-11-22 PROCEDURE — 99999 PR PBB SHADOW E&M-EST. PATIENT-LVL III: ICD-10-PCS | Mod: PBBFAC,,,

## 2022-11-22 PROCEDURE — 1159F PR MEDICATION LIST DOCUMENTED IN MEDICAL RECORD: ICD-10-PCS | Mod: CPTII,,,

## 2022-11-22 PROCEDURE — 3074F SYST BP LT 130 MM HG: CPT | Mod: CPTII,,,

## 2022-11-22 PROCEDURE — 3008F PR BODY MASS INDEX (BMI) DOCUMENTED: ICD-10-PCS | Mod: CPTII,,,

## 2022-11-22 PROCEDURE — 3074F PR MOST RECENT SYSTOLIC BLOOD PRESSURE < 130 MM HG: ICD-10-PCS | Mod: CPTII,,,

## 2022-11-22 PROCEDURE — 99213 OFFICE O/P EST LOW 20 MIN: CPT | Mod: PBBFAC

## 2022-11-22 PROCEDURE — 4010F ACE/ARB THERAPY RXD/TAKEN: CPT | Mod: CPTII,,,

## 2022-11-22 PROCEDURE — 3079F DIAST BP 80-89 MM HG: CPT | Mod: CPTII,,,

## 2022-11-22 PROCEDURE — 3008F BODY MASS INDEX DOCD: CPT | Mod: CPTII,,,

## 2022-11-22 RX ORDER — SERTRALINE HYDROCHLORIDE 100 MG/1
100 TABLET, FILM COATED ORAL DAILY
Qty: 30 TABLET | Refills: 11 | Status: SHIPPED | OUTPATIENT
Start: 2022-11-22 | End: 2023-12-14 | Stop reason: SDUPTHER

## 2022-11-22 RX ORDER — LACOSAMIDE 10 MG/ML
200 SOLUTION ORAL EVERY 12 HOURS
Qty: 1200 ML | Refills: 5 | Status: SHIPPED | OUTPATIENT
Start: 2022-11-22 | End: 2022-12-12

## 2022-11-22 NOTE — PROGRESS NOTES
"Name: Stephany Cohn  MRN:[id number]   CSN: [id number]  Date of service: 11/22/2022  Age:22 y.o.   Gender:female   Referring Physician/Service: No referring provider defined for this encounter.   The patient is here today with: Yusuf Roger     Neurology Clinic: Follow Up Visit    CHIEF COMPLAINT:  Epilepsy    Interval Events/ROS 11/22/2022:    Current AED/SEs: Levetiracetam 1000mg BID (10 mL BID), Ocxarbazepine 600mg BID (10 mL BID), Parampanel 8mg qhs   Breakthrough seizures/events: yes, one breakthrough event 11/12/2022. No other seizures noted by patient.  Driving: no  Folic acid: yes  Sleep: 7-8 hours per night.   Mood: "Weird". Endorses mood is not the best currently.     Endorses "white floaters" in vision intermittently s/p event in November. Otherwise, no fever, no cold symptoms, no headache, no new weakness, no chest pain, no shortness of breath, no tingling/numbness, and no problems walking.     Recent Labs   Lab 08/18/21  1625 08/15/22  1100   Levetiracetam Lvl 9.8 40.2   Oxcarbazepine 27 27   Perampanel (Fycompa) Quant  --  430     Interval Events/ROS 8/15/2022:    Accompanied by mom who also contributes to the history. One breakthrough event 7/6/2022 in the setting of missed medication/trouble obtaining refill from pharmacy. Current ASM regimen: levetiracetam 1000 mg twice daily, oxcarbazepine 600 mg twice daily, and perampanel 8 mg. Folic acid 1 mg daily. Prior to this, last seizure was 11/2021 after shunt revision. Gradually worsening agitation/irritability. Patient notes experiencing anxiety and depression. Passive thoughts of harming herself, no plan. No active SI or HI. Sleeping okay, has intermittent difficulty falling and staying asleep. Otherwise, no fever, no cold symptoms, no current headache, no changes in vision, no new weakness, no chest pain, no shortness of breath, no nausea, no vomiting, no diarrhea, no constipation, no tingling/numbness.    HPI 8/18/2021:     Age of first seizure:  " First seizure was a febrile seizure as an infant, <3yo   Seizure Risk Factors:  Cerebral palsy, hydrocephalus,  shunt, no family history of seizures, no head strike with LOC, during birth -> ?brain bleed, premie at 1lb 7oz, csection for unclear reasons, was in incubator ?intubation, stayed a couple of months in the hospital before being able to go home (needed to be >5lb), initial shunt placed at 2yo with infection, revision at 3yo, no other CNS infections    Time of Last Seizure: 8/14/2021 (and then again walking out of clinic)  # of lifetime Seizures:  ?30 lifetime seizures   Frequency of Seizures: at most, 2 in one day, currently about 3-4 per year, seizure free for a couple of years seizure restarted when weaning off keppra   Seizure Triggers: decreased meds, poor sleep   Injuries/Hospitalization for seizures?  No injuries, when seizures started ED x5, then not as frequently because would use Diastat, 3 years since going to the ED for seizures  Pregnancy? Not sexually active, not interested in pregnancy   Contraception? no  Folic Acid? No   Bone Health:  No dexa      Auras: will sit down and tell mom that she is about to have a seizure     Seizure Events:   1. Previously, before this medication combo, start with left arm/leg jerking, eye darting to the side, able to talk through seizures, will have a hard time with vision, say she can not see, exhausted afterwards, no urinary incontinence, no tongue bite   2. Currently, comes out of nowhere, eyes go to back of head, left arm/leg twitching, no loss of consciousness, tired afterwards      Current AED/SEs:  1. Levetiracetam: solution currently -> 400mg/400mg/500mg at night   2. Oxcarbazepine 600mg twice daily SE none   3. Perampanel 4mg nightly   4. Rectal diazepam -> last used about a year ago     Previous AED/SEs or reason for DC.   1. Mom knows she has been on lots of medications but does not remember which ones      EEG: in the past -> results unclear  "    CT/MRI: no images currently. Not in at least 4 years.     AED compliance, adherence: no missed medications       ROS 8/18/2021:  Left spastic hemiparesis, cerebral palsy, hydrocephalus.  Frequent headaches. Sometimes will say shunt is hurting. Nauseated. Just moved back from [place] -> [place] there after [place], just moved back. Will get snappy and mean when she feels bad. No migraines in the family. Frontal and posterior headaches with photophobia, vertigo, N/V. Does not seem positional. N/V currently today, + headache, double vision with vomiting, limp, left side jerking, rare trips over the left side, usually wears a brace on left home, sit at home, watches TV. 10 headaches in the last month.  Otherwise denies poor vision at this time, no language difficulties, no difficulties with math, no cough or shortness of breath.  No chest pain.  No diarrhea or constipation.      EXAM:   - Vitals: /81   Pulse 68   Ht 4' 11" (1.499 m)   Wt 45.2 kg (99 lb 12.1 oz)   BMI 20.15 kg/m²    - General: Awake and alert, NAD  - HEENT: NC/AT  - Neck:  Decreased range of motion  - Pulmonary: no increased WOB  - Cardiac: well perfused   - Abdomen: soft, nontender, nondistended  - Extremities:  Left arm/hand atrophic  - Skin: no rashes or lesions noted.     NEURO EXAM:   - Mental Status: Awake, alert, oriented x 3. Looks to mom for most details of the history but does provide some answers that mom does not know.  Language is simple words and phrases with intact repetition and comprehension.  Somewhat hesitant prosody. There were no paraphasic errors. Speech was not dysarthric.     - Cranial Nerves:   EOMI.  Activates somewhat faster on the right. Hearing intact to room voice. 5/5 strength in trapezii.  Does not lift up left shoulder. Tongue protrudes in midline and to either side with no evidence of atrophy or weakness.    - Motor:  Decreased bulk with increased left-sided tone.  Left pronation.     Delt Bic Tri WrE WrF "  FFl FE IO IP Quad Ham TA Gastroc    R   5     5    5    5    5        5   5    5   5    5        5     5      5          L   4      5    4   3    5-        5    4   2    4    5       5     4      5         - Sensory: No deficits to light touch. No extinction to DSS.  - Coordination: No dysmetria on FNF  - Gait:  Hemiparetic gait with left limp, good balance     PLAN:   Stephany Cohn is a 22-year-old woman born extremely premature with ?  IPH with cerebral palsy, hydrocephalus requiring a  shunt, and subsequent development of epilepsy here in clinic today for a follow-up of seizure management. Patient partially-controlled on ASM regimen: Levetiracetam 1000mg BID (10 mL BID), Oxcarbazepine 600mg BID (10 mL BID), Parampanel 8mg once daily. Had one seizure in 2022 since her last visit. Since patient's seizures are partially controlled and endorsed mood issues, will begin outpatient cross taper from Levetiracetam 10mL BID to Lacosamide 200mg BID (20 mL BID). Cross-taper schedule provided to patient. Increased Setraline 50mg ->100mg to help with mood. Baseline EEG. Follow-up in three months or sooner with any issues. Patient and mother verbalize understanding and are amenable to the plan. No question or concerns at this time.     ALVA Gates  [facility]    Patient Instructions   You came to Epilepsy Clinic because of your seizure disorder. Your seizures are partially controlled on keppra 1000mg twice daily, oxcarbazepine 600mg twice daily, and perampanel 8mg at night.      To help with your seizures and your mood, we would like to transition you off the keppra and try a medication lacosamide (vimpat). Please follow the schedule below in order to safely transition medications:    Whenever you get the new medicine:   - start lacosamide 20mls in the morning and 20mls at night   - decrease keppra to 5mls in the morning and 5mls at night   - continue oxcarbazepine 10mls in the morning and  10mls at night    In one week:  - continue lacosamide 20mls in the morning and 20mls at night   - stop keppra    - continue oxcarbazepine 10mls in the morning and 10mls at night    Do not miss any doses of medication. If a dose of medication is missed, take it as soon as it is remembered even if that means doubling up on the dose. Take folic acid 1mg daily. Get regular sleep. Go to sleep at the same time and wake up at the same time every day. People with epilepsy require more sleep than people without epilepsy.  Sleeping 10-12 hours a day can be normal for a person with epilepsy.  Every seizure makes it harder to prevent the next seizure. Epilepsy is associated with SUDEP, or sudden unexpected death in epilepsy.  The risk is significantly higher if convulsive seizures are not well controlled. For more information, check out these websites: https://www.epilepsy.com/, https://www.epilepsyallianceamerica.org/, www.donna-epilepsy.org, www.womenandepilepsy.org.      They will call you to schedule the EEG.    Increase sertraline to 100mg daily.     Per [place] law, no episodes of loss of consciousness for 6 months before driving.  Avoid dangerous situations.  For example, no baths/pools alone, no heights, no power tools.  Wear a bike helmet.  If breakthrough seizures occur that are different in character, frequency, or duration from normal episodes, please patient portal me or call the office and we will decide the next steps. If multiple seizures occur in a row without return back to baseline, 911 needs to be called.     Return to clinic in 3 months or sooner with issues.  Please patient portal with any questions or concerns.    Stefania Franz PA-C   Neurology-Epilepsy  [facility]     Problem List Items Addressed This Visit          Neuro    Localization-related (focal) (partial) symptomatic epilepsy and epileptic syndromes with complex partial seizures, intractable, without status epilepticus -  Primary    Relevant Medications    lacosamide (VIMPAT) 10 mg/mL Soln oral solution    Other Relevant Orders    EEG,w/awake & asleep record     Other Visit Diagnoses       Mood disorder due to medical condition        Relevant Medications    sertraline (ZOLOFT) 100 MG tablet          Disclaimer: This note has been generated using voice-recognition software. There may be typographical errors that were missed during proof-reading.     LABS:  Recent Labs   Lab 11/20/21  0900 11/20/21  0901 11/24/21  0809   WBC 10.76  --  10.13   Hemoglobin 10.1 L  --  10.6 L   Hematocrit 31.3 L  --  32.9 L   Platelets 355  --  325   Sodium  --  141 137   Potassium  --  3.7 3.5   BUN  --  10 11   Creatinine  --  0.7 0.8   eGFR if African American  --  >60.0 >60.0   eGFR if non African American  --  >60.0 >60.0       Recent Labs   Lab 08/18/21  1625 08/15/22  1100   Levetiracetam Lvl 9.8 40.2   Oxcarbazepine 27 27   Perampanel (Fycompa) Quant  --  430        IMAGING:  Recent imaging is personally reviewed with the patient.    None in the system    PAST MEDICAL HISTORY:   Active Ambulatory Problems     Diagnosis Date Noted    Cerebral palsy     Hydrocephalus     Epilepsy     Left spastic hemiparesis     Localization-related (focal) (partial) symptomatic epilepsy and epileptic syndromes with complex partial seizures, intractable, without status epilepticus 08/18/2021    Intractable migraine with aura with status migrainosus 08/18/2021    S/P  shunt 08/18/2021    Decreased strength, endurance, and mobility 11/29/2021    Gait abnormality 11/29/2021    Balance problems 11/29/2021    Hypertension, essential, benign 11/30/2021    Left hand weakness 12/07/2021     Resolved Ambulatory Problems     Diagnosis Date Noted    No Resolved Ambulatory Problems     No Additional Past Medical History        PAST SURGICAL HISTORY:   Past Surgical History:   Procedure Laterality Date    REVISION OF VENTRICULOPERITONEAL SHUNT  2005    REVISION OF  VENTRICULOPERITONEAL SHUNT Right 11/19/2021    Procedure: REVISION, SHUNT, VENTRICULOPERITONEAL;  Surgeon: Lien Arora MD;  Location: [facility] OR 2ND FLR;  Service: Neurosurgery;  Laterality: Right;  proximal    REVISION OF VENTRICULOPERITONEAL SHUNT Right 11/23/2021    Procedure: REVISION, SHUNT, VENTRICULOPERITONEAL;  Surgeon: Lien Arora MD;  Location: [facility] OR 2ND FLR;  Service: Neurosurgery;  Laterality: Right;    VENTRICULOPERITONEAL SHUNT Right     approx when pt. was 2 y/o then revised in jan. 2005        ALLERGIES: Sulfur and Sulfa (sulfonamide antibiotics)   CURRENT MEDICATIONS:   Current Outpatient Medications   Medication Sig Dispense Refill    diazePAM (DIASTAT) 2.5 mg Kit Place 10 mg rectally as needed (for seizures).      diazePAM (VALTOCO) 10 mg/spray (0.1 mL) Spry 1 spray by Nasal route as needed (for a seizure lasting more than 3 minutes or more than 3 seizures in an hour). 2 each 5    folic acid (FOLVITE) 1 MG tablet Take 1 tablet (1 mg total) by mouth once daily. 90 tablet 3    lisinopriL 10 MG tablet Take 1 tablet (10 mg total) by mouth once daily. 90 tablet 3    OXcarbazepine (TRILEPTAL) 300 mg/5 mL (60 mg/mL) Susp Take 10 mLs (600 mg total) by mouth 2 (two) times daily. 1800 mL 3    perampaneL (FYCOMPA) 8 mg tablet Take 1 tablet (8 mg total) by mouth every evening. 30 tablet 5    vitamin D (VITAMIN D3) 1000 units Tab Take 4,000 Units by mouth once daily.      galcanezumab-gnlm 120 mg/mL Syrg Inject 120 mg into the skin every 28 days. maintenance dose (Patient not taking: Reported on 8/15/2022) 1 Syringe 6    lacosamide (VIMPAT) 10 mg/mL Soln oral solution Take 20 mLs (200 mg total) by mouth every 12 (twelve) hours. 1200 mL 5    sertraline (ZOLOFT) 100 MG tablet Take 1 tablet (100 mg total) by mouth once daily. 30 tablet 11     No current facility-administered medications for this visit.        FAMILY HISTORY:   Family History   Problem Relation Age of Onset    Hypertension Mother      Other Mother         HPV    Arthritis Mother     Hypertension Father     Hypertension Brother     Cancer Maternal Aunt          SOCIAL HISTORY:   Social History     Socioeconomic History    Marital status: Single   Tobacco Use    Smoking status: Never    Smokeless tobacco: Never   Substance and Sexual Activity    Alcohol use: Never    Drug use: Never    Sexual activity: Never      Questions and concerns raised by the patient and family/care-giver(s) were addressed and they indicated understanding of everything discussed and agreed to plans as above.    Stefania Franz PA-C   Neurology-Epilepsy  [facility]    Collaborating physician, Dr. Galilea Berger, was available during today's encounter.     I spent approximately 60 minutes on the day of this encounter preparing to see the patient, obtaining and reviewing history and results, performing a medically appropriate exam, counseling and educating the patient/family/caregiver, documenting clinical information, coordinating care, and ordering medications, tests, procedures, and referrals.

## 2022-11-22 NOTE — PATIENT INSTRUCTIONS
You came to Epilepsy Clinic because of your seizure disorder. Your seizures are partially controlled on keppra 1000mg twice daily, oxcarbazepine 600mg twice daily, and perampanel 8mg at night.      To help with your seizures and your mood, we would like to transition you off the keppra and try a medication lacosamide (vimpat). Please follow the schedule below in order to safely transition medications:    Whenever you get the new medicine:   - start lacosamide 20mls in the morning and 20mls at night   - decrease keppra to 5mls in the morning and 5mls at night   - continue oxcarbazepine 10mls in the morning and 10mls at night    In one week:  - continue lacosamide 20mls in the morning and 20mls at night   - stop keppra    - continue oxcarbazepine 10mls in the morning and 10mls at night    Do not miss any doses of medication. If a dose of medication is missed, take it as soon as it is remembered even if that means doubling up on the dose. Take folic acid 1mg daily. Get regular sleep. Go to sleep at the same time and wake up at the same time every day. People with epilepsy require more sleep than people without epilepsy.  Sleeping 10-12 hours a day can be normal for a person with epilepsy.  Every seizure makes it harder to prevent the next seizure. Epilepsy is associated with SUDEP, or sudden unexpected death in epilepsy.  The risk is significantly higher if convulsive seizures are not well controlled. For more information, check out these websites: https://www.epilepsy.com/, https://www.epilepsyallianceamerica.org/, www.donna-epilepsy.org, www.womenandepilepsy.org.      They will call you to schedule the EEG.    Increase sertraline to 100mg daily.     Per [place] law, no episodes of loss of consciousness for 6 months before driving.  Avoid dangerous situations.  For example, no baths/pools alone, no heights, no power tools.  Wear a bike helmet.  If breakthrough seizures occur that are different in character, frequency,  or duration from normal episodes, please patient portal me or call the office and we will decide the next steps. If multiple seizures occur in a row without return back to baseline, 911 needs to be called.     Return to clinic in 3 months or sooner with issues.  Please patient portal with any questions or concerns.    Stefania Franz PA-C   Neurology-Epilepsy  [facility]

## 2022-12-11 ENCOUNTER — PATIENT MESSAGE (OUTPATIENT)
Dept: NEUROLOGY | Facility: CLINIC | Age: 22
End: 2022-12-11
Payer: MEDICAID

## 2022-12-11 DIAGNOSIS — G40.219 LOCALIZATION-RELATED (FOCAL) (PARTIAL) SYMPTOMATIC EPILEPSY AND EPILEPTIC SYNDROMES WITH COMPLEX PARTIAL SEIZURES, INTRACTABLE, WITHOUT STATUS EPILEPTICUS: Primary | ICD-10-CM

## 2022-12-12 RX ORDER — LACOSAMIDE 200 MG/1
200 TABLET ORAL EVERY 12 HOURS
Qty: 60 TABLET | Refills: 5 | Status: SHIPPED | OUTPATIENT
Start: 2022-12-12 | End: 2024-01-22 | Stop reason: SDUPTHER

## 2022-12-13 NOTE — TELEPHONE ENCOUNTER
Unable to tolerate lacosamide oral solution.  Will do a trial with lacosamide 200 mg pills to see if she is able to tolerate the medication in pill form.    Galilea Berger MD PhD  Neurology-Epilepsy  [facility].

## 2022-12-14 ENCOUNTER — TELEPHONE (OUTPATIENT)
Dept: NEUROSURGERY | Facility: CLINIC | Age: 22
End: 2022-12-14
Payer: MEDICAID

## 2022-12-14 NOTE — TELEPHONE ENCOUNTER
Spoke w pt and scheduled f/u with Dr Arora 2/14/2023. MRI and SS at 2:00pm and f/u in clinic at 3:30pm.   noted

## 2022-12-19 PROCEDURE — 95816 PR EEG,W/AWAKE & DROWSY RECORD: ICD-10-PCS | Mod: 26,,, | Performed by: PSYCHIATRY & NEUROLOGY

## 2022-12-19 PROCEDURE — 95816 EEG AWAKE AND DROWSY: CPT | Mod: 26,,, | Performed by: PSYCHIATRY & NEUROLOGY

## 2022-12-20 ENCOUNTER — HOSPITAL ENCOUNTER (OUTPATIENT)
Dept: NEUROLOGY | Facility: HOSPITAL | Age: 22
Discharge: HOME | End: 2022-12-20
Payer: MEDICAID

## 2022-12-20 DIAGNOSIS — G40.219 LOCALIZATION-RELATED (FOCAL) (PARTIAL) SYMPTOMATIC EPILEPSY AND EPILEPTIC SYNDROMES WITH COMPLEX PARTIAL SEIZURES, INTRACTABLE, WITHOUT STATUS EPILEPTICUS: ICD-10-CM

## 2022-12-20 NOTE — PROCEDURES
EEG REPORT      Stephany Cohn  [id number]  2000    DATE OF SERVICE: 12/19/2022         METHODOLOGY      Extended electroencephalographic recording is made while the patient is ambulatory and continuing normal daily activities.  Electrodes are placed according to the International 10-20 placement system and included T1 and T2 electrode placement.  Twenty four (24) channels of digital signal (sampling rate of 512/sec) was simultaneously recorded from the scalp including EKG and eye monitors.  Recording band pass was 0.1 to 100 hz and all data was stored digitally on the recorder.  The patient is instructed to press an event button when clinical symptoms occur and write the symptoms into a diary. Activation procedures which include photic stimulation, hyperventilation and instructing patients to perform simple task are done in selected patients.        The EEG is displayed on a monitor screen and can be reformatted into different montages for evaluation.  The entire recoding is submitted for computer assisted analysis to detect spike and electrographic seizure activity.  The entire recording is visually reviewed and the times identified by computer analysis as being spikes or seizures are reviewed again.  Compresses spectral analysis (CSA) is also performed on the activity recorded from each individual channel.  This is displayed as a power display of frequencies from 0 to 30 Hz over time.   The CSA analysis is done and displayed continuously.  This is reviewed for asymmetries in power between homologous areas of the scalp and for presence of changes in power which canbe seen when seizures occur.  Sections of suspected abnormalities on the CSA is then compared with the original EEG recording.   [employer] software was also utilized in the review of this study.  This software suite analyzes the EEG recording in multiple domains.  Coherence and rhythmicity is computed to identify EEG sections which may contain  organized seizures.  Each channel undergoes analysis to detect presence of spike and sharp waves which have special and morphological characteristic of epileptic activity.  The routine EEG recording is converted from spacial into frequency domain.  This is then displayed comparing homologous areas to identify areas of significant asymmetry.  Algorithm to identify non-cortically generated artifact is used to separate eye movement, EMG and other artifact from the EEG     Recording Times    A total of 00:32:03 hours of EEG was recorded.      EEG FINDINGS:  Background activity:   The background rhythm was characterized by alpha and anterior dominant beta activity with a 9-10Hz posterior dominant alpha rhythm at 30-70 microvolts.   Symmetry and continuity: the background was continuous and symmetric     Sleep:   No sleep transients were seen.    Activation procedures:   Photic stimulation was performed with no abnormalities seen  Hyperventilation was performed with no abnormalities seen    Abnormal activity:   No epileptiform discharges, periodic discharges, lateralized rhythmic delta activity or electrographic seizures were seen.    IMPRESSION:   Normal EEG of the waking state.      Miguel Manzo MD  Neurology-Epilepsy.  [facility].

## 2023-01-09 ENCOUNTER — PATIENT MESSAGE (OUTPATIENT)
Dept: NEUROLOGY | Facility: CLINIC | Age: 23
End: 2023-01-09
Payer: MEDICAID

## 2023-01-09 ENCOUNTER — HOSPITAL ENCOUNTER (EMERGENCY)
Facility: HOSPITAL | Age: 23
Discharge: HOME | End: 2023-01-09
Attending: EMERGENCY MEDICINE
Payer: MEDICAID

## 2023-01-09 VITALS
SYSTOLIC BLOOD PRESSURE: 127 MMHG | BODY MASS INDEX: 20.56 KG/M2 | RESPIRATION RATE: 17 BRPM | TEMPERATURE: 100 F | HEIGHT: 59 IN | WEIGHT: 102 LBS | OXYGEN SATURATION: 100 % | HEART RATE: 88 BPM | DIASTOLIC BLOOD PRESSURE: 81 MMHG

## 2023-01-09 DIAGNOSIS — R56.9 OBSERVED SEIZURE-LIKE ACTIVITY: ICD-10-CM

## 2023-01-09 DIAGNOSIS — R56.9 SEIZURE: Primary | ICD-10-CM

## 2023-01-09 DIAGNOSIS — G40.211 LOCALIZATION-RELATED (FOCAL) (PARTIAL) SYMPTOMATIC EPILEPSY AND EPILEPTIC SYNDROMES WITH COMPLEX PARTIAL SEIZURES, INTRACTABLE, WITH STATUS EPILEPTICUS: Primary | ICD-10-CM

## 2023-01-09 LAB
ALBUMIN SERPL BCP-MCNC: 3.9 G/DL (ref 3.5–5.2)
ALP SERPL-CCNC: 61 U/L (ref 55–135)
ALT SERPL W/O P-5'-P-CCNC: 10 U/L (ref 10–44)
ANION GAP SERPL CALC-SCNC: 9 MMOL/L (ref 8–16)
AST SERPL-CCNC: 12 U/L (ref 10–40)
B-HCG UR QL: NEGATIVE
BASOPHILS # BLD AUTO: 0.03 K/UL (ref 0–0.2)
BASOPHILS NFR BLD: 0.3 % (ref 0–1.9)
BILIRUB SERPL-MCNC: 0.1 MG/DL (ref 0.1–1)
BILIRUB UR QL STRIP: NEGATIVE
BUN SERPL-MCNC: 8 MG/DL (ref 6–20)
CALCIUM SERPL-MCNC: 8.8 MG/DL (ref 8.7–10.5)
CHLORIDE SERPL-SCNC: 111 MMOL/L (ref 95–110)
CLARITY UR: CLEAR
CO2 SERPL-SCNC: 23 MMOL/L (ref 23–29)
COLOR UR: YELLOW
CREAT SERPL-MCNC: 0.8 MG/DL (ref 0.5–1.4)
CTP QC/QA: YES
DIFFERENTIAL METHOD: ABNORMAL
EOSINOPHIL # BLD AUTO: 0 K/UL (ref 0–0.5)
EOSINOPHIL NFR BLD: 0 % (ref 0–8)
ERYTHROCYTE [DISTWIDTH] IN BLOOD BY AUTOMATED COUNT: 18.6 % (ref 11.5–14.5)
EST. GFR  (NO RACE VARIABLE): >60 ML/MIN/1.73 M^2
GLUCOSE SERPL-MCNC: 63 MG/DL (ref 70–110)
GLUCOSE UR QL STRIP: NEGATIVE
HCT VFR BLD AUTO: 30.2 % (ref 37–48.5)
HGB BLD-MCNC: 9.7 G/DL (ref 12–16)
HGB UR QL STRIP: NEGATIVE
IMM GRANULOCYTES # BLD AUTO: 0.03 K/UL (ref 0–0.04)
IMM GRANULOCYTES NFR BLD AUTO: 0.3 % (ref 0–0.5)
KETONES UR QL STRIP: NEGATIVE
LEUKOCYTE ESTERASE UR QL STRIP: NEGATIVE
LYMPHOCYTES # BLD AUTO: 0.6 K/UL (ref 1–4.8)
LYMPHOCYTES NFR BLD: 6.3 % (ref 18–48)
MAGNESIUM SERPL-MCNC: 1.9 MG/DL (ref 1.6–2.6)
MCH RBC QN AUTO: 21.7 PG (ref 27–31)
MCHC RBC AUTO-ENTMCNC: 32.1 G/DL (ref 32–36)
MCV RBC AUTO: 68 FL (ref 82–98)
MONOCYTES # BLD AUTO: 0.3 K/UL (ref 0.3–1)
MONOCYTES NFR BLD: 2.9 % (ref 4–15)
NEUTROPHILS # BLD AUTO: 9.1 K/UL (ref 1.8–7.7)
NEUTROPHILS NFR BLD: 90.2 % (ref 38–73)
NITRITE UR QL STRIP: NEGATIVE
NRBC BLD-RTO: 0 /100 WBC
PH UR STRIP: 8 [PH] (ref 5–8)
PLATELET # BLD AUTO: 321 K/UL (ref 150–450)
PMV BLD AUTO: 11 FL (ref 9.2–12.9)
POC MOLECULAR INFLUENZA A AGN: NEGATIVE
POC MOLECULAR INFLUENZA B AGN: NEGATIVE
POCT GLUCOSE: 81 MG/DL (ref 70–110)
POTASSIUM SERPL-SCNC: 3.7 MMOL/L (ref 3.5–5.1)
PROT SERPL-MCNC: 7.5 G/DL (ref 6–8.4)
PROT UR QL STRIP: ABNORMAL
RBC # BLD AUTO: 4.47 M/UL (ref 4–5.4)
SARS-COV-2 RDRP RESP QL NAA+PROBE: NEGATIVE
SODIUM SERPL-SCNC: 143 MMOL/L (ref 136–145)
SP GR UR STRIP: 1.01 (ref 1–1.03)
URN SPEC COLLECT METH UR: ABNORMAL
UROBILINOGEN UR STRIP-ACNC: NEGATIVE EU/DL
WBC # BLD AUTO: 10.05 K/UL (ref 3.9–12.7)

## 2023-01-09 PROCEDURE — 96361 HYDRATE IV INFUSION ADD-ON: CPT

## 2023-01-09 PROCEDURE — 82962 GLUCOSE BLOOD TEST: CPT

## 2023-01-09 PROCEDURE — 81003 URINALYSIS AUTO W/O SCOPE: CPT | Performed by: EMERGENCY MEDICINE

## 2023-01-09 PROCEDURE — 81025 URINE PREGNANCY TEST: CPT | Performed by: EMERGENCY MEDICINE

## 2023-01-09 PROCEDURE — 96360 HYDRATION IV INFUSION INIT: CPT

## 2023-01-09 PROCEDURE — 83735 ASSAY OF MAGNESIUM: CPT | Performed by: EMERGENCY MEDICINE

## 2023-01-09 PROCEDURE — 93010 EKG 12-LEAD: ICD-10-PCS | Mod: ,,, | Performed by: INTERNAL MEDICINE

## 2023-01-09 PROCEDURE — 25000003 PHARM REV CODE 250: Performed by: EMERGENCY MEDICINE

## 2023-01-09 PROCEDURE — 93010 ELECTROCARDIOGRAM REPORT: CPT | Mod: ,,, | Performed by: INTERNAL MEDICINE

## 2023-01-09 PROCEDURE — 80183 DRUG SCRN QUANT OXCARBAZEPIN: CPT | Performed by: EMERGENCY MEDICINE

## 2023-01-09 PROCEDURE — 99285 EMERGENCY DEPT VISIT HI MDM: CPT | Mod: 25

## 2023-01-09 PROCEDURE — 85025 COMPLETE CBC W/AUTO DIFF WBC: CPT | Performed by: EMERGENCY MEDICINE

## 2023-01-09 PROCEDURE — 87502 INFLUENZA DNA AMP PROBE: CPT

## 2023-01-09 PROCEDURE — 93005 ELECTROCARDIOGRAM TRACING: CPT

## 2023-01-09 PROCEDURE — 80053 COMPREHEN METABOLIC PANEL: CPT | Performed by: EMERGENCY MEDICINE

## 2023-01-09 PROCEDURE — 87635 SARS-COV-2 COVID-19 AMP PRB: CPT | Performed by: EMERGENCY MEDICINE

## 2023-01-09 RX ORDER — DIAZEPAM 10 MG/2G
10 GEL RECTAL
Qty: 2 KIT | Refills: 5 | Status: SHIPPED | OUTPATIENT
Start: 2023-01-09 | End: 2023-07-11 | Stop reason: SDUPTHER

## 2023-01-09 RX ORDER — DIAZEPAM 10 MG/100UL
1 SPRAY NASAL
Qty: 2 EACH | Refills: 5 | Status: SHIPPED | OUTPATIENT
Start: 2023-01-09 | End: 2023-08-14 | Stop reason: SDUPTHER

## 2023-01-09 RX ADMIN — SODIUM CHLORIDE 1000 ML: 9 INJECTION, SOLUTION INTRAVENOUS at 04:01

## 2023-01-09 NOTE — DISCHARGE INSTRUCTIONS
Thank you for coming to our Emergency Department today. It is important to remember that some problems are difficult to diagnose and may not be found during your Emergency Department visit. Be sure to follow up with your primary care doctor and review all labs/imaging/tests that were performed during this visit with them. Some labs/tests may be outside of the normal range and require non-emergent follow-up and further investigation to help diagnose/exclude/prevent complications or other medical conditions.    If you do not have a primary care doctor, you may contact the one listed on your discharge paperwork or you may also call the [facility] Appointment Desk at [phone] to schedule an appointment and establish care with one. It is important to your health that you have a primary care doctor.    Medicaid Escalation Line:   [phone] - Please contact this number if you are having difficulty getting follow up with a Primary Care Provider or Speciality Provider.     Please take all medications as directed. All medications may potentially have side-effects and it is impossible to predict which medications may give you side-effects or what side-effects (if any) they will give you.. If you feel that you are having a negative effect or side-effect of any medication you should immediately stop taking them and seek medical attention. If you feel that you are having a life-threatening reaction call [phone].    Return to the ER with any questions/concerns, new/concerning symptoms, worsening or failure to improve.     Do not drive, swim, climb to height, take a bath or make any important decisions for 24 hours if you have received any pain medications, sedatives or mood altering drugs during your ER visit.        BELOW THIS LINE ONLY APPLIES IF YOU HAVE A COVID TEST PENDING OR IF YOU HAVE BEEN DIAGNOSED WITH COVID:  Please access [facility] to review the results of your test. Until the results of your COVID test  return, you should isolate yourself so as not to potentially spread illness to others.   If your COVID test returns positive, you should isolate yourself so as not to spread illness to others. After five full days, if you are feeling better and you have not had fever for 24 hours, you can return to your typical daily activities, but you must wear a mask around others for an additional 5 days.   If your COVID test returns negative and you are either unvaccinated or more than six months out from your two-dose vaccine and are not yet boosted, you should still quarantine for 5 full days followed by strict mask use for an additional 5 full days.   If your COVID test returns negative and you have received your 2-dose initial vaccine as well as a booster, you should continue strict mask use for 10 full days after the exposure.  For all those exposed, best practice includes a test at day 5 after the exposure. This can be a home test or a test through one of the many testing centers throughout our community.   Masking is always advised to limit the spread of COVID. Cdc.gov is an excellent site to obtain the latest up to date recommendations regarding COVID and COVID testing.     CDC Testing and Quarantine Guidelines for patients with exposure to a known-positive COVID-19 person:  A close exposure is defined as anyone who has had an exposure (masked or unmasked) to a known COVID -19 positive person within 6 feet of someone for a cumulative total of 15 minutes or more over a 24-hour period.   Vaccinated and/or if you recently had a positive covid test within 90 days do NOT need to quarantine after contact with someone who had COVID-19 unless you develop symptoms.   Fully vaccinated people who have not had a positive test within 90 days, should get tested 3-5 days after their exposure, even if they don't have symptoms and wear a mask indoors in public for 14 days following exposure or until their test result is  negative.      Unvaccinated and/or NOT had a positive test within 90 days and meet close exposure  You are required by CDC guidelines to quarantine for at least 5 days from time of exposure followed by 5 days of strict masking. It is recommended, but not required to test after 5 days, unless you develop symptoms, in which case you should test at that time.  If you get tested after 5 days and your test is positive, your 5 day period of isolation starts the day of the positive test.    If your exposure does not meet the above definition, you can return to your normal daily activities to include social distancing, wearing a mask and frequent handwashing.      Here is a link to guidance from the CDC:  https://www.cdc.gov/media/releases/2021/s1227-isolation-quarantine-guidance.html      [facility]  Health Testing Sites:  https://ldh.la.gov/page/3934      [facility] website with testing locations and guidance:  [email or web address]

## 2023-01-09 NOTE — ED TRIAGE NOTES
Pt to the ED via EMS with complaints of a seizure that started today around 1345. EMS reports the seizures are localized to left face and left arm. EMS reports pt's mother gave pt rectal valium, unsure of dose. EMS reports pt's mother states the rectal valium usually stop the seizures but it did not today so she called EMS. EMS reports pt is able to talk during seizure and is oriented. EMS gave pt 1mg ativan and seizures have stopped upon arrival to ED. Pt with hx of epilepsy and is compliant with medications. Pt also reports hx of hydrocephalus and cerebral palsy mostly affecting her left side. Pt arousable to voice, oriented x4 but appears lethargic.

## 2023-01-09 NOTE — TELEPHONE ENCOUNTER
Diazepam nasal spray  Diazepam rectal gel    As needed for breakthrough cluster seizures    Galilea Berger MD PhD  Neurology-Epilepsy  [facility].

## 2023-01-12 LAB — OXCARBAZEPINE METABOLITE: 26 MCG/ML (ref 10–35)

## 2023-01-23 ENCOUNTER — PATIENT MESSAGE (OUTPATIENT)
Dept: NEUROLOGY | Facility: CLINIC | Age: 23
End: 2023-01-23
Payer: MEDICAID

## 2023-01-24 ENCOUNTER — LAB VISIT (OUTPATIENT)
Dept: LAB | Facility: HOSPITAL | Age: 23
End: 2023-01-24
Payer: MEDICAID

## 2023-01-24 DIAGNOSIS — G40.219 LOCALIZATION-RELATED (FOCAL) (PARTIAL) SYMPTOMATIC EPILEPSY AND EPILEPTIC SYNDROMES WITH COMPLEX PARTIAL SEIZURES, INTRACTABLE, WITHOUT STATUS EPILEPTICUS: Primary | ICD-10-CM

## 2023-01-24 DIAGNOSIS — G40.219 LOCALIZATION-RELATED (FOCAL) (PARTIAL) SYMPTOMATIC EPILEPSY AND EPILEPTIC SYNDROMES WITH COMPLEX PARTIAL SEIZURES, INTRACTABLE, WITHOUT STATUS EPILEPTICUS: ICD-10-CM

## 2023-01-24 PROCEDURE — 36415 COLL VENOUS BLD VENIPUNCTURE: CPT

## 2023-01-24 PROCEDURE — 80235 DRUG ASSAY LACOSAMIDE: CPT

## 2023-01-24 PROCEDURE — 80183 DRUG SCRN QUANT OXCARBAZEPIN: CPT

## 2023-01-24 NOTE — PROGRESS NOTES
Reports increase in dizziness and nausea since discontinuing levetiracetam 12/31. Medication levels today and may adjust regimen pending results. Scheduled to follow up with neurosurgery 2/14/2023. Trial of brivaracetam 50mg BID in addition to current regimen: oxcarbazepine 600mg twice daily, perampanel 8mg at night, and lacosamide 200mg twice daily. Patient and mom are comfortable with plan. All questions and concerns are addressed at this time.     Stefania Franz PA-C   Neurology-Epilepsy  [facility]

## 2023-01-27 LAB — LACOSAMIDE: 15 MCG/ML (ref 1–10)

## 2023-01-28 LAB — OXCARBAZEPINE METABOLITE: 23 MCG/ML (ref 10–35)

## 2023-02-03 ENCOUNTER — PATIENT MESSAGE (OUTPATIENT)
Dept: NEUROLOGY | Facility: CLINIC | Age: 23
End: 2023-02-03
Payer: MEDICAID

## 2023-02-14 ENCOUNTER — TELEPHONE (OUTPATIENT)
Dept: NEUROSURGERY | Facility: CLINIC | Age: 23
End: 2023-02-14
Payer: MEDICAID

## 2023-02-22 ENCOUNTER — OFFICE VISIT (OUTPATIENT)
Dept: NEUROLOGY | Facility: CLINIC | Age: 23
End: 2023-02-22
Payer: MEDICAID

## 2023-02-22 VITALS
SYSTOLIC BLOOD PRESSURE: 127 MMHG | DIASTOLIC BLOOD PRESSURE: 88 MMHG | BODY MASS INDEX: 19.8 KG/M2 | WEIGHT: 98.19 LBS | HEART RATE: 73 BPM | HEIGHT: 59 IN

## 2023-02-22 DIAGNOSIS — Z98.2 S/P VP SHUNT: ICD-10-CM

## 2023-02-22 DIAGNOSIS — G91.9 HYDROCEPHALUS, UNSPECIFIED TYPE: ICD-10-CM

## 2023-02-22 DIAGNOSIS — F06.30 MOOD DISORDER DUE TO MEDICAL CONDITION: ICD-10-CM

## 2023-02-22 DIAGNOSIS — G40.219 LOCALIZATION-RELATED (FOCAL) (PARTIAL) SYMPTOMATIC EPILEPSY AND EPILEPTIC SYNDROMES WITH COMPLEX PARTIAL SEIZURES, INTRACTABLE, WITHOUT STATUS EPILEPTICUS: Primary | ICD-10-CM

## 2023-02-22 DIAGNOSIS — G80.9 CEREBRAL PALSY, UNSPECIFIED TYPE: ICD-10-CM

## 2023-02-22 PROCEDURE — 3074F SYST BP LT 130 MM HG: CPT | Mod: CPTII,,,

## 2023-02-22 PROCEDURE — 3008F PR BODY MASS INDEX (BMI) DOCUMENTED: ICD-10-PCS | Mod: CPTII,,,

## 2023-02-22 PROCEDURE — 99215 PR OFFICE/OUTPT VISIT, EST, LEVL V, 40-54 MIN: ICD-10-PCS | Mod: S$PBB,,,

## 2023-02-22 PROCEDURE — 1159F PR MEDICATION LIST DOCUMENTED IN MEDICAL RECORD: ICD-10-PCS | Mod: CPTII,,,

## 2023-02-22 PROCEDURE — 3074F PR MOST RECENT SYSTOLIC BLOOD PRESSURE < 130 MM HG: ICD-10-PCS | Mod: CPTII,,,

## 2023-02-22 PROCEDURE — 3008F BODY MASS INDEX DOCD: CPT | Mod: CPTII,,,

## 2023-02-22 PROCEDURE — 99999 PR PBB SHADOW E&M-EST. PATIENT-LVL III: CPT | Mod: PBBFAC,,,

## 2023-02-22 PROCEDURE — 99999 PR PBB SHADOW E&M-EST. PATIENT-LVL III: ICD-10-PCS | Mod: PBBFAC,,,

## 2023-02-22 PROCEDURE — 3079F PR MOST RECENT DIASTOLIC BLOOD PRESSURE 80-89 MM HG: ICD-10-PCS | Mod: CPTII,,,

## 2023-02-22 PROCEDURE — 99213 OFFICE O/P EST LOW 20 MIN: CPT | Mod: PBBFAC

## 2023-02-22 PROCEDURE — 3079F DIAST BP 80-89 MM HG: CPT | Mod: CPTII,,,

## 2023-02-22 PROCEDURE — 99215 OFFICE O/P EST HI 40 MIN: CPT | Mod: S$PBB,,,

## 2023-02-22 PROCEDURE — 1159F MED LIST DOCD IN RCRD: CPT | Mod: CPTII,,,

## 2023-02-22 NOTE — PROGRESS NOTES
"Name: Stephany Cohn  MRN:[id number]   CSN: [id number]  Date of service: 2/22/2023  Age:22 y.o.   Gender:female   Referring Physician/Service: No referring provider defined for this encounter.   The patient is here today with: MomYusuf     Neurology Clinic: Follow Up Visit    CHIEF COMPLAINT:  Epilepsy    Interval Events/ROS 2/22/2023:    Current ASM/SEs: brivaracetam 50mg BID, perampanel 8mg qhs, oxcarbazepine 600mg BID, and lacosamide 200mg BID; no reported SE  Breakthrough seizures/events: last seizure was 1/9  Folic acid: yes  Sleep: fair  Mood: fair, notes some improvement w/ sertraline 100mg daily     Dizziness has improved. Otherwise, no fever, no cold symptoms, no headache, no changes in vision, no new weakness, no chest pain, no shortness of breath, no nausea, no vomiting, no diarrhea, no constipation, no tingling/numbness, no problems walking. Patient and mom have no complaints today.     Recent Labs   Lab 08/18/21  1625 08/15/22  1100 01/09/23  1640 01/24/23  1330   Levetiracetam Lvl 9.8 40.2  --   --    Lacosamide  --   --   --  15.0 H   Oxcarbazepine 27 27 26 23   Perampanel (Fycompa) Quant  --  430  --   --       Interval Events/ROS 11/22/2022:    Current AED/SEs: Levetiracetam 1000mg BID (10 mL BID), Ocxarbazepine 600mg BID (10 mL BID), Parampanel 8mg qhs   Breakthrough seizures/events: yes, one breakthrough event 11/12/2022. No other seizures noted by patient.  Driving: no  Folic acid: yes  Sleep: 7-8 hours per night.   Mood: "Weird". Endorses mood is not the best currently.     Endorses "white floaters" in vision intermittently s/p event in November. Otherwise, no fever, no cold symptoms, no headache, no new weakness, no chest pain, no shortness of breath, no tingling/numbness, and no problems walking.     Recent Labs   Lab 08/18/21  1625 08/15/22  1100 01/09/23  1640 01/24/23  1330   Levetiracetam Lvl 9.8 40.2  --   --    Lacosamide  --   --   --  15.0 H   Oxcarbazepine 27 27 26 23 "   Perampanel (Fycompa) Quant  --  430  --   --      Interval Events/ROS 8/15/2022:    Accompanied by mom who also contributes to the history. One breakthrough event 7/6/2022 in the setting of missed medication/trouble obtaining refill from pharmacy. Current ASM regimen: levetiracetam 1000 mg twice daily, oxcarbazepine 600 mg twice daily, and perampanel 8 mg. Folic acid 1 mg daily. Prior to this, last seizure was 11/2021 after shunt revision. Gradually worsening agitation/irritability. Patient notes experiencing anxiety and depression. Passive thoughts of harming herself, no plan. No active SI or HI. Sleeping okay, has intermittent difficulty falling and staying asleep. Otherwise, no fever, no cold symptoms, no current headache, no changes in vision, no new weakness, no chest pain, no shortness of breath, no nausea, no vomiting, no diarrhea, no constipation, no tingling/numbness.    HPI 8/18/2021:     Age of first seizure:  First seizure was a febrile seizure as an infant, <2yo   Seizure Risk Factors:  Cerebral palsy, hydrocephalus,  shunt, no family history of seizures, no head strike with LOC, during birth -> ?brain bleed, premie at 1lb 7oz, csection for unclear reasons, was in incubator ?intubation, stayed a couple of months in the hospital before being able to go home (needed to be >5lb), initial shunt placed at 2yo with infection, revision at 1yo, no other CNS infections    Time of Last Seizure: 8/14/2021 (and then again walking out of clinic)  # of lifetime Seizures:  ?30 lifetime seizures   Frequency of Seizures: at most, 2 in one day, currently about 3-4 per year, seizure free for a couple of years seizure restarted when weaning off keppra   Seizure Triggers: decreased meds, poor sleep   Injuries/Hospitalization for seizures?  No injuries, when seizures started ED x5, then not as frequently because would use Diastat, 3 years since going to the ED for seizures  Pregnancy? Not sexually active, not interested  "in pregnancy   Contraception? no  Folic Acid? No   Bone Health:  No dexa      Auras: will sit down and tell mom that she is about to have a seizure     Seizure Events:   1. Previously, before this medication combo, start with left arm/leg jerking, eye darting to the side, able to talk through seizures, will have a hard time with vision, say she can not see, exhausted afterwards, no urinary incontinence, no tongue bite   2. Currently, comes out of nowhere, eyes go to back of head, left arm/leg twitching, no loss of consciousness, tired afterwards      Current AED/SEs:  1. Levetiracetam: solution currently -> 400mg/400mg/500mg at night   2. Oxcarbazepine 600mg twice daily SE none   3. Perampanel 4mg nightly   4. Rectal diazepam -> last used about a year ago     Previous AED/SEs or reason for DC.   1. Mom knows she has been on lots of medications but does not remember which ones      EEG: in the past -> results unclear     CT/MRI: no images currently. Not in at least 4 years.     AED compliance, adherence: no missed medications       ROS 8/18/2021:  Left spastic hemiparesis, cerebral palsy, hydrocephalus.  Frequent headaches. Sometimes will say shunt is hurting. Nauseated. Just moved back from [place] -> [place] there after [place], just moved back. Will get snappy and mean when she feels bad. No migraines in the family. Frontal and posterior headaches with photophobia, vertigo, N/V. Does not seem positional. N/V currently today, + headache, double vision with vomiting, limp, left side jerking, rare trips over the left side, usually wears a brace on left home, sit at home, watches TV. 10 headaches in the last month.  Otherwise denies poor vision at this time, no language difficulties, no difficulties with math, no cough or shortness of breath.  No chest pain.  No diarrhea or constipation.      EXAM:   - Vitals: /88   Pulse 73   Ht 4' 11" (1.499 m)   Wt 44.6 kg (98 lb 3.4 oz)   BMI 19.84 kg/m²    - General: " Awake and alert, NAD  - HEENT: NC/AT  - Neck:  Decreased range of motion  - Pulmonary: no increased WOB  - Cardiac: well perfused   - Abdomen: soft, nontender, nondistended  - Extremities:  Left arm/hand atrophic  - Skin: no rashes or lesions noted.     NEURO EXAM:   - Mental Status: Awake, alert, oriented x 3. Looks to mom for most details of the history but does provide some answers that mom does not know.  Language is simple words and phrases with intact repetition and comprehension.  Somewhat hesitant prosody. There were no paraphasic errors. Speech was not dysarthric.     - Cranial Nerves:   EOMI.  Activates somewhat faster on the right. Hearing intact to room voice. 5/5 strength in trapezii.  Does not lift up left shoulder. Tongue protrudes in midline and to either side with no evidence of atrophy or weakness.    - Motor:  Decreased bulk with increased left-sided tone.  Left pronation.     Delt Bic Tri WrE WrF  FFl FE IO IP Quad Ham TA Gastroc    R   5     5    5    5    5        5   5    5   5    5        5     5      5          L   4      5    4   3    5-        5    4   2    4    5       5     4      5         - Sensory: No deficits to light touch. No extinction to DSS.  - Coordination: No dysmetria on FNF  - Gait:  Hemiparetic gait with left limp, good balance     PLAN:   Stephany Cohn is a 22-year-old woman born extremely premature with ?  IPH with cerebral palsy, hydrocephalus requiring a  shunt, and refractory epilepsy. Currently stable on regimen of brivaracetam 50mg BID, perampanel 8mg qhs, oxcarbazepine 600mg BID, and lacosamide 200mg BID. Continue medications at same doses. Levels at next visit. Advised to reach out over the portal with any issues. Patient and mom are comfortable with plan. All questions and concerns are addressed at this time. Follow up in 6 months.     Patient Instructions   You came to Epilepsy Clinic because of your seizure disorder.  Your seizures are well  controlled on brivaracetam 50mg twice daily, perampanel 8mg nightl, oxcarbazepine 600mg twice daily and lacosamide 200mg twice daily.  Please continue the same medications at the same dose.     Do not miss any doses of medication. If a dose of medication is missed, take it as soon as it is remembered even if that means doubling up on the dose. Take folic acid 1mg daily. Get regular sleep. Go to sleep at the same time and wake up at the same time every day. People with epilepsy require more sleep than people without epilepsy.  Sleeping 10-12 hours a day can be normal for a person with epilepsy.  Every seizure makes it harder to prevent the next seizure. Epilepsy is associated with SUDEP, or sudden unexpected death in epilepsy.  The risk is significantly higher if convulsive seizures are not well controlled. For more information, check out these websites: https://www.epilepsy.com/, https://www.epilepsyallianceamerica.org/, www.donna-epilepsy.org, www.womenandepilepsy.org.      Per [place] law, no episodes of loss of consciousness for 6 months before driving.  Avoid dangerous situations.  For example, no baths/pools alone, no heights, no power tools.  Wear a bike helmet.  If breakthrough seizures occur that are different in character, frequency, or duration from normal episodes, please patient portal me or call the office and we will decide the next steps. If multiple seizures occur in a row without return back to baseline, 911 needs to be called.     Return to clinic in 6 months or sooner with issues.  Please patient portal with any questions or concerns.    Stefania Franz PA-C   Neurology-Epilepsy  [facility]         Problem List Items Addressed This Visit          Neuro    Cerebral palsy    Hydrocephalus    Localization-related (focal) (partial) symptomatic epilepsy and epileptic syndromes with complex partial seizures, intractable, without status epilepticus - Primary    S/P  shunt      Other Visit Diagnoses       Mood disorder due to medical condition                Disclaimer: This note has been generated using voice-recognition software. There may be typographical errors that were missed during proof-reading.     LABS:  Recent Labs   Lab 11/20/21  0901 11/24/21  0809 01/09/23  1640   WBC  --  10.13 10.05   Hemoglobin  --  10.6 L 9.7 L   Hematocrit  --  32.9 L 30.2 L   Platelets  --  325 321   Sodium 141 137 143   Potassium 3.7 3.5 3.7   BUN 10 11 8   Creatinine 0.7 0.8 0.8   eGFR if African American >60.0 >60.0  --    eGFR if non African American >60.0 >60.0  --        Recent Labs   Lab 08/18/21  1625 08/15/22  1100 01/09/23  1640 01/24/23  1330   Levetiracetam Lvl 9.8 40.2  --   --    Lacosamide  --   --   --  15.0 H   Oxcarbazepine 27 27 26 23   Perampanel (Fycompa) Quant  --  430  --   --         IMAGING:  Recent imaging is personally reviewed with the patient.    None in the system    PAST MEDICAL HISTORY:   Active Ambulatory Problems     Diagnosis Date Noted    Cerebral palsy     Hydrocephalus     Epilepsy     Left spastic hemiparesis     Localization-related (focal) (partial) symptomatic epilepsy and epileptic syndromes with complex partial seizures, intractable, without status epilepticus 08/18/2021    Intractable migraine with aura with status migrainosus 08/18/2021    S/P  shunt 08/18/2021    Decreased strength, endurance, and mobility 11/29/2021    Gait abnormality 11/29/2021    Balance problems 11/29/2021    Hypertension, essential, benign 11/30/2021    Left hand weakness 12/07/2021     Resolved Ambulatory Problems     Diagnosis Date Noted    No Resolved Ambulatory Problems     No Additional Past Medical History        PAST SURGICAL HISTORY:   Past Surgical History:   Procedure Laterality Date    REVISION OF VENTRICULOPERITONEAL SHUNT  2005    REVISION OF VENTRICULOPERITONEAL SHUNT Right 11/19/2021    Procedure: REVISION, SHUNT, VENTRICULOPERITONEAL;  Surgeon: Lien Arora,  MD;  [facility];  Service: Neurosurgery;  Laterality: Right;  proximal    REVISION OF VENTRICULOPERITONEAL SHUNT Right 11/23/2021    Procedure: REVISION, SHUNT, VENTRICULOPERITONEAL;  Surgeon: Lien Arora MD;  [facility];  Service: Neurosurgery;  Laterality: Right;    VENTRICULOPERITONEAL SHUNT Right     approx when pt. was 1 y/o then revised in jan. 2005        ALLERGIES: Sulfur and Sulfa (sulfonamide antibiotics)   CURRENT MEDICATIONS:   Current Outpatient Medications   Medication Sig Dispense Refill    brivaracetam (BRIVIACT) 50 mg Tab Take 1 tablet (50 mg total) by mouth 2 (two) times daily. 60 tablet 5    diazePAM (VALTOCO) 10 mg/spray (0.1 mL) Spry 1 spray by Nasal route as needed (for a seizure lasting more than 3 minutes or more than 3 seizures in an hour). 2 each 5    diazePAM 5-7.5-10 mg (DIASTAT ACUDIAL) 5-7.5-10 mg Kit rectal kit Place 10 mg rectally as needed (for seizures). 2 kit 5    folic acid (FOLVITE) 1 MG tablet Take 1 tablet (1 mg total) by mouth once daily. 90 tablet 3    lacosamide (VIMPAT) 200 mg Tab tablet Take 1 tablet (200 mg total) by mouth every 12 (twelve) hours. 60 tablet 5    lisinopriL 10 MG tablet Take 1 tablet (10 mg total) by mouth once daily. 90 tablet 3    OXcarbazepine (TRILEPTAL) 300 mg/5 mL (60 mg/mL) Susp Take 10 mLs (600 mg total) by mouth 2 (two) times daily. 1800 mL 3    perampaneL (FYCOMPA) 8 mg tablet Take 1 tablet (8 mg total) by mouth every evening. 30 tablet 5    sertraline (ZOLOFT) 100 MG tablet Take 1 tablet (100 mg total) by mouth once daily. 30 tablet 11    vitamin D (VITAMIN D3) 1000 units Tab Take 4,000 Units by mouth once daily.      galcanezumab-gnlm 120 mg/mL Syrg Inject 120 mg into the skin every 28 days. maintenance dose (Patient not taking: Reported on 8/15/2022) 1 Syringe 6     No current facility-administered medications for this visit.        FAMILY HISTORY:   Family History   Problem Relation Age of Onset     Hypertension Mother     Other Mother         HPV    Arthritis Mother     Hypertension Father     Hypertension Brother     Cancer Maternal Aunt          SOCIAL HISTORY:   Social History     Socioeconomic History    Marital status: Single   Tobacco Use    Smoking status: Never    Smokeless tobacco: Never   Substance and Sexual Activity    Alcohol use: Never    Drug use: Never    Sexual activity: Never      Questions and concerns raised by the patient and family/care-giver(s) were addressed and they indicated understanding of everything discussed and agreed to plans as above.    Stefania Franz PA-C   Neurology-Epilepsy  [facility]    Collaborating physician, Dr. Galilea Berger, was available during today's encounter.     I spent approximately 40 minutes on the day of this encounter preparing to see the patient, obtaining and reviewing history and results, performing a medically appropriate exam, counseling and educating the patient/family/caregiver, documenting clinical information, coordinating care, and ordering medications, tests, procedures, and referrals.

## 2023-02-24 NOTE — PATIENT INSTRUCTIONS
You came to Epilepsy Clinic because of your seizure disorder.  Your seizures are well controlled on brivaracetam 50mg twice daily, perampanel 8mg nightl, oxcarbazepine 600mg twice daily and lacosamide 200mg twice daily.  Please continue the same medications at the same dose.     Do not miss any doses of medication. If a dose of medication is missed, take it as soon as it is remembered even if that means doubling up on the dose. Take folic acid 1mg daily. Get regular sleep. Go to sleep at the same time and wake up at the same time every day. People with epilepsy require more sleep than people without epilepsy.  Sleeping 10-12 hours a day can be normal for a person with epilepsy.  Every seizure makes it harder to prevent the next seizure. Epilepsy is associated with SUDEP, or sudden unexpected death in epilepsy.  The risk is significantly higher if convulsive seizures are not well controlled. For more information, check out these websites: https://www.epilepsy.com/, https://www.epilepsyallianceamerica.org/, www.donna-epilepsy.org, www.womenandepilepsy.org.      Per [place] law, no episodes of loss of consciousness for 6 months before driving.  Avoid dangerous situations.  For example, no baths/pools alone, no heights, no power tools.  Wear a bike helmet.  If breakthrough seizures occur that are different in character, frequency, or duration from normal episodes, please patient portal me or call the office and we will decide the next steps. If multiple seizures occur in a row without return back to baseline, 911 needs to be called.     Return to clinic in 6 months or sooner with issues.  Please patient portal with any questions or concerns.    Stefania Franz PA-C   Neurology-Epilepsy  [facility]

## 2023-03-06 ENCOUNTER — PATIENT MESSAGE (OUTPATIENT)
Dept: NEUROLOGY | Facility: CLINIC | Age: 23
End: 2023-03-06
Payer: MEDICAID

## 2023-03-06 DIAGNOSIS — G40.219 LOCALIZATION-RELATED (FOCAL) (PARTIAL) SYMPTOMATIC EPILEPSY AND EPILEPTIC SYNDROMES WITH COMPLEX PARTIAL SEIZURES, INTRACTABLE, WITHOUT STATUS EPILEPTICUS: ICD-10-CM

## 2023-03-06 RX ORDER — FOLIC ACID 1 MG/1
1 TABLET ORAL DAILY
Qty: 90 TABLET | Refills: 3 | Status: SHIPPED | OUTPATIENT
Start: 2023-03-06 | End: 2023-08-14

## 2023-03-10 ENCOUNTER — PATIENT MESSAGE (OUTPATIENT)
Dept: NEUROLOGY | Facility: CLINIC | Age: 23
End: 2023-03-10
Payer: MEDICAID

## 2023-03-22 ENCOUNTER — HOSPITAL ENCOUNTER (OUTPATIENT)
Dept: RADIOLOGY | Facility: HOSPITAL | Age: 23
Discharge: HOME | End: 2023-03-22
Attending: STUDENT IN AN ORGANIZED HEALTH CARE EDUCATION/TRAINING PROGRAM
Payer: MEDICAID

## 2023-03-22 ENCOUNTER — OFFICE VISIT (OUTPATIENT)
Dept: NEUROSURGERY | Facility: CLINIC | Age: 23
End: 2023-03-22
Payer: MEDICAID

## 2023-03-22 DIAGNOSIS — Z98.2 VP (VENTRICULOPERITONEAL) SHUNT STATUS: ICD-10-CM

## 2023-03-22 DIAGNOSIS — G91.9 HYDROCEPHALUS, UNSPECIFIED TYPE: Primary | ICD-10-CM

## 2023-03-22 DIAGNOSIS — G80.9 CEREBRAL PALSY, UNSPECIFIED TYPE: ICD-10-CM

## 2023-03-22 DIAGNOSIS — Z98.2 S/P VP SHUNT: ICD-10-CM

## 2023-03-22 DIAGNOSIS — G91.9 HYDROCEPHALUS, UNSPECIFIED TYPE: ICD-10-CM

## 2023-03-22 PROCEDURE — 74018 XR SHUNT SERIES: ICD-10-PCS | Mod: 26,,, | Performed by: RADIOLOGY

## 2023-03-22 PROCEDURE — 71045 XR SHUNT SERIES: ICD-10-PCS | Mod: 26,,, | Performed by: RADIOLOGY

## 2023-03-22 PROCEDURE — 71045 X-RAY EXAM CHEST 1 VIEW: CPT | Mod: TC

## 2023-03-22 PROCEDURE — 70250 XR SHUNT SERIES: ICD-10-PCS | Mod: 26,,, | Performed by: RADIOLOGY

## 2023-03-22 PROCEDURE — 99214 PR OFFICE/OUTPT VISIT, EST, LEVL IV, 30-39 MIN: ICD-10-PCS | Mod: S$PBB,,, | Performed by: PHYSICIAN ASSISTANT

## 2023-03-22 PROCEDURE — 4010F ACE/ARB THERAPY RXD/TAKEN: CPT | Mod: CPTII,,, | Performed by: PHYSICIAN ASSISTANT

## 2023-03-22 PROCEDURE — 70551 MRI BRAIN STEM W/O DYE: CPT | Mod: TC

## 2023-03-22 PROCEDURE — 70250 X-RAY EXAM OF SKULL: CPT | Mod: 26,,, | Performed by: RADIOLOGY

## 2023-03-22 PROCEDURE — 72020 X-RAY EXAM OF SPINE 1 VIEW: CPT | Mod: 26,,, | Performed by: RADIOLOGY

## 2023-03-22 PROCEDURE — 1159F MED LIST DOCD IN RCRD: CPT | Mod: CPTII,,, | Performed by: PHYSICIAN ASSISTANT

## 2023-03-22 PROCEDURE — 99999 PR PBB SHADOW E&M-EST. PATIENT-LVL III: ICD-10-PCS | Mod: PBBFAC,,, | Performed by: PHYSICIAN ASSISTANT

## 2023-03-22 PROCEDURE — 70551 MRI BRAIN STEM W/O DYE: CPT | Mod: 26,,, | Performed by: RADIOLOGY

## 2023-03-22 PROCEDURE — 1159F PR MEDICATION LIST DOCUMENTED IN MEDICAL RECORD: ICD-10-PCS | Mod: CPTII,,, | Performed by: PHYSICIAN ASSISTANT

## 2023-03-22 PROCEDURE — 4010F PR ACE/ARB THEARPY RXD/TAKEN: ICD-10-PCS | Mod: CPTII,,, | Performed by: PHYSICIAN ASSISTANT

## 2023-03-22 PROCEDURE — 72020 XR SHUNT SERIES: ICD-10-PCS | Mod: 26,,, | Performed by: RADIOLOGY

## 2023-03-22 PROCEDURE — 99213 OFFICE O/P EST LOW 20 MIN: CPT | Mod: PBBFAC,25 | Performed by: PHYSICIAN ASSISTANT

## 2023-03-22 PROCEDURE — 99999 PR PBB SHADOW E&M-EST. PATIENT-LVL III: CPT | Mod: PBBFAC,,, | Performed by: PHYSICIAN ASSISTANT

## 2023-03-22 PROCEDURE — 74018 RADEX ABDOMEN 1 VIEW: CPT | Mod: 26,,, | Performed by: RADIOLOGY

## 2023-03-22 PROCEDURE — 70551 MRI BRAIN LIMITED(SHUNT CHECK) WITHOUT CONTRAST: ICD-10-PCS | Mod: 26,,, | Performed by: RADIOLOGY

## 2023-03-22 PROCEDURE — 74018 RADEX ABDOMEN 1 VIEW: CPT | Mod: TC

## 2023-03-22 PROCEDURE — 99214 OFFICE O/P EST MOD 30 MIN: CPT | Mod: S$PBB,,, | Performed by: PHYSICIAN ASSISTANT

## 2023-03-22 PROCEDURE — 71045 X-RAY EXAM CHEST 1 VIEW: CPT | Mod: 26,,, | Performed by: RADIOLOGY

## 2023-03-22 RX ORDER — LEVETIRACETAM 100 MG/ML
1000 SOLUTION ORAL 2 TIMES DAILY
COMMUNITY
Start: 2023-03-14 | End: 2023-08-02

## 2023-03-22 NOTE — PROGRESS NOTES
Neurosurgery  Established Patient    SUBJECTIVE:     History of Present Illness:  Stephany Cohn is a 21 y.o. female with h/o epilepsy and hydrocephalus who is status post right parietoccipital shunt revisions on 11/19/21 & 11/23/21 (Strata II at 1.5). Stephany was last seen on 3/29/22 and returns for scheduled follow up after shunt MR.  She continues to do well since her shunt revision with no recurrence of symptoms or seizures. No headaches, no nausea/vomiting or sleepiness. She does note some posterior neck pain with tilting her head back but denies new focal deficits.     Review of patient's allergies indicates:   Allergen Reactions    Sulfur Hives    Sulfa (sulfonamide antibiotics)        Current Outpatient Medications   Medication Sig Dispense Refill    brivaracetam (BRIVIACT) 50 mg Tab Take 1 tablet (50 mg total) by mouth 2 (two) times daily. 60 tablet 5    diazePAM (VALTOCO) 10 mg/spray (0.1 mL) Spry 1 spray by Nasal route as needed (for a seizure lasting more than 3 minutes or more than 3 seizures in an hour). 2 each 5    diazePAM 5-7.5-10 mg (DIASTAT ACUDIAL) 5-7.5-10 mg Kit rectal kit Place 10 mg rectally as needed (for seizures). 2 kit 5    folic acid (FOLVITE) 1 MG tablet Take 1 tablet (1 mg total) by mouth once daily. 90 tablet 3    lacosamide (VIMPAT) 200 mg Tab tablet Take 1 tablet (200 mg total) by mouth every 12 (twelve) hours. 60 tablet 5    lisinopriL 10 MG tablet Take 1 tablet (10 mg total) by mouth once daily. 90 tablet 3    OXcarbazepine (TRILEPTAL) 300 mg/5 mL (60 mg/mL) Susp Take 10 mLs (600 mg total) by mouth 2 (two) times daily. 1800 mL 3    perampaneL (FYCOMPA) 8 mg tablet Take 1 tablet (8 mg total) by mouth every evening. 30 tablet 5    sertraline (ZOLOFT) 100 MG tablet Take 1 tablet (100 mg total) by mouth once daily. 30 tablet 11    vitamin D (VITAMIN D3) 1000 units Tab Take 4,000 Units by mouth once daily.       No current facility-administered medications for this visit.       Past  Medical History:   Diagnosis Date    Cerebral palsy     Epilepsy     Hydrocephalus     Left spastic hemiparesis      Past Surgical History:   Procedure Laterality Date    REVISION OF VENTRICULOPERITONEAL SHUNT  2005    REVISION OF VENTRICULOPERITONEAL SHUNT Right 11/19/2021    Procedure: REVISION, SHUNT, VENTRICULOPERITONEAL;  Surgeon: Lien Arora MD;  [facility];  Service: Neurosurgery;  Laterality: Right;  proximal    REVISION OF VENTRICULOPERITONEAL SHUNT Right 11/23/2021    Procedure: REVISION, SHUNT, VENTRICULOPERITONEAL;  Surgeon: Lien Arora MD;  [facility];  Service: Neurosurgery;  Laterality: Right;    VENTRICULOPERITONEAL SHUNT Right     approx when pt. was 2 y/o then revised in jan. 2005     Family History       Problem Relation (Age of Onset)    Arthritis Mother    Cancer Maternal Aunt    Hypertension Mother, Father, Brother    Other Mother          Social History     Socioeconomic History    Marital status: Single   Tobacco Use    Smoking status: Never    Smokeless tobacco: Never   Substance and Sexual Activity    Alcohol use: Never    Drug use: Never    Sexual activity: Never       Review of Systems  All other systems reviewed and are negative.    OBJECTIVE:     Vital Signs     There is no height or weight on file to calculate BMI.    Neurosurgery Physical Exam  General: No apparent distress  Head: normocephalic, atraumatic, no fluid along shunt tubing, shunt pumps & refills easily  Neurologic: alert & oriented, responds appropriately and FCx4  Cranial nerves: face symmetric, tongue midline, CN II-XII grossly intact.   Eyes: pupils equal, round, reactive to light with accomodation, EOMI.   Pulmonary: no signs of respiratory distress, symmetric expansion  Skin: Skin is cool, dry and intact.  Sensory: intact to light touch throughout  Motor Strength: chronic left hemiparesis- moves against gravity       Diagnostic Results:  MRI brain limited dated 3/22/23 reviewed  and appears stable as compared to previous with stable moderate prominence of the lateral and 3rd ventricles.     Xray shunt series dated 3/22/23 reviewed and shows continues shunt tubing without kink or fracture.     ASSESSMENT/PLAN:     20 yo female with history of shunted hydrocephalus since birth status post right shunt revisions on 11/19/21 & 11/23/21 (Strata @ 1.5) who is doing well today without radiographic or clinical findings concerning for shunt malfunction.  Her shunt was interrogated and valve confirmed at 1.5. We will see her back in one year with annual surveillance imaging or sooner with any new concerns.       Flores Chang PA-C  Neurosurgery            Note dictated with voice recognition software, please excuse any grammatical errors.

## 2023-07-11 DIAGNOSIS — G40.211 LOCALIZATION-RELATED (FOCAL) (PARTIAL) SYMPTOMATIC EPILEPSY AND EPILEPTIC SYNDROMES WITH COMPLEX PARTIAL SEIZURES, INTRACTABLE, WITH STATUS EPILEPTICUS: ICD-10-CM

## 2023-07-11 RX ORDER — DIAZEPAM 10 MG/2G
10 GEL RECTAL
Qty: 2 KIT | Refills: 5 | Status: SHIPPED | OUTPATIENT
Start: 2023-07-11

## 2023-07-11 NOTE — TELEPHONE ENCOUNTER
Rectal Diastat as needed for breakthrough cluster and prolonged seizures    Galilea Berger MD PhD [facility]  Neurology-Epilepsy  [facility].

## 2023-08-02 DIAGNOSIS — G40.219 LOCALIZATION-RELATED (FOCAL) (PARTIAL) SYMPTOMATIC EPILEPSY AND EPILEPTIC SYNDROMES WITH COMPLEX PARTIAL SEIZURES, INTRACTABLE, WITHOUT STATUS EPILEPTICUS: ICD-10-CM

## 2023-08-14 DIAGNOSIS — G40.211 LOCALIZATION-RELATED (FOCAL) (PARTIAL) SYMPTOMATIC EPILEPSY AND EPILEPTIC SYNDROMES WITH COMPLEX PARTIAL SEIZURES, INTRACTABLE, WITH STATUS EPILEPTICUS: ICD-10-CM

## 2023-08-14 DIAGNOSIS — G40.219 LOCALIZATION-RELATED (FOCAL) (PARTIAL) SYMPTOMATIC EPILEPSY AND EPILEPTIC SYNDROMES WITH COMPLEX PARTIAL SEIZURES, INTRACTABLE, WITHOUT STATUS EPILEPTICUS: ICD-10-CM

## 2023-08-14 RX ORDER — DIAZEPAM 10 MG/100UL
1 SPRAY NASAL
Qty: 2 EACH | Refills: 5 | Status: SHIPPED | OUTPATIENT
Start: 2023-08-14

## 2023-08-14 RX ORDER — FOLIC ACID 1 MG/1
1 TABLET ORAL DAILY
Qty: 90 TABLET | Refills: 3 | Status: SHIPPED | OUTPATIENT
Start: 2023-08-14 | End: 2024-02-14 | Stop reason: SDUPTHER

## 2023-08-14 NOTE — TELEPHONE ENCOUNTER
Diazepam nasal spray as needed for breakthrough seizures    Galilea Berger MD PhD [facility]  Neurology-Epilepsy  [facility].

## 2023-08-22 ENCOUNTER — PATIENT MESSAGE (OUTPATIENT)
Dept: NEUROLOGY | Facility: CLINIC | Age: 23
End: 2023-08-22
Payer: MEDICAID

## 2023-09-18 ENCOUNTER — PATIENT MESSAGE (OUTPATIENT)
Dept: NEUROLOGY | Facility: CLINIC | Age: 23
End: 2023-09-18
Payer: MEDICAID

## 2023-09-18 DIAGNOSIS — G40.219 LOCALIZATION-RELATED (FOCAL) (PARTIAL) SYMPTOMATIC EPILEPSY AND EPILEPTIC SYNDROMES WITH COMPLEX PARTIAL SEIZURES, INTRACTABLE, WITHOUT STATUS EPILEPTICUS: ICD-10-CM

## 2023-09-18 RX ORDER — OXCARBAZEPINE 60 MG/ML
600 SUSPENSION ORAL 2 TIMES DAILY
Qty: 1800 ML | Refills: 3 | Status: SHIPPED | OUTPATIENT
Start: 2023-09-18 | End: 2024-01-22

## 2023-12-14 DIAGNOSIS — F06.30 MOOD DISORDER DUE TO MEDICAL CONDITION: ICD-10-CM

## 2023-12-14 RX ORDER — SERTRALINE HYDROCHLORIDE 100 MG/1
100 TABLET, FILM COATED ORAL DAILY
Qty: 90 TABLET | Refills: 3 | Status: SHIPPED | OUTPATIENT
Start: 2023-12-14 | End: 2024-01-22

## 2024-01-22 ENCOUNTER — HOSPITAL ENCOUNTER (EMERGENCY)
Facility: HOSPITAL | Age: 24
Discharge: HOME | End: 2024-01-22
Attending: EMERGENCY MEDICINE
Payer: MEDICAID

## 2024-01-22 VITALS
BODY MASS INDEX: 22.18 KG/M2 | WEIGHT: 110 LBS | HEART RATE: 90 BPM | RESPIRATION RATE: 16 BRPM | TEMPERATURE: 99 F | SYSTOLIC BLOOD PRESSURE: 130 MMHG | HEIGHT: 59 IN | OXYGEN SATURATION: 98 % | DIASTOLIC BLOOD PRESSURE: 85 MMHG

## 2024-01-22 DIAGNOSIS — F06.30 MOOD DISORDER DUE TO MEDICAL CONDITION: ICD-10-CM

## 2024-01-22 DIAGNOSIS — G40.211 LOCALIZATION-RELATED (FOCAL) (PARTIAL) SYMPTOMATIC EPILEPSY AND EPILEPTIC SYNDROMES WITH COMPLEX PARTIAL SEIZURES, INTRACTABLE, WITH STATUS EPILEPTICUS: ICD-10-CM

## 2024-01-22 DIAGNOSIS — G40.219 LOCALIZATION-RELATED (FOCAL) (PARTIAL) SYMPTOMATIC EPILEPSY AND EPILEPTIC SYNDROMES WITH COMPLEX PARTIAL SEIZURES, INTRACTABLE, WITHOUT STATUS EPILEPTICUS: ICD-10-CM

## 2024-01-22 DIAGNOSIS — R56.9 SEIZURE: Primary | ICD-10-CM

## 2024-01-22 LAB
ALBUMIN SERPL BCP-MCNC: 4.1 G/DL (ref 3.5–5.2)
ALP SERPL-CCNC: 45 U/L (ref 55–135)
ALT SERPL W/O P-5'-P-CCNC: 7 U/L (ref 10–44)
ANION GAP SERPL CALC-SCNC: 6 MMOL/L (ref 8–16)
AST SERPL-CCNC: 13 U/L (ref 10–40)
B-HCG UR QL: NEGATIVE
BACTERIA #/AREA URNS HPF: ABNORMAL /HPF
BASOPHILS # BLD AUTO: 0.02 K/UL (ref 0–0.2)
BASOPHILS NFR BLD: 0.3 % (ref 0–1.9)
BILIRUB SERPL-MCNC: 0.2 MG/DL (ref 0.1–1)
BILIRUB UR QL STRIP: NEGATIVE
BUN SERPL-MCNC: 10 MG/DL (ref 6–20)
CALCIUM SERPL-MCNC: 8.8 MG/DL (ref 8.7–10.5)
CHLORIDE SERPL-SCNC: 111 MMOL/L (ref 95–110)
CK SERPL-CCNC: 79 U/L (ref 20–180)
CLARITY UR: CLEAR
CO2 SERPL-SCNC: 22 MMOL/L (ref 23–29)
COLOR UR: YELLOW
CREAT SERPL-MCNC: 0.8 MG/DL (ref 0.5–1.4)
CTP QC/QA: YES
DIFFERENTIAL METHOD BLD: ABNORMAL
EOSINOPHIL # BLD AUTO: 0 K/UL (ref 0–0.5)
EOSINOPHIL NFR BLD: 0.1 % (ref 0–8)
ERYTHROCYTE [DISTWIDTH] IN BLOOD BY AUTOMATED COUNT: 18.4 % (ref 11.5–14.5)
EST. GFR  (NO RACE VARIABLE): >60 ML/MIN/1.73 M^2
GLUCOSE SERPL-MCNC: 89 MG/DL (ref 70–110)
GLUCOSE UR QL STRIP: NEGATIVE
GRAN CASTS #/AREA URNS LPF: 3 /LPF
HCT VFR BLD AUTO: 29.1 % (ref 37–48.5)
HGB BLD-MCNC: 9 G/DL (ref 12–16)
HGB UR QL STRIP: NEGATIVE
HYALINE CASTS #/AREA URNS LPF: 1 /LPF
IMM GRANULOCYTES # BLD AUTO: 0.01 K/UL (ref 0–0.04)
IMM GRANULOCYTES NFR BLD AUTO: 0.1 % (ref 0–0.5)
KETONES UR QL STRIP: ABNORMAL
LEUKOCYTE ESTERASE UR QL STRIP: NEGATIVE
LYMPHOCYTES # BLD AUTO: 1 K/UL (ref 1–4.8)
LYMPHOCYTES NFR BLD: 14.9 % (ref 18–48)
MAGNESIUM SERPL-MCNC: 1.8 MG/DL (ref 1.6–2.6)
MCH RBC QN AUTO: 20.5 PG (ref 27–31)
MCHC RBC AUTO-ENTMCNC: 30.9 G/DL (ref 32–36)
MCV RBC AUTO: 66 FL (ref 82–98)
MICROSCOPIC COMMENT: ABNORMAL
MONOCYTES # BLD AUTO: 0.4 K/UL (ref 0.3–1)
MONOCYTES NFR BLD: 5.4 % (ref 4–15)
NEUTROPHILS # BLD AUTO: 5.3 K/UL (ref 1.8–7.7)
NEUTROPHILS NFR BLD: 79.2 % (ref 38–73)
NITRITE UR QL STRIP: NEGATIVE
NRBC BLD-RTO: 0 /100 WBC
PH UR STRIP: 7 [PH] (ref 5–8)
PHOSPHATE SERPL-MCNC: 2.4 MG/DL (ref 2.7–4.5)
PLATELET # BLD AUTO: 294 K/UL (ref 150–450)
PMV BLD AUTO: 11.4 FL (ref 9.2–12.9)
POTASSIUM SERPL-SCNC: 3.9 MMOL/L (ref 3.5–5.1)
PROT SERPL-MCNC: 7.3 G/DL (ref 6–8.4)
PROT UR QL STRIP: ABNORMAL
RBC # BLD AUTO: 4.4 M/UL (ref 4–5.4)
RBC #/AREA URNS HPF: 1 /HPF (ref 0–4)
SODIUM SERPL-SCNC: 139 MMOL/L (ref 136–145)
SP GR UR STRIP: 1.03 (ref 1–1.03)
SQUAMOUS #/AREA URNS HPF: 3 /HPF
URN SPEC COLLECT METH UR: ABNORMAL
UROBILINOGEN UR STRIP-ACNC: NEGATIVE EU/DL
WBC # BLD AUTO: 6.71 K/UL (ref 3.9–12.7)
WBC #/AREA URNS HPF: 2 /HPF (ref 0–5)
WBC CLUMPS URNS QL MICRO: ABNORMAL

## 2024-01-22 PROCEDURE — 85025 COMPLETE CBC W/AUTO DIFF WBC: CPT | Performed by: EMERGENCY MEDICINE

## 2024-01-22 PROCEDURE — 80299 QUANTITATIVE ASSAY DRUG: CPT | Performed by: EMERGENCY MEDICINE

## 2024-01-22 PROCEDURE — 25000003 PHARM REV CODE 250: Performed by: EMERGENCY MEDICINE

## 2024-01-22 PROCEDURE — 80183 DRUG SCRN QUANT OXCARBAZEPIN: CPT | Performed by: EMERGENCY MEDICINE

## 2024-01-22 PROCEDURE — 80053 COMPREHEN METABOLIC PANEL: CPT | Performed by: EMERGENCY MEDICINE

## 2024-01-22 PROCEDURE — 82550 ASSAY OF CK (CPK): CPT | Performed by: EMERGENCY MEDICINE

## 2024-01-22 PROCEDURE — 96360 HYDRATION IV INFUSION INIT: CPT

## 2024-01-22 PROCEDURE — 84100 ASSAY OF PHOSPHORUS: CPT | Performed by: EMERGENCY MEDICINE

## 2024-01-22 PROCEDURE — 81000 URINALYSIS NONAUTO W/SCOPE: CPT | Performed by: EMERGENCY MEDICINE

## 2024-01-22 PROCEDURE — 96361 HYDRATE IV INFUSION ADD-ON: CPT

## 2024-01-22 PROCEDURE — 83735 ASSAY OF MAGNESIUM: CPT | Performed by: EMERGENCY MEDICINE

## 2024-01-22 PROCEDURE — 99285 EMERGENCY DEPT VISIT HI MDM: CPT | Mod: 25

## 2024-01-22 PROCEDURE — 80235 DRUG ASSAY LACOSAMIDE: CPT | Performed by: EMERGENCY MEDICINE

## 2024-01-22 PROCEDURE — 81025 URINE PREGNANCY TEST: CPT | Performed by: EMERGENCY MEDICINE

## 2024-01-22 RX ORDER — OXCARBAZEPINE 60 MG/ML
600 SUSPENSION ORAL 2 TIMES DAILY
Qty: 1800 ML | Refills: 3 | Status: SHIPPED | OUTPATIENT
Start: 2024-01-22 | End: 2024-02-14 | Stop reason: SDUPTHER

## 2024-01-22 RX ORDER — SERTRALINE HYDROCHLORIDE 100 MG/1
100 TABLET, FILM COATED ORAL DAILY
Qty: 90 TABLET | Refills: 3 | Status: SHIPPED | OUTPATIENT
Start: 2024-01-22 | End: 2024-02-14 | Stop reason: SDUPTHER

## 2024-01-22 RX ORDER — OXCARBAZEPINE 60 MG/ML
600 SUSPENSION ORAL ONCE
Status: COMPLETED | OUTPATIENT
Start: 2024-01-22 | End: 2024-01-22

## 2024-01-22 RX ORDER — LACOSAMIDE 200 MG/1
200 TABLET ORAL EVERY 12 HOURS
Qty: 60 TABLET | Refills: 5 | Status: SHIPPED | OUTPATIENT
Start: 2024-01-22 | End: 2024-02-14 | Stop reason: SDUPTHER

## 2024-01-22 RX ORDER — LACOSAMIDE 50 MG/1
200 TABLET ORAL ONCE
Status: COMPLETED | OUTPATIENT
Start: 2024-01-22 | End: 2024-01-22

## 2024-01-22 RX ADMIN — LACOSAMIDE 200 MG: 50 TABLET, FILM COATED ORAL at 09:01

## 2024-01-22 RX ADMIN — OXCARBAZEPINE 600 MG: 60 SUSPENSION ORAL at 09:01

## 2024-01-22 RX ADMIN — SODIUM CHLORIDE 1000 ML: 9 INJECTION, SOLUTION INTRAVENOUS at 08:01

## 2024-01-22 NOTE — ED PROVIDER NOTES
Encounter Date: 1/22/2024    SCRIBE #1 NOTE: IFlavia, am scribing for, and in the presence of,  Jean Lim MD. Other sections scribed: HPI, ROS.       History     Chief Complaint   Patient presents with    Seizures     CC: Seizures    HPI: History is obtained from independent historian: pt and pt's mother. This is a 23 y.o. F who has a PMHx of Cerebral palsy, Epilepsy, Hydrocephalus, and Left spastic hemiparesis who presents to the ED for emergent evaluation of acute witnessed seizure episodes that occurred upon waking this morning. Pt's mother reports witnessing 2 seizure episodes. The pt's mother administered 2 dosages of Diastat acudial rectally PTA. Seizures resolved upon arrival to the ED. Since the seizure, the pt currently complains of fatigue, and headache. Her last seizure prior to today was a while ago. Pt states that she takes Briviact, Fycompa, and Lisinopril. Pt states that she ran out of Briviact, and Fycompla 2 days ago. She did not take medications by mouth today. Pt has a shunt in place. Per mother, the pt's shunt was last revived in 2022. Pt denies fever, chills, cough, or any recent illness.    The history is provided by the patient and a parent. No  was used.     Review of patient's allergies indicates:   Allergen Reactions    Sulfur Hives    Sulfa (sulfonamide antibiotics)      Past Medical History:   Diagnosis Date    Cerebral palsy     Epilepsy     Hydrocephalus     Left spastic hemiparesis      Past Surgical History:   Procedure Laterality Date    REVISION OF VENTRICULOPERITONEAL SHUNT  2005    REVISION OF VENTRICULOPERITONEAL SHUNT Right 11/19/2021    Procedure: REVISION, SHUNT, VENTRICULOPERITONEAL;  Surgeon: Lien Arora MD;  Location: [facility] OR [address] [place] [address];  Service: Neurosurgery;  Laterality: Right;  proximal    REVISION OF VENTRICULOPERITONEAL SHUNT Right 11/23/2021    Procedure: REVISION, SHUNT, VENTRICULOPERITONEAL;  Surgeon: Lien Arora MD;   Location: [facility] OR [address] [place] [address];  Service: Neurosurgery;  Laterality: Right;    VENTRICULOPERITONEAL SHUNT Right     approx when pt. was 1 y/o then revised in jan. 2005     Family History   Problem Relation Age of Onset    Hypertension Mother     Other Mother         HPV    Arthritis Mother     Hypertension Father     Hypertension Brother     Cancer Maternal Aunt      Social History     Tobacco Use    Smoking status: Never    Smokeless tobacco: Never   Substance Use Topics    Alcohol use: Never    Drug use: Never     Review of Systems   Constitutional:  Positive for fatigue. Negative for chills and fever.   HENT: Negative.     Eyes: Negative.    Respiratory: Negative.  Negative for cough.    Cardiovascular: Negative.    Gastrointestinal: Negative.    Genitourinary: Negative.    Musculoskeletal: Negative.    Skin: Negative.    Neurological:  Positive for seizures and headaches.       Physical Exam     Initial Vitals [01/22/24 0756]   BP Pulse Resp Temp SpO2   (!) 134/90 108 19 99.2 °F (37.3 °C) 100 %      MAP       --         Physical Exam    Nursing note and vitals reviewed.  Constitutional: She appears well-developed and well-nourished. She is not diaphoretic. No distress.   HENT:   Head: Normocephalic and atraumatic.   Nose: Nose normal.   Eyes: EOM are normal. Pupils are equal, round, and reactive to light.   Neck: Neck supple. No JVD present.   Normal range of motion.  Cardiovascular:  Regular rhythm, normal heart sounds and intact distal pulses.           Tachycardic   Pulmonary/Chest: Breath sounds normal. No stridor. No respiratory distress. She has no wheezes. She has no rales.   Abdominal: Abdomen is soft. Bowel sounds are normal. She exhibits no distension. There is no abdominal tenderness.   Musculoskeletal:         General: No tenderness or edema. Normal range of motion.      Cervical back: Normal range of motion and neck supple.     Neurological: She is oriented to person, place, and time. She has normal  strength.   Drowsy appearing, slightly slow to respond but oriented, moving all extremities with ease   Skin: Skin is warm and dry. Capillary refill takes less than 2 seconds. No rash noted. No erythema.         ED Course   Procedures  Labs Reviewed   URINALYSIS, REFLEX TO URINE CULTURE - Abnormal; Notable for the following components:       Result Value    Protein, UA 1+ (*)     Ketones, UA 1+ (*)     All other components within normal limits    Narrative:     Specimen Source->Urine   CBC W/ AUTO DIFFERENTIAL - Abnormal; Notable for the following components:    Hemoglobin 9.0 (*)     Hematocrit 29.1 (*)     MCV 66 (*)     MCH 20.5 (*)     MCHC 30.9 (*)     RDW 18.4 (*)     Gran % 79.2 (*)     Lymph % 14.9 (*)     All other components within normal limits   COMPREHENSIVE METABOLIC PANEL - Abnormal; Notable for the following components:    Chloride 111 (*)     CO2 22 (*)     Alkaline Phosphatase 45 (*)     ALT 7 (*)     Anion Gap 6 (*)     All other components within normal limits   PHOSPHORUS - Abnormal; Notable for the following components:    Phosphorus 2.4 (*)     All other components within normal limits   URINALYSIS MICROSCOPIC - Abnormal; Notable for the following components:    Bacteria Few (*)     Granular Casts, UA 3 (*)     All other components within normal limits    Narrative:     Specimen Source->Urine   MAGNESIUM   CK   LACOSAMIDE (VIMPAT)   PERAMPANEL (FYCOMPA), QUANT   OXCARBAZEPINE METABOLITE (MHC)   LACOSAMIDE (VIMPAT)   POCT URINE PREGNANCY          Imaging Results              X-Ray Shunt Series (Final result)  Result time 01/22/24 10:37:04      Final result by Aramis Rey MD (01/22/24 10:37:04)                   Impression:      No focal shunt discontinuity.      Electronically signed by: Aramis Rey MD  Date:    01/22/2024  Time:    10:37               Narrative:    EXAMINATION:  XR SHUNT SERIES    CLINICAL HISTORY:  headache, nausea, seizure;    TECHNIQUE:  Routine shunt series, 5  total views.    COMPARISON:  03/22/2023.    FINDINGS:  Similar appearance of right-sided  shunt catheter with the tip terminating in the right hemipelvis.  No focal shunt discontinuity identified, allowing for relatively thin caliber of the shunt and overlapping cardiac wires.    Cardiac silhouette is not enlarged.  No focal consolidation.  No sizable pleural effusion.  No pneumothorax.    Bowel gas pattern is unremarkable.  Minimal left convex scoliotic curvature of the lumbar spine.                                        CT Head Without Contrast (Final result)  Result time 01/22/24 09:25:07      Final result by Aramis Rey MD (01/22/24 09:25:07)                   Impression:      Similar prominent ventricles and stable positioning of right parietal approach ventriculostomy catheter.    Trace subdural collection/trace hemorrhage along the left cerebral convexity measuring roughly 2-3 mm in thickness.  Artifact versus prominent cortical vein or dural thickening could also have a similar appearance.  No mass effect.    This report was flagged in Epic as abnormal.      Electronically signed by: Aramis Rey MD  Date:    01/22/2024  Time:    09:25               Narrative:    EXAMINATION:  CT HEAD WITHOUT CONTRAST    CLINICAL HISTORY:  Mental status change, unknown cause;shunt, seizure, HA w/ nausea;    TECHNIQUE:  Low dose axial images were obtained through the head.  Coronal and sagittal reformations were also performed. Contrast was not administered.    COMPARISON:  CT head, 01/09/2023.    FINDINGS:  Right posterior approach ventricular catheter with the tip terminating in the anterior horn right lateral ventricle near the midline.  Lateral ventricles are again enlarged, right side worse than left.  Third ventricle measures up to 1.1 cm in transverse width, similar to prior study.  Brain parenchyma is stable.    No evidence of acute territorial infarct, acute intracranial hemorrhage, or significant mass  effect.  No gross midline shift.  Dural calcifications along the right cerebral convexity.  Question minimal subdural collection along the left cerebral convexity measuring roughly 2-3 mm in size (axial image 18 and coronal image 36).  Stable minimal prominence of the pituitary gland without overt mass effect, potentially related to patient age.    No displaced calvarial fracture.    Visualized paranasal sinuses and mastoid air cells are essentially clear.                                       Medications   sodium chloride 0.9% bolus 1,000 mL 1,000 mL (0 mLs Intravenous Stopped 1/22/24 1136)   OXcarbazepine 300 mg/5 mL (60 mg/mL) suspension 600 mg (600 mg Oral Given 1/22/24 0912)   lacosamide tablet 200 mg (200 mg Oral Given 1/22/24 0940)     Medical Decision Making  Amount and/or Complexity of Data Reviewed  Independent Historian: parent     Details: See HPI  Labs: ordered.  Radiology: ordered.    Risk  Prescription drug management.      MDM:    23-year-old female with past medical history as noted above presenting with seizures.  Differential Diagnosis includes:  Status epilepticus, electrolyte abnormality, sepsis, intracranial hemorrhage.  Physical exam as noted above.  ED workup notable for urinalysis appears contaminated 3 squamous epithelial cells, CBC with hemoglobin 9.0, white blood cell count 6.71, CMP with creatinine 0.8, magnesium 1.8, phosphorus 2.4, CPK 79, shunt x-ray shows no abnormality, CT head shows similar prominent ventricles, trace subdural collection/trace hemorrhage possibly dural thickening versus artifact.  Patient presentation consistent with seizures, resolving after dissect given at home with mom x2, resulting inpatient drowsy here today.  Discussed further with Neurology as she has been unable to get her last medications, will prescribe today and have her follow-up closely in clinic.  She appears to be at baseline at this time.  Discussed further with Neurosurgery regarding finding on CT  scan today which appears to be artifact as there was evidence of this on prior CT and imaging from previous.  No further indication for further workup at this time.  She will need follow-up in clinic as well.  Do not suspect any additional surgical or medical emergency. Discussed diagnosis and further treatment with patient and with mother at bedside, including f/u.  Return precautions given and all questions answered.  Patient and mother in understanding of plan.  Pt discharged to home improved and stable.        Note was created using voice recognition software. Note may have occasional typographical or grammatical errors, garbled syntax, and other bizarre constructions that may not have been identified and edited despite good merle initial review prior to signing.             Scribe Attestation:   Scribe #1: I performed the above scribed service and the documentation accurately describes the services I performed. I attest to the accuracy of the note.                             I, Jean Lim M.D., personally performed the services described in this documentation. All medical record entries made by the scribe were at my direction and in my presence. I have reviewed the chart and agree that the record reflects my personal performance and is accurate and complete.      Clinical Impression:  Final diagnoses:  [R56.9] Seizure (Primary)          ED Disposition Condition    Discharge Stable          ED Prescriptions       Medication Sig Dispense Start Date End Date Auth. Provider    brivaracetam (BRIVIACT) 50 mg Tab Take 1 tablet (50 mg total) by mouth 2 (two) times daily. 60 tablet 1/22/2024 2/21/2024 Jean iLm MD    lacosamide (VIMPAT) 200 mg Tab tablet Take 1 tablet (200 mg total) by mouth every 12 (twelve) hours. 60 tablet 1/22/2024 1/21/2025 Jean Lim MD    OXcarbazepine (TRILEPTAL) 300 mg/5 mL (60 mg/mL) Susp Take 10 mL by mouth 2 (two) times daily. 1,800 mL 1/22/2024 1/22/2025  Jean Lim MD    perampaneL (FYCOMPA) 8 mg tablet Take 1 tablet (8 mg total) by mouth every evening. 30 tablet 1/22/2024 7/20/2024 Jean Lim MD    sertraline (ZOLOFT) 100 MG tablet Take 1 tablet (100 mg total) by mouth once daily. 90 tablet 1/22/2024 1/21/2025 Jean Lim MD          Follow-up Information       Follow up With Specialties Details Why Contact Info    [facility] - Emergency Dept Emergency Medicine Go to  If symptoms worsen [address] [facility] [place] [id number] [phone]    Tamy Rosario MD Internal Medicine, Wound Care Go in 1 week As needed [address] [place] [address] [address]  [place] [address]  [phone]      Galilea Berger MD PhD Neurology   [address] [facility]  [address]  [place] [facility] [address]  [phone]      Lien Arora MD Neurosurgery, Pediatric Neurosurgery   [address] [facility]  Department of Neurosurgery - 7th Floor  [facility] [address]  [phone]               Jean Lim MD  01/24/24 0306

## 2024-01-22 NOTE — ED TRIAGE NOTES
"Pt arrived to ED with c/o seizures this morning. Reports ran out of medication 2 days ago and this morning began to have seizure. Mom reports seizure was back to back and was able to stopped. Reports giving 2 doses of rectal diastat. Pt currently awake alert and oriented. Reports feeling "tired." Reports having a shunt. Denies any recent illness. Hx cerebral palsy.   "

## 2024-01-23 ENCOUNTER — TELEPHONE (OUTPATIENT)
Dept: NEUROLOGY | Facility: CLINIC | Age: 24
End: 2024-01-23
Payer: MEDICAID

## 2024-01-23 NOTE — TELEPHONE ENCOUNTER
----- Message from Galilea Berger MD PhD sent at 1/22/2024  9:52 AM CST -----  This patient is in the emergency room right now.  She was unable to get her medications for reasons that are not entirely clear.  Please call and set up follow-up with Usha.    Thank you,  Galilea

## 2024-01-24 ENCOUNTER — TELEPHONE (OUTPATIENT)
Dept: NEUROSURGERY | Facility: CLINIC | Age: 24
End: 2024-01-24
Payer: MEDICAID

## 2024-01-24 LAB
LACOSAMIDE: 3.7 MCG/ML (ref 1–10)
OXCARBAZEPINE METABOLITE: 19 MCG/ML (ref 10–35)

## 2024-01-24 NOTE — TELEPHONE ENCOUNTER
Spoke to pt's mom and confirmed time/date/location for annual shunt f/u w DESTINEE Tanner on 2/21.     ----- Message from Denver Nguyen sent at 1/23/2024  5:01 PM CST -----    ----- Message -----  From: Alaina Britton PA-C  Sent: 1/22/2024  10:09 AM CST  To: [facility] Staff    Good morning,     Patient is due for annual  shunt FU with Dr. Arora. Had a CT in the ED today. Can you please contact her for scheduling?     Thanks,   Alaina

## 2024-01-26 LAB — PERAMPANEL SERPL-MCNC: 146 NG/ML (ref 180–980)

## 2024-02-06 ENCOUNTER — OFFICE VISIT (OUTPATIENT)
Dept: NEUROLOGY | Facility: CLINIC | Age: 24
End: 2024-02-06
Payer: MEDICAID

## 2024-02-06 ENCOUNTER — LAB VISIT (OUTPATIENT)
Dept: LAB | Facility: HOSPITAL | Age: 24
End: 2024-02-06
Payer: MEDICAID

## 2024-02-06 VITALS
DIASTOLIC BLOOD PRESSURE: 87 MMHG | HEART RATE: 78 BPM | BODY MASS INDEX: 20.22 KG/M2 | SYSTOLIC BLOOD PRESSURE: 124 MMHG | HEIGHT: 59 IN | WEIGHT: 100.31 LBS

## 2024-02-06 DIAGNOSIS — G40.219 LOCALIZATION-RELATED (FOCAL) (PARTIAL) SYMPTOMATIC EPILEPSY AND EPILEPTIC SYNDROMES WITH COMPLEX PARTIAL SEIZURES, INTRACTABLE, WITHOUT STATUS EPILEPTICUS: ICD-10-CM

## 2024-02-06 DIAGNOSIS — Z98.2 S/P VP SHUNT: ICD-10-CM

## 2024-02-06 DIAGNOSIS — G81.14 LEFT SPASTIC HEMIPARESIS: ICD-10-CM

## 2024-02-06 DIAGNOSIS — G91.9 HYDROCEPHALUS, UNSPECIFIED TYPE: ICD-10-CM

## 2024-02-06 DIAGNOSIS — F06.30 MOOD DISORDER DUE TO MEDICAL CONDITION: ICD-10-CM

## 2024-02-06 DIAGNOSIS — G80.9 CEREBRAL PALSY, UNSPECIFIED TYPE: ICD-10-CM

## 2024-02-06 DIAGNOSIS — G40.219 LOCALIZATION-RELATED (FOCAL) (PARTIAL) SYMPTOMATIC EPILEPSY AND EPILEPTIC SYNDROMES WITH COMPLEX PARTIAL SEIZURES, INTRACTABLE, WITHOUT STATUS EPILEPTICUS: Primary | ICD-10-CM

## 2024-02-06 PROCEDURE — 99215 OFFICE O/P EST HI 40 MIN: CPT | Mod: S$PBB,,,

## 2024-02-06 PROCEDURE — 80299 QUANTITATIVE ASSAY DRUG: CPT | Mod: 91

## 2024-02-06 PROCEDURE — 99999 PR PBB SHADOW E&M-EST. PATIENT-LVL III: CPT | Mod: PBBFAC,,,

## 2024-02-06 PROCEDURE — 1159F MED LIST DOCD IN RCRD: CPT | Mod: CPTII,,,

## 2024-02-06 PROCEDURE — 80235 DRUG ASSAY LACOSAMIDE: CPT

## 2024-02-06 PROCEDURE — 99213 OFFICE O/P EST LOW 20 MIN: CPT | Mod: PBBFAC

## 2024-02-06 PROCEDURE — 3074F SYST BP LT 130 MM HG: CPT | Mod: CPTII,,,

## 2024-02-06 PROCEDURE — 3008F BODY MASS INDEX DOCD: CPT | Mod: CPTII,,,

## 2024-02-06 PROCEDURE — 80183 DRUG SCRN QUANT OXCARBAZEPIN: CPT

## 2024-02-06 PROCEDURE — 80299 QUANTITATIVE ASSAY DRUG: CPT

## 2024-02-06 PROCEDURE — 3079F DIAST BP 80-89 MM HG: CPT | Mod: CPTII,,,

## 2024-02-06 PROCEDURE — 36415 COLL VENOUS BLD VENIPUNCTURE: CPT

## 2024-02-06 NOTE — PATIENT INSTRUCTIONS
You came to [facility]psy [facility] because of your seizure disorder. Please continue the same medications at the same dose.     Do not miss any doses of medication. If a dose of medication is missed, take it as soon as it is remembered even if that means doubling up on the dose. Please get a lab test to check out the blood level of medication. Take folic acid 1mg daily. Get regular sleep. Go to sleep at the same time and wake up at the same time every day. People with epilepsy require more sleep than people without epilepsy.  Sleeping 10-12 hours a day can be normal for a person with epilepsy.  Every seizure makes it harder to prevent the next seizure. Epilepsy is associated with SUDEP, or sudden unexpected death in epilepsy.  The risk is significantly higher if convulsive seizures are not well controlled. For more information, check out these websites: https://www.epilepsy.com/, https://www.epilepsyallianceamerica.org/, www.donna-epilepsy.org, www.womenandepilepsy.org.  If you are interested in meeting other individuals in our epilepsy community, please reach out to the [facility] [place] ([phone], [phone], info@ep[place]ps[place].org).  They organize many informative and fun activities in the region.  They can provide you invaluable information on how to get access to resources available for patients living with epilepsy as well as a rich community of like-minded individuals who are all learning to cope with the same issues.  It is very important to remember, you are not alone.     Per [place] law, no episodes of loss of consciousness for 6 months before driving.  Avoid dangerous situations.  For example, no baths/pools alone, no heights, no power tools.  Wear a bike helmet.  If breakthrough seizures occur that are different in character, frequency, or duration from normal episodes, please patient portal me or call the office and we will decide the next steps. If multiple seizures occur in a row without  return back to baseline, 911 needs to be called.     Return to clinic in 1 year or sooner with issues. Please patient portal with any questions or concerns.    Stefania Franz PA-C   Neurology-Epilepsy  [facility]    Forms/Letters/Disability/DMV Paperwork: We understand the importance of filling out forms and providing letters in a timely manner.  However, many of these forms have very tricky language and once an official form is submitted as part of the medical record, it can not be modified or erased.  Please work with us in order to get these forms filled out in the most complete, accurate, and efficient way. 1.  Once you are aware that a form will need to be completed, please make an appointment.  A virtual appointment with Dr. Berger or Usha is perfectly fine. 2.  Please fill out the form as much as you can.  There are many questions that we do not have an answer for.  Please bring a blank copy of the form and your partially filled out form to the clinic visit or send them to us over the portal.  We will complete the form together with you during the clinic visit, sign it, and either return it to you or send it to the correct destination.  Every form will require an appointment however we can fill out multiple forms at once if needed.  Please do not hesitate to reach out with any questions or concerns about this policy.  We are trying to make sure that we have a system in place to meet this need which works for everyone involved.  Thank you for your understanding.

## 2024-02-06 NOTE — PROGRESS NOTES
Name: Stephany Cohn  MRN:[id number]   CSN: [id number]  Date of service: 2/6/2024  Age:23 y.o.   Gender:female   Referring Physician/Service: No referring provider defined for this encounter.   The patient is here today with: MomYusuf     Neurology Clinic: Follow Up Visit    CHIEF COMPLAINT:  Epilepsy    Interval Events/ROS 2/6/2024:    Current ASM/SEs: brivaracetam 50mg BID, perampanel 8mg qhs, oxcarbazepine 600mg BID, and lacosamide 200mg BID; no reported SE  Breakthrough seizures/events: seizure 1/22/24 in the setting of running out of medications  Driving: no  Folic acid: yes  Sleep: good  Mood: okay, intermittent low mood, sertraline 100mg qd  Activity: exercising/lifting weights at home     Otherwise, no fever, no cold symptoms, no headache, no changes in vision, no new weakness, no chest pain, no shortness of breath, no nausea, no vomiting, no diarrhea, no constipation, no tingling/numbness, no problems walking. Patient and mom have no complaints today.     Recent Labs   Lab 08/18/21  1625 08/15/22  1100 01/09/23  1640 01/24/23  1330 01/22/24  0839 01/22/24  1035   Levetiracetam Lvl 9.8 40.2  --   --   --   --    Lacosamide  --   --   --  15.0 H  --  3.7   Oxcarbazepine 27 27 26 23 19  --    Perampanel (Fycompa) Quant  --  430  --   --  146 L  --        Interval Events/ROS 2/22/2023:    Current ASM/SEs: brivaracetam 50mg BID, perampanel 8mg qhs, oxcarbazepine 600mg BID, and lacosamide 200mg BID; no reported SE  Breakthrough seizures/events: last seizure was 1/9  Folic acid: yes  Sleep: fair  Mood: fair, notes some improvement w/ sertraline 100mg daily     Dizziness has improved. Otherwise, no fever, no cold symptoms, no headache, no changes in vision, no new weakness, no chest pain, no shortness of breath, no nausea, no vomiting, no diarrhea, no constipation, no tingling/numbness, no problems walking. Patient and mom have no complaints today.      Interval Events/ROS 11/22/2022:    Current AED/SEs:  "Levetiracetam 1000mg BID (10 mL BID), Ocxarbazepine 600mg BID (10 mL BID), Parampanel 8mg qhs   Breakthrough seizures/events: yes, one breakthrough event 11/12/2022. No other seizures noted by patient.  Driving: no  Folic acid: yes  Sleep: 7-8 hours per night.   Mood: "Weird". Endorses mood is not the best currently.     Endorses "white floaters" in vision intermittently s/p event in November. Otherwise, no fever, no cold symptoms, no headache, no new weakness, no chest pain, no shortness of breath, no tingling/numbness, and no problems walking.     Interval Events/ROS 8/15/2022:    Accompanied by mom who also contributes to the history. One breakthrough event 7/6/2022 in the setting of missed medication/trouble obtaining refill from pharmacy. Current ASM regimen: levetiracetam 1000 mg twice daily, oxcarbazepine 600 mg twice daily, and perampanel 8 mg. Folic acid 1 mg daily. Prior to this, last seizure was 11/2021 after shunt revision. Gradually worsening agitation/irritability. Patient notes experiencing anxiety and depression. Passive thoughts of harming herself, no plan. No active SI or HI. Sleeping okay, has intermittent difficulty falling and staying asleep. Otherwise, no fever, no cold symptoms, no current headache, no changes in vision, no new weakness, no chest pain, no shortness of breath, no nausea, no vomiting, no diarrhea, no constipation, no tingling/numbness.    HPI 8/18/2021:     Age of first seizure:  First seizure was a febrile seizure as an infant, <2yo   Seizure Risk Factors:  Cerebral palsy, hydrocephalus,  shunt, no family history of seizures, no head strike with LOC, during birth -> ?brain bleed, premie at 1lb 7oz, csection for unclear reasons, was in incubator ?intubation, stayed a couple of months in the hospital before being able to go home (needed to be >5lb), initial shunt placed at 2yo with infection, revision at 3yo, no other CNS infections    Time of Last Seizure: 8/14/2021 (and then " again walking out of clinic)  # of lifetime Seizures:  ?30 lifetime seizures   Frequency of Seizures: at most, 2 in one day, currently about 3-4 per year, seizure free for a couple of years seizure restarted when weaning off keppra   Seizure Triggers: decreased meds, poor sleep   Injuries/Hospitalization for seizures?  No injuries, when seizures started ED x5, then not as frequently because would use Diastat, 3 years since going to the ED for seizures  Pregnancy? Not sexually active, not interested in pregnancy   Contraception? no  Folic Acid? No   Bone Health:  No dexa      Auras: will sit down and tell mom that she is about to have a seizure     Seizure Events:   1. Previously, before this medication combo, start with left arm/leg jerking, eye darting to the side, able to talk through seizures, will have a hard time with vision, say she can not see, exhausted afterwards, no urinary incontinence, no tongue bite   2. Currently, comes out of nowhere, eyes go to back of head, left arm/leg twitching, no loss of consciousness, tired afterwards      Current AED/SEs:  1. Levetiracetam: solution currently -> 400mg/400mg/500mg at night   2. Oxcarbazepine 600mg twice daily SE none   3. Perampanel 4mg nightly   4. Rectal diazepam -> last used about a year ago     Previous AED/SEs or reason for DC.   1. Mom knows she has been on lots of medications but does not remember which ones      EEG: in the past -> results unclear     CT/MRI: no images currently. Not in at least 4 years.     AED compliance, adherence: no missed medications       ROS 8/18/2021:  Left spastic hemiparesis, cerebral palsy, hydrocephalus.  Frequent headaches. Sometimes will say shunt is hurting. Nauseated. Just moved back from [place] -> moved there after [place], just moved back. Will get snappy and mean when she feels bad. No migraines in the family. Frontal and posterior headaches with photophobia, vertigo, N/V. Does not seem positional. N/V currently  "today, + headache, double vision with vomiting, limp, left side jerking, rare trips over the left side, usually wears a brace on left home, sit at home, watches TV. 10 headaches in the last month.  Otherwise denies poor vision at this time, no language difficulties, no difficulties with math, no cough or shortness of breath.  No chest pain.  No diarrhea or constipation.      EXAM:   - Vitals: /87   Pulse 78   Ht 4' 11" (1.499 m)   Wt 45.5 kg (100 lb 5 oz)   BMI 20.26 kg/m²    - General: Awake and alert, NAD  - HEENT: NC/AT  - Neck:  Decreased range of motion  - Pulmonary: no increased WOB  - Cardiac: well perfused   - Abdomen: soft, nontender, nondistended  - Extremities:  Left arm/hand atrophic  - Skin: no rashes or lesions noted.     NEURO EXAM:   - Mental Status: Awake, alert, oriented x 3. Looks to mom for most details of the history but does provide some answers that mom does not know.  Language is simple words and phrases with intact repetition and comprehension.  Somewhat hesitant prosody. There were no paraphasic errors. Speech was not dysarthric.     - Cranial Nerves:   EOMI.  Activates somewhat faster on the right. Hearing intact to room voice. 5/5 strength in trapezii.  Does not lift up left shoulder. Tongue protrudes in midline and to either side with no evidence of atrophy or weakness.    - Motor:  Decreased bulk with increased left-sided tone.  Left pronation.     Delt Bic Tri WrE WrF  FFl FE IO IP Quad Ham TA Gastroc    R   5     5    5    5    5        5   5    5   5    5        5     5      5          L   4      5    4   3    5-        5    4   2    4    5       5     4      5         - Sensory: No deficits to light touch. No extinction to DSS.  - Coordination: No dysmetria on FNF  - Gait:  Hemiparetic gait with left limp, good balance     PLAN:   Stephany Cohn is a 23-year-old woman born extremely premature with ?  IPH with cerebral palsy, hydrocephalus requiring a  shunt, " and refractory epilepsy. Seizures are currently controlled on regimen of brivaracetam 50mg BID, perampanel 8mg qhs, oxcarbazepine 600mg BID, and lacosamide 200mg BID. Continue medications at same doses, refills provided. Levels. Advised to reach out over the portal with any issues. Patient and mom are comfortable with plan. All questions and concerns are addressed at this time. Follow up in 1 year.     Patient Instructions   You came to Epilepsy Clinic because of your seizure disorder. Please continue the same medications at the same dose.     Do not miss any doses of medication. If a dose of medication is missed, take it as soon as it is remembered even if that means doubling up on the dose. Please get a lab test to check out the blood level of medication. Take folic acid 1mg daily. Get regular sleep. Go to sleep at the same time and wake up at the same time every day. People with epilepsy require more sleep than people without epilepsy.  Sleeping 10-12 hours a day can be normal for a person with epilepsy.  Every seizure makes it harder to prevent the next seizure. Epilepsy is associated with SUDEP, or sudden unexpected death in epilepsy.  The risk is significantly higher if convulsive seizures are not well controlled. For more information, check out these websites: https://www.epilepsy.com/, https://www.epilepsyalliance[place].org/, www.donna-epilepsy.org, www.womenandepilepsy.org.  If you are interested in meeting other individuals in our epilepsy community, please reach out to the [facility] [place] ([phone], [phone], info@ep[place]ps[place].org).  They organize many informative and fun activities in the region.  They can provide you invaluable information on how to get access to resources available for patients living with epilepsy as well as a rich community of like-minded individuals who are all learning to cope with the same issues.  It is very important to remember, you are not alone.      Per [place] law, no episodes of loss of consciousness for 6 months before driving.  Avoid dangerous situations.  For example, no baths/pools alone, no heights, no power tools.  Wear a bike helmet.  If breakthrough seizures occur that are different in character, frequency, or duration from normal episodes, please patient portal me or call the office and we will decide the next steps. If multiple seizures occur in a row without return back to baseline, 911 needs to be called.     Return to clinic in 1 year or sooner with issues. Please patient portal with any questions or concerns.    Stefania Franz PA-C   Neurology-Epilepsy  [facility]    Forms/Letters/Disability/DMV Paperwork: We understand the importance of filling out forms and providing letters in a timely manner.  However, many of these forms have very tricky language and once an official form is submitted as part of the medical record, it can not be modified or erased.  Please work with us in order to get these forms filled out in the most complete, accurate, and efficient way. 1.  Once you are aware that a form will need to be completed, please make an appointment.  A virtual appointment with Dr. Berger or Usha is perfectly fine. 2.  Please fill out the form as much as you can.  There are many questions that we do not have an answer for.  Please bring a blank copy of the form and your partially filled out form to the clinic visit or send them to us over the portal.  We will complete the form together with you during the clinic visit, sign it, and either return it to you or send it to the correct destination.  Every form will require an appointment however we can fill out multiple forms at once if needed.  Please do not hesitate to reach out with any questions or concerns about this policy.  We are trying to make sure that we have a system in place to meet this need which works for everyone involved.  Thank you for your  understanding.       Problem List Items Addressed This Visit          Neuro    Cerebral palsy    Hydrocephalus    Left spastic hemiparesis    Localization-related (focal) (partial) symptomatic epilepsy and epileptic syndromes with complex partial seizures, intractable, without status epilepticus - Primary    Relevant Medications    brivaracetam (BRIVIACT) 50 mg Tab    folic acid (FOLVITE) 1 MG tablet    lacosamide (VIMPAT) 200 mg Tab tablet    OXcarbazepine (TRILEPTAL) 300 mg/5 mL (60 mg/mL) Susp    perampaneL (FYCOMPA) 8 mg tablet    Other Relevant Orders    Brivaracetam, Plasma (Completed)    Lacosamide (Vimpat) (Completed)    Oxcarbazepine level (Completed)    S/P  shunt     Other Visit Diagnoses       Mood disorder due to medical condition        Relevant Medications    sertraline (ZOLOFT) 100 MG tablet          Disclaimer: This note has been generated using voice-recognition software. There may be typographical errors that were missed during proof-reading.     LABS:  Recent Labs   Lab 11/20/21  0901 11/24/21  0809 01/09/23  1640 01/22/24  0839   WBC  --  10.13 10.05 6.71   Hemoglobin  --  10.6 L 9.7 L 9.0 L   Hematocrit  --  32.9 L 30.2 L 29.1 L   Platelets  --  325 321 294   Sodium 141 137 143 139   Potassium 3.7 3.5 3.7 3.9   BUN 10 11 8 10   Creatinine 0.7 0.8 0.8 0.8   eGFR if African American >60.0 >60.0  --   --    eGFR if non African American >60.0 >60.0  --   --        Recent Labs   Lab 08/18/21  1625 08/15/22  1100 01/09/23  1640 01/24/23  1330 01/22/24  0839 01/22/24  1035   Levetiracetam Lvl 9.8 40.2  --   --   --   --    Lacosamide  --   --   --  15.0 H  --  3.7   Oxcarbazepine 27 27 26 23 19  --    Perampanel (Fycompa) Quant  --  430  --   --  146 L  --         IMAGING:  Recent imaging is personally reviewed with the patient.    None in the system    PAST MEDICAL HISTORY:   Active Ambulatory Problems     Diagnosis Date Noted    Cerebral palsy     Hydrocephalus     Epilepsy     Left spastic  hemiparesis     Localization-related (focal) (partial) symptomatic epilepsy and epileptic syndromes with complex partial seizures, intractable, without status epilepticus 08/18/2021    Intractable migraine with aura with status migrainosus 08/18/2021    S/P  shunt 08/18/2021    Decreased strength, endurance, and mobility 11/29/2021    Gait abnormality 11/29/2021    Balance problems 11/29/2021    Hypertension, essential, benign 11/30/2021    Left hand weakness 12/07/2021     Resolved Ambulatory Problems     Diagnosis Date Noted    No Resolved Ambulatory Problems     No Additional Past Medical History        PAST SURGICAL HISTORY:   Past Surgical History:   Procedure Laterality Date    REVISION OF VENTRICULOPERITONEAL SHUNT  2005    REVISION OF VENTRICULOPERITONEAL SHUNT Right 11/19/2021    Procedure: REVISION, SHUNT, VENTRICULOPERITONEAL;  Surgeon: Lien Arora MD;  Location: [facility] OR [address] [place] [address];  Service: Neurosurgery;  Laterality: Right;  proximal    REVISION OF VENTRICULOPERITONEAL SHUNT Right 11/23/2021    Procedure: REVISION, SHUNT, VENTRICULOPERITONEAL;  Surgeon: Lien Arora MD;  Location: [facility] OR [address] [place] [address];  Service: Neurosurgery;  Laterality: Right;    VENTRICULOPERITONEAL SHUNT Right     approx when pt. was 2 y/o then revised in jan. 2005        ALLERGIES: Sulfur and Sulfa (sulfonamide antibiotics)   CURRENT MEDICATIONS:   Current Outpatient Medications   Medication Sig Dispense Refill    brivaracetam (BRIVIACT) 50 mg Tab Take 1 tablet (50 mg total) by mouth 2 (two) times daily. 60 tablet 0    diazePAM (VALTOCO) 10 mg/spray (0.1 mL) Spry 1 spray by Nasal route as needed (for a seizure lasting more than 3 minutes or more than 3 seizures in an hour). 2 each 5    diazePAM 5-7.5-10 mg (DIASTAT ACUDIAL) 5-7.5-10 mg Kit rectal kit Place 10 mg rectally as needed (for seizures). 2 kit 5    folic acid (FOLVITE) 1 MG tablet Take 1 tablet (1 mg total) by mouth once daily. 90 tablet 3    lacosamide (VIMPAT) 200 mg  Tab tablet Take 1 tablet (200 mg total) by mouth every 12 (twelve) hours. 60 tablet 5    lisinopriL 10 MG tablet Take 1 tablet (10 mg total) by mouth once daily. 90 tablet 3    OXcarbazepine (TRILEPTAL) 300 mg/5 mL (60 mg/mL) Susp Take 10 mL by mouth 2 (two) times daily. 1800 mL 3    perampaneL (FYCOMPA) 8 mg tablet Take 1 tablet (8 mg total) by mouth every evening. 30 tablet 5    sertraline (ZOLOFT) 100 MG tablet Take 1 tablet (100 mg total) by mouth once daily. 90 tablet 3    vitamin D (VITAMIN D3) 1000 units Tab Take 4,000 Units by mouth once daily.       No current facility-administered medications for this visit.        FAMILY HISTORY:   Family History   Problem Relation Age of Onset    Hypertension Mother     Other Mother         HPV    Arthritis Mother     Hypertension Father     Hypertension Brother     Cancer Maternal Aunt          SOCIAL HISTORY:   Social History     Socioeconomic History    Marital status: Single   Tobacco Use    Smoking status: Never    Smokeless tobacco: Never   Substance and Sexual Activity    Alcohol use: Never    Drug use: Never    Sexual activity: Never     Social Determinants of Health     Physical Activity: Inactive (3/16/2021)    Exercise Vital Sign     Days of Exercise per Week: 0 days     Minutes of Exercise per Session: 0 min      Questions and concerns raised by the patient and family/care-giver(s) were addressed and they indicated understanding of everything discussed and agreed to plans as above.    Stefania Franz PA-C   Neurology-Epilepsy  [facility]    Collaborating physician, Dr. Galilea Berger, was available during today's encounter.     I spent approximately 40 minutes on the day of this encounter preparing to see the patient, obtaining and reviewing history and results, performing a medically appropriate exam, counseling and educating the patient/family/caregiver, documenting clinical information, coordinating care, and ordering medications,  tests, procedures, and referrals.

## 2024-02-08 LAB
LACOSAMIDE: 7.2 MCG/ML (ref 1–10)
OXCARBAZEPINE METABOLITE: 26 MCG/ML (ref 10–35)
PERAMPANEL SERPL-MCNC: 319 NG/ML (ref 180–980)

## 2024-02-12 LAB — BRIVARACETAM SERPL-MCNC: 1.4 MCG/ML

## 2024-02-14 RX ORDER — SERTRALINE HYDROCHLORIDE 100 MG/1
100 TABLET, FILM COATED ORAL DAILY
Qty: 90 TABLET | Refills: 3 | Status: SHIPPED | OUTPATIENT
Start: 2024-02-14 | End: 2025-02-13

## 2024-02-14 RX ORDER — LACOSAMIDE 200 MG/1
200 TABLET ORAL EVERY 12 HOURS
Qty: 60 TABLET | Refills: 5 | Status: SHIPPED | OUTPATIENT
Start: 2024-02-14 | End: 2024-08-12

## 2024-02-14 RX ORDER — FOLIC ACID 1 MG/1
1 TABLET ORAL DAILY
Qty: 90 TABLET | Refills: 3 | Status: SHIPPED | OUTPATIENT
Start: 2024-02-14 | End: 2025-02-13

## 2024-02-14 RX ORDER — OXCARBAZEPINE 60 MG/ML
600 SUSPENSION ORAL 2 TIMES DAILY
Qty: 1800 ML | Refills: 3 | Status: SHIPPED | OUTPATIENT
Start: 2024-02-14 | End: 2025-02-14

## 2024-02-21 ENCOUNTER — OFFICE VISIT (OUTPATIENT)
Dept: NEUROSURGERY | Facility: CLINIC | Age: 24
End: 2024-02-21
Payer: MEDICAID

## 2024-02-21 DIAGNOSIS — Z98.2 S/P VP SHUNT: ICD-10-CM

## 2024-02-21 DIAGNOSIS — G91.9 HYDROCEPHALUS, UNSPECIFIED TYPE: Primary | ICD-10-CM

## 2024-02-21 PROCEDURE — 99213 OFFICE O/P EST LOW 20 MIN: CPT | Mod: PBBFAC | Performed by: PHYSICIAN ASSISTANT

## 2024-02-21 PROCEDURE — 1159F MED LIST DOCD IN RCRD: CPT | Mod: CPTII,,, | Performed by: PHYSICIAN ASSISTANT

## 2024-02-21 PROCEDURE — 99214 OFFICE O/P EST MOD 30 MIN: CPT | Mod: S$PBB,,, | Performed by: PHYSICIAN ASSISTANT

## 2024-02-21 PROCEDURE — 99999 PR PBB SHADOW E&M-EST. PATIENT-LVL III: CPT | Mod: PBBFAC,,, | Performed by: PHYSICIAN ASSISTANT

## 2024-03-01 NOTE — PROGRESS NOTES
Neurosurgery  Established Patient    SUBJECTIVE:     History of Present Illness:  23-year-old female with history of epilepsy and hydrocephalus status post right parieto-occipital shunt last revised 2021 who presents for annual follow-up.  She was last evaluated March of 2023 and was doing well at that time without signs of shunt failure.  Today she denies any new complaints including headaches, vision changes, lethargy, nausea/vomiting or focal neurologic deficits.  She denies any change in her seizures.  Denies fevers, chills.    Review of patient's allergies indicates:   Allergen Reactions    Sulfur Hives    Sulfa (sulfonamide antibiotics)        Current Outpatient Medications   Medication Sig Dispense Refill    brivaracetam (BRIVIACT) 50 mg Tab Take 1 tablet (50 mg total) by mouth 2 (two) times daily. 60 tablet 5    diazePAM (VALTOCO) 10 mg/spray (0.1 mL) Spry 1 spray by Nasal route as needed (for a seizure lasting more than 3 minutes or more than 3 seizures in an hour). 2 each 5    diazePAM 5-7.5-10 mg (DIASTAT ACUDIAL) 5-7.5-10 mg Kit rectal kit Place 10 mg rectally as needed (for seizures). 2 kit 5    folic acid (FOLVITE) 1 MG tablet Take 1 tablet (1 mg total) by mouth once daily. 90 tablet 3    lacosamide (VIMPAT) 200 mg Tab tablet Take 1 tablet (200 mg total) by mouth every 12 (twelve) hours. 60 tablet 5    lisinopriL 10 MG tablet Take 1 tablet (10 mg total) by mouth once daily. 90 tablet 3    OXcarbazepine (TRILEPTAL) 300 mg/5 mL (60 mg/mL) Susp Take 10 mLs (600 mg total) by mouth 2 (two) times daily. 1800 mL 3    perampaneL (FYCOMPA) 8 mg tablet Take 1 tablet (8 mg total) by mouth every evening. 30 tablet 5    sertraline (ZOLOFT) 100 MG tablet Take 1 tablet (100 mg total) by mouth once daily. 90 tablet 3    vitamin D (VITAMIN D3) 1000 units Tab Take 4,000 Units by mouth once daily.       No current facility-administered medications for this visit.       Past Medical History:   Diagnosis Date    Cerebral  palsy     Epilepsy     Hydrocephalus     Left spastic hemiparesis      Past Surgical History:   Procedure Laterality Date    REVISION OF VENTRICULOPERITONEAL SHUNT  2005    REVISION OF VENTRICULOPERITONEAL SHUNT Right 11/19/2021    Procedure: REVISION, SHUNT, VENTRICULOPERITONEAL;  Surgeon: Lien Arora MD;  Location: [facility] OR 2ND FLR;  Service: Neurosurgery;  Laterality: Right;  proximal    REVISION OF VENTRICULOPERITONEAL SHUNT Right 11/23/2021    Procedure: REVISION, SHUNT, VENTRICULOPERITONEAL;  Surgeon: Lien Arora MD;  Location: [facility] OR 2ND FLR;  Service: Neurosurgery;  Laterality: Right;    VENTRICULOPERITONEAL SHUNT Right     approx when pt. was 1 y/o then revised in jan. 2005     Family History       Problem Relation (Age of Onset)    Arthritis Mother    Cancer Maternal Aunt    Hypertension Mother, Father, Brother    Other Mother          Social History     Socioeconomic History    Marital status: Single   Tobacco Use    Smoking status: Never    Smokeless tobacco: Never   Substance and Sexual Activity    Alcohol use: Never    Drug use: Never    Sexual activity: Never     Social Determinants of Health     Physical Activity: Inactive (3/16/2021)    Exercise Vital Sign     Days of Exercise per Week: 0 days     Minutes of Exercise per Session: 0 min       Review of Systems    OBJECTIVE:     Vital Signs  Pain Score: 0-No pain  There is no height or weight on file to calculate BMI.    Neurosurgery Physical Exam  General: No apparent distress  Head: normocephalic, atraumatic, no fluid along shunt tubing, shunt pumps & refills easily  Neurologic: alert & oriented, responds appropriately and FCx4  Cranial nerves: face symmetric, tongue midline, CN II-XII grossly intact.   Eyes: pupils equal, round, reactive to light with accomodation, EOMI.   Pulmonary: no signs of respiratory distress, symmetric expansion  Skin: Skin is cool, dry and intact.  Sensory: intact to light touch throughout  Motor Strength:  chronic left hemiparesis- moves against gravity    Diagnostic Results:  CT head without contrast and x-ray shunt series dated 01/22/2024 were reviewed and shows similar prominent ventricular system and stable positioning of right parietal shunt catheter.  There was some hypoattenuation on the left cerebral convexity however low suspicion for subdural hemorrhage, more likely artifact or prominent cortical vein.    ASSESSMENT/PLAN:     23-year-old female with history of epilepsy and hydrocephalus status post  shunt (strata II 1.5) who presents today for annual shunt evaluation.  She does not have any signs or symptoms of shunt failure or infection.  Strata valve was confirmed at 1.5.  We will continue annual follow up with surveillance MRI shunt check and shunt series.  She was encouraged to call us sooner with any questions or new concerns.      Flores Chang PA-C  Neurosurgery  [phone]          Note dictated with voice recognition software, please excuse any grammatical errors.

## 2024-04-11 DIAGNOSIS — G40.211 LOCALIZATION-RELATED (FOCAL) (PARTIAL) SYMPTOMATIC EPILEPSY AND EPILEPTIC SYNDROMES WITH COMPLEX PARTIAL SEIZURES, INTRACTABLE, WITH STATUS EPILEPTICUS: ICD-10-CM

## 2024-04-11 RX ORDER — DIAZEPAM 10 MG/100UL
1 SPRAY NASAL
Qty: 2 EACH | Refills: 5 | Status: CANCELLED | OUTPATIENT
Start: 2024-04-11

## 2024-04-11 RX ORDER — DIAZEPAM 10 MG/100UL
1 SPRAY NASAL
Qty: 2 EACH | Refills: 5 | Status: SHIPPED | OUTPATIENT
Start: 2024-04-11

## 2024-04-11 RX ORDER — DIAZEPAM 10 MG/2G
10 GEL RECTAL
Qty: 2 KIT | Refills: 5 | Status: SHIPPED | OUTPATIENT
Start: 2024-04-11

## 2024-04-11 NOTE — TELEPHONE ENCOUNTER
Rectal diazepam as needed to stop breakthrough prolonged and cluster seizures    Galilea Berger MD PhD [facility]  Neurology-Epilepsy  [facility].

## 2024-04-11 NOTE — TELEPHONE ENCOUNTER
Rescue diazepam nasal spray as needed for breakthrough prolonged and cluster seizures    Galilea Berger MD PhD [facility]  Neurology-Epilepsy  [facility].

## 2024-05-22 ENCOUNTER — OFFICE VISIT (OUTPATIENT)
Facility: CLINIC | Age: 24
End: 2024-05-22
Payer: MEDICAID

## 2024-05-22 VITALS
HEIGHT: 59 IN | DIASTOLIC BLOOD PRESSURE: 80 MMHG | HEART RATE: 87 BPM | TEMPERATURE: 98 F | WEIGHT: 101.06 LBS | BODY MASS INDEX: 20.37 KG/M2 | RESPIRATION RATE: 18 BRPM | OXYGEN SATURATION: 99 % | SYSTOLIC BLOOD PRESSURE: 100 MMHG

## 2024-05-22 DIAGNOSIS — M94.0 COSTOCHONDRITIS: Primary | ICD-10-CM

## 2024-05-22 DIAGNOSIS — J45.990 EXERCISE-INDUCED ASTHMA: ICD-10-CM

## 2024-05-22 DIAGNOSIS — I10 HYPERTENSION, ESSENTIAL, BENIGN: ICD-10-CM

## 2024-05-22 PROCEDURE — 99214 OFFICE O/P EST MOD 30 MIN: CPT | Mod: PBBFAC,PN | Performed by: STUDENT IN AN ORGANIZED HEALTH CARE EDUCATION/TRAINING PROGRAM

## 2024-05-22 PROCEDURE — 99999 PR PBB SHADOW E&M-EST. PATIENT-LVL IV: CPT | Mod: PBBFAC,,, | Performed by: STUDENT IN AN ORGANIZED HEALTH CARE EDUCATION/TRAINING PROGRAM

## 2024-05-22 PROCEDURE — 4010F ACE/ARB THERAPY RXD/TAKEN: CPT | Mod: CPTII,,, | Performed by: STUDENT IN AN ORGANIZED HEALTH CARE EDUCATION/TRAINING PROGRAM

## 2024-05-22 PROCEDURE — 3008F BODY MASS INDEX DOCD: CPT | Mod: CPTII,,, | Performed by: STUDENT IN AN ORGANIZED HEALTH CARE EDUCATION/TRAINING PROGRAM

## 2024-05-22 PROCEDURE — 1159F MED LIST DOCD IN RCRD: CPT | Mod: CPTII,,, | Performed by: STUDENT IN AN ORGANIZED HEALTH CARE EDUCATION/TRAINING PROGRAM

## 2024-05-22 PROCEDURE — 3074F SYST BP LT 130 MM HG: CPT | Mod: CPTII,,, | Performed by: STUDENT IN AN ORGANIZED HEALTH CARE EDUCATION/TRAINING PROGRAM

## 2024-05-22 PROCEDURE — 99214 OFFICE O/P EST MOD 30 MIN: CPT | Mod: S$PBB,,, | Performed by: STUDENT IN AN ORGANIZED HEALTH CARE EDUCATION/TRAINING PROGRAM

## 2024-05-22 PROCEDURE — 3079F DIAST BP 80-89 MM HG: CPT | Mod: CPTII,,, | Performed by: STUDENT IN AN ORGANIZED HEALTH CARE EDUCATION/TRAINING PROGRAM

## 2024-05-22 RX ORDER — LISINOPRIL 5 MG/1
5 TABLET ORAL DAILY
Qty: 90 TABLET | Refills: 3 | Status: SHIPPED | OUTPATIENT
Start: 2024-05-22 | End: 2025-05-22

## 2024-05-22 RX ORDER — LISINOPRIL 10 MG/1
10 TABLET ORAL DAILY
Qty: 90 TABLET | Refills: 3 | Status: SHIPPED | OUTPATIENT
Start: 2024-05-22 | End: 2024-05-22

## 2024-05-22 RX ORDER — ALBUTEROL SULFATE 90 UG/1
2 AEROSOL, METERED RESPIRATORY (INHALATION) EVERY 6 HOURS PRN
Qty: 18 G | Refills: 0 | Status: SHIPPED | OUTPATIENT
Start: 2024-05-22 | End: 2024-06-14

## 2024-05-22 NOTE — PROGRESS NOTES
SUBJECTIVE     Chief Complaint   Patient presents with    Medication Refill     Pt states she is coming in for med refill       HPI  Stephany Cohn is a 22 y.o. female with multiple medical diagnoses as listed in the medical history and problem list that presents for follow-up for HTN and c/o chest pain.    Pt c/o chest pain today. This has happened 1-2 times and lasts for a few seconds and then abates. It is associated with SOB, worse with deep inspiration. No nausea nor diaphoresis. Associated with exercise. Does have some palpitations but only once.     Pt has been doing okay since her last visit. She is fully compliant with meds and denies any adverse side effects. Pt reports she is starting to try to eat healthier. She is exercising doing pilates, yoga. Pt presents for med refills today and is without any other complaints.     PAST MEDICAL HISTORY:  Past Medical History:   Diagnosis Date    Cerebral palsy     Epilepsy     Hydrocephalus     Left spastic hemiparesis        ALLERGIES AND MEDICATIONS: updated and reviewed.  Review of patient's allergies indicates:   Allergen Reactions    Sulfur Hives    Sulfa (sulfonamide antibiotics)      Current Outpatient Medications   Medication Sig Dispense Refill    brivaracetam (BRIVIACT) 50 mg Tab Take 1 tablet (50 mg total) by mouth 2 (two) times daily. 60 tablet 5    diazePAM (VALTOCO) 10 mg/spray (0.1 mL) Spry 1 spray by Nasal route as needed (for a seizure lasting more than 3 minutes or more than 3 seizures in an hour). 2 each 5    diazePAM 5-7.5-10 mg (DIASTAT ACUDIAL) 5-7.5-10 mg Kit rectal kit Place 10 mg rectally as needed (for seizures). 2 kit 5    folic acid (FOLVITE) 1 MG tablet Take 1 tablet (1 mg total) by mouth once daily. 90 tablet 3    lacosamide (VIMPAT) 200 mg Tab tablet Take 1 tablet (200 mg total) by mouth every 12 (twelve) hours. 60 tablet 5    OXcarbazepine (TRILEPTAL) 300 mg/5 mL (60 mg/mL) Susp Take 10 mLs (600 mg total) by mouth 2 (two) times  "daily. 1800 mL 3    perampaneL (FYCOMPA) 8 mg tablet Take 1 tablet (8 mg total) by mouth every evening. 30 tablet 5    sertraline (ZOLOFT) 100 MG tablet Take 1 tablet (100 mg total) by mouth once daily. 90 tablet 3    vitamin D (VITAMIN D3) 1000 units Tab Take 4,000 Units by mouth once daily.      albuterol (VENTOLIN HFA) 90 mcg/actuation inhaler Inhale 2 puffs into the lungs every 6 (six) hours as needed for Wheezing. Rescue 18 g 0    lisinopriL (PRINIVIL,ZESTRIL) 5 MG tablet Take 1 tablet (5 mg total) by mouth once daily. 90 tablet 3     No current facility-administered medications for this visit.       ROS  Review of Systems   Constitutional:  Negative for chills, fatigue and fever.   HENT:  Negative for rhinorrhea and sore throat.    Respiratory:  Positive for shortness of breath. Negative for cough.    Cardiovascular:  Positive for chest pain. Negative for palpitations.   Gastrointestinal:  Negative for constipation, diarrhea, nausea and vomiting.   Genitourinary:  Negative for dysuria.   Musculoskeletal:  Negative for joint swelling.   Skin:  Negative for rash and wound.   Neurological:  Negative for light-headedness and headaches.   Psychiatric/Behavioral:  Negative for dysphoric mood and sleep disturbance. The patient is not nervous/anxious.          OBJECTIVE     Physical Exam  Vitals:    05/22/24 1306   BP: 100/80   Pulse: 87   Resp: 18   Temp: 98.2 °F (36.8 °C)    Body mass index is 20.42 kg/m².  Weight: 45.8 kg (101 lb 1.3 oz)   Height: 4' 11" (149.9 cm)     Physical Exam  Vitals reviewed.   Constitutional:       General: She is not in acute distress.  HENT:      Right Ear: External ear normal.      Left Ear: External ear normal.      Nose: Nose normal.      Mouth/Throat:      Mouth: Mucous membranes are moist.   Eyes:      Extraocular Movements: Extraocular movements intact.      Conjunctiva/sclera: Conjunctivae normal.      Pupils: Pupils are equal, round, and reactive to light.   Pulmonary:      " Effort: Pulmonary effort is normal.   Abdominal:      General: There is no distension.      Palpations: Abdomen is soft.   Musculoskeletal:         General: No swelling. Normal range of motion.      Cervical back: Normal range of motion.   Skin:     General: Skin is warm and dry.      Findings: No rash.   Neurological:      General: No focal deficit present.      Mental Status: She is alert and oriented to person, place, and time.   Psychiatric:         Mood and Affect: Mood normal.         Behavior: Behavior normal.           Health Maintenance         Date Due Completion Date    Lipid Panel Never done ---    HPV Vaccines (3 - 3-dose series) 02/17/2017 10/26/2016    Pap Smear Never done ---    COVID-19 Vaccine (3 - 2023-24 season) 09/01/2023 12/21/2021    Influenza Vaccine (Season Ended) 09/01/2024 ---    TETANUS VACCINE 08/17/2026 8/17/2016              ASSESSMENT     24 y.o. female with     1. Costochondritis    2. Hypertension, essential, benign    3. Exercise-induced asthma        PLAN:     1. Hypertension, essential, benign  - BP low today. Will decrease lisinopril to 5mg daily. F/u 3 months.  - lisinopriL (PRINIVIL,ZESTRIL) 5 MG tablet; Take 1 tablet (5 mg total) by mouth once daily.  Dispense: 90 tablet; Refill: 3    2. Costochondritis  - New. Start motrin PRN and stretching exercises. F/u 3 months.    3. Exercise-induced asthma  - New. Use albuterol prior to exercise. F/u 3 months.  - albuterol (VENTOLIN HFA) 90 mcg/actuation inhaler; Inhale 2 puffs into the lungs every 6 (six) hours as needed for Wheezing. Rescue  Dispense: 18 g; Refill: 0        Gabby Graham MD  05/23/2024 1:12 PM        Follow up in about 3 months (around 8/22/2024).

## 2024-06-13 DIAGNOSIS — J45.990 EXERCISE-INDUCED ASTHMA: ICD-10-CM

## 2024-06-14 RX ORDER — ALBUTEROL SULFATE 90 UG/1
2 AEROSOL, METERED RESPIRATORY (INHALATION) EVERY 6 HOURS PRN
Qty: 8.5 G | Refills: 5 | Status: SHIPPED | OUTPATIENT
Start: 2024-06-14

## 2024-08-22 ENCOUNTER — TELEPHONE (OUTPATIENT)
Dept: NEUROSURGERY | Facility: CLINIC | Age: 24
End: 2024-08-22
Payer: MEDICAID

## 2024-08-22 ENCOUNTER — OFFICE VISIT (OUTPATIENT)
Facility: CLINIC | Age: 24
End: 2024-08-22
Payer: MEDICAID

## 2024-08-22 ENCOUNTER — LAB VISIT (OUTPATIENT)
Dept: LAB | Facility: HOSPITAL | Age: 24
End: 2024-08-22
Attending: STUDENT IN AN ORGANIZED HEALTH CARE EDUCATION/TRAINING PROGRAM
Payer: MEDICAID

## 2024-08-22 ENCOUNTER — TELEPHONE (OUTPATIENT)
Dept: NEUROLOGY | Facility: CLINIC | Age: 24
End: 2024-08-22
Payer: MEDICAID

## 2024-08-22 VITALS
HEIGHT: 59 IN | BODY MASS INDEX: 20.37 KG/M2 | HEART RATE: 97 BPM | RESPIRATION RATE: 18 BRPM | DIASTOLIC BLOOD PRESSURE: 66 MMHG | SYSTOLIC BLOOD PRESSURE: 124 MMHG | WEIGHT: 101.06 LBS | OXYGEN SATURATION: 99 % | TEMPERATURE: 99 F

## 2024-08-22 DIAGNOSIS — I10 HYPERTENSION, ESSENTIAL, BENIGN: Primary | ICD-10-CM

## 2024-08-22 DIAGNOSIS — I10 HYPERTENSION, ESSENTIAL, BENIGN: ICD-10-CM

## 2024-08-22 DIAGNOSIS — G80.2 SPASTIC HEMIPLEGIC CEREBRAL PALSY: ICD-10-CM

## 2024-08-22 DIAGNOSIS — J45.990 EXERCISE-INDUCED ASTHMA: ICD-10-CM

## 2024-08-22 PROBLEM — G44.229 CHRONIC TENSION-TYPE HEADACHE, NOT INTRACTABLE: Status: ACTIVE | Noted: 2019-04-09

## 2024-08-22 PROBLEM — L68.0 HIRSUTISM: Status: ACTIVE | Noted: 2019-08-21

## 2024-08-22 PROBLEM — E55.9 VITAMIN D DEFICIENCY: Status: ACTIVE | Noted: 2019-04-09

## 2024-08-22 LAB
ALBUMIN SERPL BCP-MCNC: 4.1 G/DL (ref 3.5–5.2)
ALP SERPL-CCNC: 48 U/L (ref 55–135)
ALT SERPL W/O P-5'-P-CCNC: 9 U/L (ref 10–44)
ANION GAP SERPL CALC-SCNC: 8 MMOL/L (ref 8–16)
AST SERPL-CCNC: 12 U/L (ref 10–40)
BASOPHILS # BLD AUTO: 0.03 K/UL (ref 0–0.2)
BASOPHILS NFR BLD: 0.5 % (ref 0–1.9)
BILIRUB SERPL-MCNC: 0.2 MG/DL (ref 0.1–1)
BUN SERPL-MCNC: 9 MG/DL (ref 6–20)
CALCIUM SERPL-MCNC: 9.5 MG/DL (ref 8.7–10.5)
CHLORIDE SERPL-SCNC: 109 MMOL/L (ref 95–110)
CHOLEST SERPL-MCNC: 164 MG/DL (ref 120–199)
CHOLEST/HDLC SERPL: 2.1 {RATIO} (ref 2–5)
CO2 SERPL-SCNC: 26 MMOL/L (ref 23–29)
CREAT SERPL-MCNC: 0.8 MG/DL (ref 0.5–1.4)
DIFFERENTIAL METHOD BLD: ABNORMAL
EOSINOPHIL # BLD AUTO: 0 K/UL (ref 0–0.5)
EOSINOPHIL NFR BLD: 0.7 % (ref 0–8)
ERYTHROCYTE [DISTWIDTH] IN BLOOD BY AUTOMATED COUNT: 20.2 % (ref 11.5–14.5)
EST. GFR  (NO RACE VARIABLE): >60 ML/MIN/1.73 M^2
GLUCOSE SERPL-MCNC: 84 MG/DL (ref 70–110)
HCT VFR BLD AUTO: 28.2 % (ref 37–48.5)
HDLC SERPL-MCNC: 78 MG/DL (ref 40–75)
HDLC SERPL: 47.6 % (ref 20–50)
HGB BLD-MCNC: 8.8 G/DL (ref 12–16)
IMM GRANULOCYTES # BLD AUTO: 0.01 K/UL (ref 0–0.04)
IMM GRANULOCYTES NFR BLD AUTO: 0.2 % (ref 0–0.5)
LDLC SERPL CALC-MCNC: 71.4 MG/DL (ref 63–159)
LYMPHOCYTES # BLD AUTO: 1.8 K/UL (ref 1–4.8)
LYMPHOCYTES NFR BLD: 32.3 % (ref 18–48)
MCH RBC QN AUTO: 20.4 PG (ref 27–31)
MCHC RBC AUTO-ENTMCNC: 31.2 G/DL (ref 32–36)
MCV RBC AUTO: 65 FL (ref 82–98)
MONOCYTES # BLD AUTO: 0.4 K/UL (ref 0.3–1)
MONOCYTES NFR BLD: 7.4 % (ref 4–15)
NEUTROPHILS # BLD AUTO: 3.3 K/UL (ref 1.8–7.7)
NEUTROPHILS NFR BLD: 58.9 % (ref 38–73)
NONHDLC SERPL-MCNC: 86 MG/DL
NRBC BLD-RTO: 0 /100 WBC
PLATELET # BLD AUTO: 337 K/UL (ref 150–450)
PMV BLD AUTO: ABNORMAL FL (ref 9.2–12.9)
POTASSIUM SERPL-SCNC: 3.8 MMOL/L (ref 3.5–5.1)
PROT SERPL-MCNC: 7.5 G/DL (ref 6–8.4)
RBC # BLD AUTO: 4.32 M/UL (ref 4–5.4)
SODIUM SERPL-SCNC: 143 MMOL/L (ref 136–145)
TRIGL SERPL-MCNC: 73 MG/DL (ref 30–150)
TSH SERPL DL<=0.005 MIU/L-ACNC: 0.9 UIU/ML (ref 0.4–4)
WBC # BLD AUTO: 5.64 K/UL (ref 3.9–12.7)

## 2024-08-22 PROCEDURE — 3078F DIAST BP <80 MM HG: CPT | Mod: CPTII,,, | Performed by: STUDENT IN AN ORGANIZED HEALTH CARE EDUCATION/TRAINING PROGRAM

## 2024-08-22 PROCEDURE — 84443 ASSAY THYROID STIM HORMONE: CPT | Performed by: STUDENT IN AN ORGANIZED HEALTH CARE EDUCATION/TRAINING PROGRAM

## 2024-08-22 PROCEDURE — 83036 HEMOGLOBIN GLYCOSYLATED A1C: CPT | Performed by: STUDENT IN AN ORGANIZED HEALTH CARE EDUCATION/TRAINING PROGRAM

## 2024-08-22 PROCEDURE — 3074F SYST BP LT 130 MM HG: CPT | Mod: CPTII,,, | Performed by: STUDENT IN AN ORGANIZED HEALTH CARE EDUCATION/TRAINING PROGRAM

## 2024-08-22 PROCEDURE — 80061 LIPID PANEL: CPT | Performed by: STUDENT IN AN ORGANIZED HEALTH CARE EDUCATION/TRAINING PROGRAM

## 2024-08-22 PROCEDURE — 99214 OFFICE O/P EST MOD 30 MIN: CPT | Mod: PBBFAC,PN | Performed by: STUDENT IN AN ORGANIZED HEALTH CARE EDUCATION/TRAINING PROGRAM

## 2024-08-22 PROCEDURE — 3008F BODY MASS INDEX DOCD: CPT | Mod: CPTII,,, | Performed by: STUDENT IN AN ORGANIZED HEALTH CARE EDUCATION/TRAINING PROGRAM

## 2024-08-22 PROCEDURE — 99214 OFFICE O/P EST MOD 30 MIN: CPT | Mod: S$PBB,,, | Performed by: STUDENT IN AN ORGANIZED HEALTH CARE EDUCATION/TRAINING PROGRAM

## 2024-08-22 PROCEDURE — 85025 COMPLETE CBC W/AUTO DIFF WBC: CPT | Performed by: STUDENT IN AN ORGANIZED HEALTH CARE EDUCATION/TRAINING PROGRAM

## 2024-08-22 PROCEDURE — 99999 PR PBB SHADOW E&M-EST. PATIENT-LVL IV: CPT | Mod: PBBFAC,,, | Performed by: STUDENT IN AN ORGANIZED HEALTH CARE EDUCATION/TRAINING PROGRAM

## 2024-08-22 PROCEDURE — 80053 COMPREHEN METABOLIC PANEL: CPT | Performed by: STUDENT IN AN ORGANIZED HEALTH CARE EDUCATION/TRAINING PROGRAM

## 2024-08-22 PROCEDURE — 4010F ACE/ARB THERAPY RXD/TAKEN: CPT | Mod: CPTII,,, | Performed by: STUDENT IN AN ORGANIZED HEALTH CARE EDUCATION/TRAINING PROGRAM

## 2024-08-22 PROCEDURE — 36415 COLL VENOUS BLD VENIPUNCTURE: CPT | Performed by: STUDENT IN AN ORGANIZED HEALTH CARE EDUCATION/TRAINING PROGRAM

## 2024-08-22 PROCEDURE — 3044F HG A1C LEVEL LT 7.0%: CPT | Mod: CPTII,,, | Performed by: STUDENT IN AN ORGANIZED HEALTH CARE EDUCATION/TRAINING PROGRAM

## 2024-08-22 RX ORDER — LISINOPRIL 5 MG/1
5 TABLET ORAL DAILY
Qty: 90 TABLET | Refills: 3 | Status: SHIPPED | OUTPATIENT
Start: 2024-08-22 | End: 2025-08-22

## 2024-08-22 NOTE — TELEPHONE ENCOUNTER
Called and advised patient's mother to contact neurology scheduling to establish care with another neurologist. Clarified to her that Flores Chang PA-C was following patient for neurosurgery, not neurology.     ----- Message from Tiki Kendall sent at 8/22/2024 12:53 PM CDT -----  Regarding: pt advice  Contact: [phone]  Deemartín/mother calling to inquire if she has to establish care with a new MD since Dr. Berger is gone. Pls call

## 2024-08-22 NOTE — PROGRESS NOTES
SUBJECTIVE     Chief Complaint   Patient presents with    Hypertension     Follow up       HPI  Stephany Cohn is a 24 y.o. female with multiple medical diagnoses as listed in the medical history and problem list that presents for follow-up of chronic medical problems.    HTN -   Currently prescribed lisinopril.  Patient endorses taking medication as directed.  Denies side effects or concerns while taking medication.  Patient not currently checking BP at home.  Denies headaches, vision changes, CP, palpitations, or other concerning symptoms.    Asthma stable.      PAST MEDICAL HISTORY:  Past Medical History:   Diagnosis Date    Cerebral palsy     Cognitive deficits 2/28/2014    Epilepsy     Hydrocephalus     Left spastic hemiparesis        ALLERGIES AND MEDICATIONS: updated and reviewed.  Review of patient's allergies indicates:   Allergen Reactions    Sulfur Hives    Sulfa (sulfonamide antibiotics)      Current Outpatient Medications   Medication Sig Dispense Refill    albuterol (PROVENTIL/VENTOLIN HFA) 90 mcg/actuation inhaler INHALE 2 PUFFS INTO LUNGS EVERY 6 HOURS AS NEEDED FOR WHEEZING 8.5 g 5    brivaracetam (BRIVIACT) 50 mg Tab Take 1 tablet (50 mg total) by mouth 2 (two) times daily. 60 tablet 5    diazePAM (VALTOCO) 10 mg/spray (0.1 mL) Spry 1 spray by Nasal route as needed (for a seizure lasting more than 3 minutes or more than 3 seizures in an hour). 2 each 5    diazePAM 5-7.5-10 mg (DIASTAT ACUDIAL) 5-7.5-10 mg Kit rectal kit Place 10 mg rectally as needed (for seizures). 2 kit 5    folic acid (FOLVITE) 1 MG tablet Take 1 tablet (1 mg total) by mouth once daily. 90 tablet 3    lacosamide (VIMPAT) 200 mg Tab tablet Take 1 tablet (200 mg total) by mouth every 12 (twelve) hours. 60 tablet 5    OXcarbazepine (TRILEPTAL) 300 mg/5 mL (60 mg/mL) Susp Take 10 mLs (600 mg total) by mouth 2 (two) times daily. 1800 mL 3    perampaneL (FYCOMPA) 8 mg tablet Take 1 tablet (8 mg total) by mouth every evening. 30  "tablet 5    sertraline (ZOLOFT) 100 MG tablet Take 1 tablet (100 mg total) by mouth once daily. 90 tablet 3    vitamin D (VITAMIN D3) 1000 units Tab Take 4,000 Units by mouth once daily.      lisinopriL (PRINIVIL,ZESTRIL) 5 MG tablet Take 1 tablet (5 mg total) by mouth once daily. 90 tablet 3     No current facility-administered medications for this visit.       ROS  Review of Systems   Constitutional:  Negative for chills, fatigue and fever.   HENT:  Negative for rhinorrhea and sore throat.    Respiratory:  Negative for cough and shortness of breath.    Cardiovascular:  Negative for chest pain and palpitations.   Gastrointestinal:  Negative for constipation, diarrhea, nausea and vomiting.   Genitourinary:  Negative for dysuria.   Musculoskeletal:  Negative for joint swelling.   Skin:  Negative for rash and wound.   Neurological:  Negative for light-headedness and headaches.   Psychiatric/Behavioral:  Negative for dysphoric mood and sleep disturbance. The patient is not nervous/anxious.          OBJECTIVE     Physical Exam  Vitals:    08/22/24 1311   BP: 124/66   Pulse: 97   Resp: 18   Temp: 99.1 °F (37.3 °C)    Body mass index is 20.42 kg/m².  Weight: 45.8 kg (101 lb 1.3 oz)   Height: 4' 11" (149.9 cm)     Physical Exam  Vitals reviewed.   Constitutional:       General: She is not in acute distress.  HENT:      Right Ear: External ear normal.      Left Ear: External ear normal.      Nose: Nose normal.      Mouth/Throat:      Mouth: Mucous membranes are moist.   Eyes:      Extraocular Movements: Extraocular movements intact.      Conjunctiva/sclera: Conjunctivae normal.      Pupils: Pupils are equal, round, and reactive to light.   Pulmonary:      Effort: Pulmonary effort is normal.   Abdominal:      General: There is no distension.      Palpations: Abdomen is soft.   Musculoskeletal:         General: No swelling. Normal range of motion.      Cervical back: Normal range of motion.   Skin:     General: Skin is warm " and dry.      Findings: No rash.   Neurological:      General: No focal deficit present.      Mental Status: She is alert and oriented to person, place, and time.   Psychiatric:         Mood and Affect: Mood normal.         Behavior: Behavior normal.           Health Maintenance         Date Due Completion Date    Lipid Panel Never done ---    HPV Vaccines (3 - 3-dose series) 02/17/2017 10/26/2016    Pap Smear Never done ---    COVID-19 Vaccine (3 - 2023-24 season) 09/01/2023 12/21/2021    Influenza Vaccine (1) 09/01/2024 ---    TETANUS VACCINE 08/17/2026 8/17/2016              ASSESSMENT     24 y.o. female with     1. Hypertension, essential, benign    2. Exercise-induced asthma    3. Spastic hemiplegic cerebral palsy        PLAN:     1. Hypertension, essential, benign  -Stable. Patient was counseled and encouraged to maintain a low sodium diet, as well as increasing physical activity.  Recommend random BP checks at home on a regular basis. Will continue medication at this time, and follow up in 3-6 months, or sooner if blood pressure begins to increase.     - Hemoglobin A1C; Future  - Lipid Panel; Future  - TSH; Future  - Comprehensive Metabolic Panel; Future  - CBC Auto Differential; Future  - lisinopriL (PRINIVIL,ZESTRIL) 5 MG tablet; Take 1 tablet (5 mg total) by mouth once daily.  Dispense: 90 tablet; Refill: 3    2. Exercise-induced asthma  - Stable, continue current regimen. Due for laboratory monitoring, ordered. Follow up 6 months.  - Hemoglobin A1C; Future  - Lipid Panel; Future  - TSH; Future  - Comprehensive Metabolic Panel; Future  - CBC Auto Differential; Future    3. Spastic hemiplegic cerebral palsy  - Stable, continue current regimen. Due for laboratory monitoring, ordered. Follow up 6 months.  - Hemoglobin A1C; Future  - Lipid Panel; Future  - TSH; Future  - Comprehensive Metabolic Panel; Future  - CBC Auto Differential; Future  - Ambulatory referral/consult to Physical/Occupational Therapy;  Russ Graham MD  08/22/2024 1:21 PM        Follow up in about 6 months (around 2/22/2025).

## 2024-08-23 ENCOUNTER — PATIENT MESSAGE (OUTPATIENT)
Dept: NEUROLOGY | Facility: CLINIC | Age: 24
End: 2024-08-23
Payer: MEDICAID

## 2024-08-23 DIAGNOSIS — G40.211 LOCALIZATION-RELATED (FOCAL) (PARTIAL) SYMPTOMATIC EPILEPSY AND EPILEPTIC SYNDROMES WITH COMPLEX PARTIAL SEIZURES, INTRACTABLE, WITH STATUS EPILEPTICUS: ICD-10-CM

## 2024-08-23 DIAGNOSIS — G40.219 LOCALIZATION-RELATED (FOCAL) (PARTIAL) SYMPTOMATIC EPILEPSY AND EPILEPTIC SYNDROMES WITH COMPLEX PARTIAL SEIZURES, INTRACTABLE, WITHOUT STATUS EPILEPTICUS: ICD-10-CM

## 2024-08-23 LAB
ESTIMATED AVG GLUCOSE: 94 MG/DL (ref 68–131)
HBA1C MFR BLD: 4.9 % (ref 4–5.6)

## 2024-08-26 RX ORDER — FOLIC ACID 1 MG/1
1 TABLET ORAL DAILY
Qty: 90 TABLET | Refills: 3 | Status: SHIPPED | OUTPATIENT
Start: 2024-08-26 | End: 2025-08-26

## 2024-08-26 RX ORDER — LACOSAMIDE 200 MG/1
200 TABLET ORAL EVERY 12 HOURS
Qty: 60 TABLET | Refills: 5 | Status: SHIPPED | OUTPATIENT
Start: 2024-08-26 | End: 2025-02-22

## 2024-08-26 RX ORDER — OXCARBAZEPINE 60 MG/ML
600 SUSPENSION ORAL 2 TIMES DAILY
Qty: 1800 ML | Refills: 3 | Status: SHIPPED | OUTPATIENT
Start: 2024-08-26 | End: 2025-08-27

## 2024-08-26 RX ORDER — DIAZEPAM 10 MG/2G
10 GEL RECTAL
Qty: 2 KIT | Refills: 5 | Status: SHIPPED | OUTPATIENT
Start: 2024-08-26

## 2024-08-27 ENCOUNTER — HOSPITAL ENCOUNTER (EMERGENCY)
Facility: HOSPITAL | Age: 24
Discharge: HOME | End: 2024-08-27
Attending: EMERGENCY MEDICINE
Payer: MEDICAID

## 2024-08-27 VITALS
SYSTOLIC BLOOD PRESSURE: 133 MMHG | OXYGEN SATURATION: 100 % | BODY MASS INDEX: 20.36 KG/M2 | HEIGHT: 59 IN | HEART RATE: 108 BPM | WEIGHT: 101 LBS | RESPIRATION RATE: 19 BRPM | TEMPERATURE: 99 F | DIASTOLIC BLOOD PRESSURE: 83 MMHG

## 2024-08-27 DIAGNOSIS — Z91.199 PERSONAL HISTORY OF NONCOMPLIANCE WITH MEDICAL TREATMENT, PRESENTING HAZARDS TO HEALTH: ICD-10-CM

## 2024-08-27 DIAGNOSIS — R56.9 SEIZURE: Primary | ICD-10-CM

## 2024-08-27 DIAGNOSIS — G40.909 SEIZURE DISORDER: ICD-10-CM

## 2024-08-27 LAB — CARBAMAZEPINE SERPL-MCNC: 2 UG/ML (ref 4–12)

## 2024-08-27 PROCEDURE — 96365 THER/PROPH/DIAG IV INF INIT: CPT

## 2024-08-27 PROCEDURE — 96375 TX/PRO/DX INJ NEW DRUG ADDON: CPT

## 2024-08-27 PROCEDURE — 25000003 PHARM REV CODE 250: Performed by: EMERGENCY MEDICINE

## 2024-08-27 PROCEDURE — 99284 EMERGENCY DEPT VISIT MOD MDM: CPT | Mod: 25

## 2024-08-27 PROCEDURE — C9399 UNCLASSIFIED DRUGS OR BIOLOG: HCPCS | Performed by: EMERGENCY MEDICINE

## 2024-08-27 PROCEDURE — 80156 ASSAY CARBAMAZEPINE TOTAL: CPT | Performed by: EMERGENCY MEDICINE

## 2024-08-27 PROCEDURE — C9254 INJECTION, LACOSAMIDE: HCPCS | Performed by: EMERGENCY MEDICINE

## 2024-08-27 PROCEDURE — 63600175 PHARM REV CODE 636 W HCPCS: Performed by: EMERGENCY MEDICINE

## 2024-08-27 RX ORDER — LACOSAMIDE 10 MG/ML
200 INJECTION, SOLUTION INTRAVENOUS ONCE
Status: COMPLETED | OUTPATIENT
Start: 2024-08-27 | End: 2024-08-27

## 2024-08-27 RX ORDER — LACOSAMIDE 200 MG/1
200 TABLET ORAL EVERY 12 HOURS
Qty: 6 TABLET | Refills: 0 | Status: SHIPPED | OUTPATIENT
Start: 2024-08-27 | End: 2025-08-27

## 2024-08-27 RX ORDER — LACOSAMIDE 200 MG/1
200 TABLET ORAL EVERY 12 HOURS
Qty: 6 TABLET | Refills: 0 | Status: SHIPPED | OUTPATIENT
Start: 2024-08-27 | End: 2024-08-27

## 2024-08-27 RX ADMIN — LACOSAMIDE 200 MG: 10 INJECTION INTRAVENOUS at 04:08

## 2024-08-27 RX ADMIN — BRIVARACETAM 100 MG: 50 INJECTION, SUSPENSION INTRAVENOUS at 04:08

## 2024-08-27 NOTE — ED PROVIDER NOTES
Encounter Date: 8/27/2024       History     Chief Complaint   Patient presents with    Seizures     Pt reports to the ED BIB EMS with C/O seizures that started this AM. Per EMS pt was having seizure like activity that last about 70 min. Pt has is compliant with meds and took her VIMPAT today. No new meds or dosages. Pt was given 20 mg rectal ativan at home with no relief and 10 mg Versed in route that appeared to break her seizure. Pt is calm and cooperative. Pt placed in [facility] bed for eval.      HPI  This 24-year-old black female presents emergency with a history of seizures.  She arrives after having a seizure in bed today.  The patient reports that she has been compliant with her oxcarbazepine.  She reports taking a dose of Briviact 50 mg this morning as well.  She reports that she is out of her Fycompa and her Vimpat.  There is no history of trauma the patient is otherwise well she has a mild generalized headache.  She was treated with 20 mg of rectal Valium as well as Versed in the ambulance.  The patient has a history of cerebral palsy cognitive deficits epilepsy hydrocephalus and left spastic hemiparesis.  Review of patient's allergies indicates:   Allergen Reactions    Sulfur Hives    Sulfa (sulfonamide antibiotics)      Past Medical History:   Diagnosis Date    Cerebral palsy     Cognitive deficits 2/28/2014    Epilepsy     Hydrocephalus     Left spastic hemiparesis      Past Surgical History:   Procedure Laterality Date    REVISION OF VENTRICULOPERITONEAL SHUNT  2005    REVISION OF VENTRICULOPERITONEAL SHUNT Right 11/19/2021    Procedure: REVISION, SHUNT, VENTRICULOPERITONEAL;  Surgeon: Lien Arora MD;  Location: [facility] OR [address] [place] [address];  Service: Neurosurgery;  Laterality: Right;  proximal    REVISION OF VENTRICULOPERITONEAL SHUNT Right 11/23/2021    Procedure: REVISION, SHUNT, VENTRICULOPERITONEAL;  Surgeon: Lien Arora MD;  Location: [facility] OR [address] [place] [address];  Service: Neurosurgery;  Laterality: Right;     VENTRICULOPERITONEAL SHUNT Right     approx when pt. was 2 y/o then revised in jan. 2005     Family History   Problem Relation Name Age of Onset    Hypertension Mother      Other Mother          HPV    Arthritis Mother      Hypertension Father      Hypertension Brother      Cancer Maternal Aunt       Social History     Tobacco Use    Smoking status: Never    Smokeless tobacco: Never   Substance Use Topics    Alcohol use: Never    Drug use: Never     Review of Systems  The patient was questioned specifically with regard to the following.  General: Fever, chills, sweats. Neuro: Headache. Eyes: eye problems. ENT: Ear pain, sore throat. Cardiovascular: Chest pain. Respiratory: Cough, shortness of breath. Gastrointestinal: Abdominal pain, vomiting, diarrhea. Genitourinary: Painful urination.  Musculoskeletal: Arm and leg problems. Skin: Rash.  The review of systems was negative except for the following:  Seizure activity, medical noncompliance, generalized headache  Physical Exam     Initial Vitals [08/27/24 1439]   BP Pulse Resp Temp SpO2   112/60 106 18 98.5 °F (36.9 °C) 99 %      MAP       --         Physical Exam  The patient was examined specifically for the following:   General:No significant distress, Good color, Warm and dry. Head and neck:Scalp atraumatic, Neck supple. Neurological:Appropriate conversation, Gross motor deficits. Eyes:Conjugate gaze, Clear corneas. ENT: No epistaxis. Cardiac: Regular rate and rhythm, Grossly normal heart tones. Pulmonary: Wheezing, Rales. Gastrointestinal: Abdominal tenderness, Abdominal distention. Musculoskeletal: Extremity deformity, Apparent pain with range of motion of the joints. Skin: Rash.   The findings on examination were normal except for the following:  The patient's heart rate is 106.  The lungs are clear.  The heart tones are normal.  The abdomen is soft.  The neck is supple.  The scalp is atraumatic.  The mother showed me a video of the patient having a left-sided  focal motor seizure.  Her eyes remained open.  The extremities are nontender the spine is nontender along its entire course chest abdomen and pelvis are nontender the lungs are clear the heart tones are normal in the abdomen is soft.  The patient does look sleepy.  ED Course   Procedures  Labs Reviewed   CARBAMAZEPINE LEVEL, TOTAL - Abnormal       Result Value    Carbamazepine Lvl 2.0 (*)      EKG Readings: (Independently Interpreted)   This patient is in a sinus tachycardia with a heart rate of 109.  There are no significant ST segment or T-wave changes.  There is no definite evidence of acute myocardial infarction or malignant arrhythmia.       Imaging Results    None          Medications   lacosamide injection 200 mg (200 mg Intravenous Given 8/27/24 1627)   brivaracetam (Briviact) 100 mg in D5W 50 mL IVPB (0 mg Intravenous Stopped 8/27/24 1658)     Medical Decision Making  Risk  Prescription drug management.    Given the above, this patient has been noncompliant with her antiepileptic medications.  She is out of her Vimpat.  She is out of her Fycompa.  Her carbamazepine level seems low.  I will leave her on her current dosing.  Consultation was obtained with , and Stefania Franz, who agree with an recommended 100 mg of Brevital react, and 200 mg of Vimpat IV in the emergency room.  I was also able to obtain a 6 mg dose of Fycompa.  Which was given to the patient in the emergency room. DESTINEE Franz has written for the patient's 5, and I agreed to write for the patient has Vimpat.  The patient took a 6 mg dose of Fycompa delivered to her bedside from the retail pharmacy.  The patient remained drowsy in her stay in the emergency room from treatment with Versed and Valium.  I could find no significant injuries related to the seizure the seizure occurred in bed.  The patient is otherwise well.  I will discharge to outpatient evaluation and treatment in neurology clinic.                                    Clinical Impression:  Final diagnoses:  [R56.9] Seizure (Primary)  [G40.909] Seizure disorder  [Z91.199] Personal history of noncompliance with medical treatment, presenting hazards to health          ED Disposition Condition    Discharge Stable          ED Prescriptions       Medication Sig Dispense Start Date End Date Auth. Provider    perampaneL (FYCOMPA) 6 mg tablet Take 1 tablet (6 mg total) by mouth every evening. 3 tablet 8/27/2024 -- Rigoberto Omer MD    lacosamide (VIMPAT) 200 mg Tab tablet  (Status: Discontinued) Take 1 tablet (200 mg total) by mouth every 12 (twelve) hours. 6 tablet 8/27/2024 8/27/2024 Rigoberto Omer MD    lacosamide (VIMPAT) 200 mg Tab tablet Take 1 tablet (200 mg total) by mouth every 12 (twelve) hours. 6 tablet 8/27/2024 8/27/2025 Rigoberto Omer MD          Follow-up Information       Follow up With Specialties Details Why Contact Info    Stefania Franz PA-C Neurology In 1 day Please call tomorrow and discuss your supply of the medicines for your epilepsy. [address] [facility] [address]  [phone]               Rigoberto Omer MD  08/27/24 1043       Rigoberto Omer MD  08/27/24 2500

## 2024-08-27 NOTE — DISCHARGE INSTRUCTIONS
Please continue your usual medicines.  These are Fycompa 8 mg at night before bed, Vimpat, 200 mg twice a day, oxcarbazepine 10 mL twice a day, and Briviact 50 mg twice a day.  Please call your neurology clinic and speak to Ms. Osei tomorrow morning and review your medication list to be sure that you have all of the medicines.  Return to the emergency room if you get worse or if new problems develop.  Rest.  I wrote a 3 day supply of the Vimpat.  I believe it is ordered and should be delivered to your house.  You also have 3 Fycompa tablets to get you through for the next 3 days until your prescription arrives at the house.  Rest.  Seizure precautions.  No bathing alone, no swimming, no high places dangerous environments driving.

## 2024-08-28 LAB
OHS QRS DURATION: 76 MS
OHS QTC CALCULATION: 430 MS

## 2024-09-16 DIAGNOSIS — D50.9 IRON DEFICIENCY ANEMIA, UNSPECIFIED IRON DEFICIENCY ANEMIA TYPE: Primary | ICD-10-CM

## 2024-09-16 RX ORDER — FERROUS SULFATE 325(65) MG
325 TABLET, DELAYED RELEASE (ENTERIC COATED) ORAL DAILY
Qty: 90 TABLET | Refills: 3 | Status: SHIPPED | OUTPATIENT
Start: 2024-09-16

## 2024-10-23 ENCOUNTER — PATIENT MESSAGE (OUTPATIENT)
Dept: NEUROLOGY | Facility: CLINIC | Age: 24
End: 2024-10-23
Payer: MEDICAID

## 2024-10-23 DIAGNOSIS — G40.219 LOCALIZATION-RELATED (FOCAL) (PARTIAL) SYMPTOMATIC EPILEPSY AND EPILEPTIC SYNDROMES WITH COMPLEX PARTIAL SEIZURES, INTRACTABLE, WITHOUT STATUS EPILEPTICUS: ICD-10-CM

## 2024-10-29 ENCOUNTER — PATIENT MESSAGE (OUTPATIENT)
Facility: CLINIC | Age: 24
End: 2024-10-29
Payer: MEDICAID

## 2024-10-29 DIAGNOSIS — M94.0 COSTOCHONDRITIS: ICD-10-CM

## 2024-10-29 DIAGNOSIS — G80.2 SPASTIC HEMIPLEGIC CEREBRAL PALSY: Primary | ICD-10-CM

## 2024-12-10 ENCOUNTER — PATIENT MESSAGE (OUTPATIENT)
Facility: CLINIC | Age: 24
End: 2024-12-10
Payer: MEDICAID

## 2024-12-11 ENCOUNTER — PATIENT MESSAGE (OUTPATIENT)
Facility: CLINIC | Age: 24
End: 2024-12-11
Payer: MEDICAID

## 2025-01-01 ENCOUNTER — HOSPITAL ENCOUNTER (EMERGENCY)
Facility: HOSPITAL | Age: 25
Discharge: HOME | End: 2025-01-02
Attending: STUDENT IN AN ORGANIZED HEALTH CARE EDUCATION/TRAINING PROGRAM
Payer: MEDICAID

## 2025-01-01 DIAGNOSIS — G40.919 BREAKTHROUGH SEIZURE: Primary | ICD-10-CM

## 2025-01-01 DIAGNOSIS — R56.9 SEIZURE: ICD-10-CM

## 2025-01-01 LAB
ALBUMIN SERPL BCP-MCNC: 4.5 G/DL (ref 3.5–5.2)
ALP SERPL-CCNC: 55 U/L (ref 40–150)
ALT SERPL W/O P-5'-P-CCNC: 13 U/L (ref 10–44)
ANION GAP SERPL CALC-SCNC: 14 MMOL/L (ref 8–16)
AST SERPL-CCNC: 13 U/L (ref 10–40)
BASOPHILS # BLD AUTO: 0.03 K/UL (ref 0–0.2)
BASOPHILS NFR BLD: 0.3 % (ref 0–1.9)
BILIRUB SERPL-MCNC: 0.2 MG/DL (ref 0.1–1)
BUN SERPL-MCNC: 12 MG/DL (ref 6–20)
CALCIUM SERPL-MCNC: 9.5 MG/DL (ref 8.7–10.5)
CHLORIDE SERPL-SCNC: 109 MMOL/L (ref 95–110)
CO2 SERPL-SCNC: 18 MMOL/L (ref 23–29)
CREAT SERPL-MCNC: 0.8 MG/DL (ref 0.5–1.4)
DIFFERENTIAL METHOD BLD: ABNORMAL
EOSINOPHIL # BLD AUTO: 0 K/UL (ref 0–0.5)
EOSINOPHIL NFR BLD: 0.2 % (ref 0–8)
ERYTHROCYTE [DISTWIDTH] IN BLOOD BY AUTOMATED COUNT: 19.6 % (ref 11.5–14.5)
EST. GFR  (NO RACE VARIABLE): >60 ML/MIN/1.73 M^2
GLUCOSE SERPL-MCNC: 85 MG/DL (ref 70–110)
HCT VFR BLD AUTO: 35.4 % (ref 37–48.5)
HGB BLD-MCNC: 11 G/DL (ref 12–16)
IMM GRANULOCYTES # BLD AUTO: 0.04 K/UL (ref 0–0.04)
IMM GRANULOCYTES NFR BLD AUTO: 0.3 % (ref 0–0.5)
LYMPHOCYTES # BLD AUTO: 1.3 K/UL (ref 1–4.8)
LYMPHOCYTES NFR BLD: 10.8 % (ref 18–48)
MAGNESIUM SERPL-MCNC: 1.8 MG/DL (ref 1.6–2.6)
MCH RBC QN AUTO: 21.4 PG (ref 27–31)
MCHC RBC AUTO-ENTMCNC: 31.1 G/DL (ref 32–36)
MCV RBC AUTO: 69 FL (ref 82–98)
MONOCYTES # BLD AUTO: 0.7 K/UL (ref 0.3–1)
MONOCYTES NFR BLD: 5.6 % (ref 4–15)
NEUTROPHILS # BLD AUTO: 9.6 K/UL (ref 1.8–7.7)
NEUTROPHILS NFR BLD: 82.8 % (ref 38–73)
NRBC BLD-RTO: 0 /100 WBC
PLATELET # BLD AUTO: 274 K/UL (ref 150–450)
PMV BLD AUTO: 10.4 FL (ref 9.2–12.9)
POTASSIUM SERPL-SCNC: 3.5 MMOL/L (ref 3.5–5.1)
PROT SERPL-MCNC: 8.2 G/DL (ref 6–8.4)
RBC # BLD AUTO: 5.13 M/UL (ref 4–5.4)
SODIUM SERPL-SCNC: 141 MMOL/L (ref 136–145)
WBC # BLD AUTO: 11.56 K/UL (ref 3.9–12.7)

## 2025-01-01 PROCEDURE — 96375 TX/PRO/DX INJ NEW DRUG ADDON: CPT

## 2025-01-01 PROCEDURE — 63600175 PHARM REV CODE 636 W HCPCS: Performed by: STUDENT IN AN ORGANIZED HEALTH CARE EDUCATION/TRAINING PROGRAM

## 2025-01-01 PROCEDURE — 80299 QUANTITATIVE ASSAY DRUG: CPT | Mod: 91 | Performed by: STUDENT IN AN ORGANIZED HEALTH CARE EDUCATION/TRAINING PROGRAM

## 2025-01-01 PROCEDURE — 80183 DRUG SCRN QUANT OXCARBAZEPIN: CPT | Performed by: STUDENT IN AN ORGANIZED HEALTH CARE EDUCATION/TRAINING PROGRAM

## 2025-01-01 PROCEDURE — 96374 THER/PROPH/DIAG INJ IV PUSH: CPT

## 2025-01-01 PROCEDURE — 80299 QUANTITATIVE ASSAY DRUG: CPT | Performed by: STUDENT IN AN ORGANIZED HEALTH CARE EDUCATION/TRAINING PROGRAM

## 2025-01-01 PROCEDURE — 85025 COMPLETE CBC W/AUTO DIFF WBC: CPT | Performed by: STUDENT IN AN ORGANIZED HEALTH CARE EDUCATION/TRAINING PROGRAM

## 2025-01-01 PROCEDURE — C9254 INJECTION, LACOSAMIDE: HCPCS | Performed by: STUDENT IN AN ORGANIZED HEALTH CARE EDUCATION/TRAINING PROGRAM

## 2025-01-01 PROCEDURE — 99285 EMERGENCY DEPT VISIT HI MDM: CPT | Mod: 25

## 2025-01-01 PROCEDURE — 25000003 PHARM REV CODE 250: Performed by: STUDENT IN AN ORGANIZED HEALTH CARE EDUCATION/TRAINING PROGRAM

## 2025-01-01 PROCEDURE — 81025 URINE PREGNANCY TEST: CPT | Performed by: STUDENT IN AN ORGANIZED HEALTH CARE EDUCATION/TRAINING PROGRAM

## 2025-01-01 PROCEDURE — 93010 ELECTROCARDIOGRAM REPORT: CPT | Mod: ,,, | Performed by: INTERNAL MEDICINE

## 2025-01-01 PROCEDURE — 80053 COMPREHEN METABOLIC PANEL: CPT | Performed by: STUDENT IN AN ORGANIZED HEALTH CARE EDUCATION/TRAINING PROGRAM

## 2025-01-01 PROCEDURE — 93005 ELECTROCARDIOGRAM TRACING: CPT

## 2025-01-01 PROCEDURE — 80235 DRUG ASSAY LACOSAMIDE: CPT | Performed by: STUDENT IN AN ORGANIZED HEALTH CARE EDUCATION/TRAINING PROGRAM

## 2025-01-01 PROCEDURE — C9399 UNCLASSIFIED DRUGS OR BIOLOG: HCPCS | Performed by: STUDENT IN AN ORGANIZED HEALTH CARE EDUCATION/TRAINING PROGRAM

## 2025-01-01 PROCEDURE — 83735 ASSAY OF MAGNESIUM: CPT | Performed by: STUDENT IN AN ORGANIZED HEALTH CARE EDUCATION/TRAINING PROGRAM

## 2025-01-01 RX ORDER — LACOSAMIDE 10 MG/ML
400 INJECTION, SOLUTION INTRAVENOUS ONCE
Status: COMPLETED | OUTPATIENT
Start: 2025-01-01 | End: 2025-01-01

## 2025-01-01 RX ADMIN — LACOSAMIDE 400 MG: 10 INJECTION INTRAVENOUS at 08:01

## 2025-01-01 RX ADMIN — BRIVARACETAM 100 MG: 50 INJECTION, SUSPENSION INTRAVENOUS at 09:01

## 2025-01-02 VITALS
OXYGEN SATURATION: 100 % | RESPIRATION RATE: 20 BRPM | WEIGHT: 101 LBS | BODY MASS INDEX: 20.36 KG/M2 | TEMPERATURE: 99 F | DIASTOLIC BLOOD PRESSURE: 79 MMHG | HEIGHT: 59 IN | HEART RATE: 84 BPM | SYSTOLIC BLOOD PRESSURE: 106 MMHG

## 2025-01-02 LAB
B-HCG UR QL: NEGATIVE
BACTERIA #/AREA URNS HPF: ABNORMAL /HPF
BILIRUB UR QL STRIP: NEGATIVE
CLARITY UR: CLEAR
COLOR UR: YELLOW
CTP QC/QA: YES
GLUCOSE UR QL STRIP: NEGATIVE
HGB UR QL STRIP: NEGATIVE
HYALINE CASTS #/AREA URNS LPF: 3 /LPF
KETONES UR QL STRIP: ABNORMAL
LEUKOCYTE ESTERASE UR QL STRIP: NEGATIVE
MICROSCOPIC COMMENT: ABNORMAL
NITRITE UR QL STRIP: NEGATIVE
OHS QRS DURATION: 84 MS
OHS QTC CALCULATION: 430 MS
PH UR STRIP: 6 [PH] (ref 5–8)
PROT UR QL STRIP: ABNORMAL
RBC #/AREA URNS HPF: 0 /HPF (ref 0–4)
SP GR UR STRIP: >1.03 (ref 1–1.03)
SQUAMOUS #/AREA URNS HPF: 3 /HPF
URN SPEC COLLECT METH UR: ABNORMAL
UROBILINOGEN UR STRIP-ACNC: NEGATIVE EU/DL
WBC #/AREA URNS HPF: 2 /HPF (ref 0–5)

## 2025-01-02 PROCEDURE — 81000 URINALYSIS NONAUTO W/SCOPE: CPT | Performed by: STUDENT IN AN ORGANIZED HEALTH CARE EDUCATION/TRAINING PROGRAM

## 2025-01-02 NOTE — ED PROVIDER NOTES
Encounter Date: 2025    SCRIBE #1 NOTE: I, Fe Adams, am scribing for, and in the presence of,  Boston Omer MD. I have scribed the following portions of the note - Other sections scribed: HPI,ROS,PE.       History     Chief Complaint   Patient presents with    Seizures     Pt arrived via ems, pt chief complaint is Seizures. Pt family called 911 due to seizures on going for about an hour. Pt was given 10mg versed IM activity has stopped at this time.      Stephany Cohn is a 24 y.o. female, with a PMHx of  IPH s/p  shunt, cerebral palsy, epilepsy, left spastic hemiparesis, who presents to the ED via EMS with seizure onset PTA. Independent Historian: EMS reports an associated symptom of tremors to left extremities/face and patient reports an associated symptom of headache. EMS reports being called to the scene because the patient had a seizure that lasted about 1 hour. En route patient was given 10 mg of versed IM which alleviated the seizure. Independent Historian: Patient's mother reports that they were about to eat dinner when the patient told her mother to get her medications because she felt that she was about to have a seizure. Patient's mother reports that the patient began to have tremors to her left arm and left lower extremities before she began to seize. Patient's mother reports that the patient was seizing for about 1 hour with no alleviation after taking her medication before she called EMS. Patient's mother reports that the patient's last seizure was 24.  Patient reports that she is adherent to her Vimpat, oxcarbazepine, Briviact but not her Fycompa; she notes she ran out of her Fycompa and has not refilled yet. Patient's mother notes that the patient sees neurology at [facility]. No other exacerbating or alleviating factors. Mom denies any recent illnesses, fevers, chills, dysuria, urinary frequency or other concerning symptoms    The history is provided by the  patient, the EMS personnel and a parent. No  was used.     Review of patient's allergies indicates:   Allergen Reactions    Sulfur Hives    Sulfa (sulfonamide antibiotics)      Past Medical History:   Diagnosis Date    Cerebral palsy     Cognitive deficits 2/28/2014    Epilepsy     Hydrocephalus     Left spastic hemiparesis      Past Surgical History:   Procedure Laterality Date    REVISION OF VENTRICULOPERITONEAL SHUNT  2005    REVISION OF VENTRICULOPERITONEAL SHUNT Right 11/19/2021    Procedure: REVISION, SHUNT, VENTRICULOPERITONEAL;  Surgeon: Lien Arora MD;  Location: [facility] OR [address] [place] [address];  Service: Neurosurgery;  Laterality: Right;  proximal    REVISION OF VENTRICULOPERITONEAL SHUNT Right 11/23/2021    Procedure: REVISION, SHUNT, VENTRICULOPERITONEAL;  Surgeon: Lien Arora MD;  Location: [facility] OR [address] [place] [address];  Service: Neurosurgery;  Laterality: Right;    VENTRICULOPERITONEAL SHUNT Right     approx when pt. was 2 y/o then revised in jan. 2005     Family History   Problem Relation Name Age of Onset    Hypertension Mother      Other Mother          HPV    Arthritis Mother      Hypertension Father      Hypertension Brother      Cancer Maternal Aunt       Social History     Tobacco Use    Smoking status: Never    Smokeless tobacco: Never   Substance Use Topics    Alcohol use: Never    Drug use: Never     Review of Systems   Constitutional:  Negative for fever.   HENT:  Negative for sore throat.    Eyes:  Negative for visual disturbance.   Respiratory:  Negative for shortness of breath.    Cardiovascular:  Negative for chest pain.   Gastrointestinal:  Negative for abdominal pain, diarrhea and vomiting.   Genitourinary:  Negative for dysuria.   Skin:  Negative for rash.   Neurological:  Positive for tremors (to left upper and lower extremities), seizures and headaches. Negative for syncope and weakness.       Physical Exam     Initial Vitals [01/01/25 1905]   BP Pulse Resp Temp SpO2   121/75 104  20 98.4 °F (36.9 °C) 99 %      MAP       --         Physical Exam    Nursing note and vitals reviewed.  Constitutional: She appears well-developed and well-nourished. She appears lethargic.  Non-toxic appearance. She does not appear ill.   HENT:   Head: Normocephalic and atraumatic. Mouth/Throat: Mucous membranes are normal.   Eyes: EOM are normal.   Neck: Neck supple.   Cardiovascular:  Normal rate and regular rhythm.           Pulmonary/Chest: Effort normal and breath sounds normal. No respiratory distress.   Abdominal: Abdomen is soft. Bowel sounds are normal. She exhibits no distension. There is no abdominal tenderness.   Musculoskeletal:         General: Normal range of motion.      Cervical back: Neck supple.     Neurological: She appears lethargic. She displays tremor.   Patient appears postictal. Patient is awake and able to make eye contact. Patient is able to answer simple questions. Equal strength to all extremities bilaterally. Focal left lower and left upper extremity tremors. Unable to palpate patient's  shunt.    Skin: Skin is warm and dry.   Psychiatric: She has a normal mood and affect.         ED Course   Procedures  Labs Reviewed   CBC W/ AUTO DIFFERENTIAL - Abnormal       Result Value    WBC 11.56      RBC 5.13      Hemoglobin 11.0 (*)     Hematocrit 35.4 (*)     MCV 69 (*)     MCH 21.4 (*)     MCHC 31.1 (*)     RDW 19.6 (*)     Platelets 274      MPV 10.4      Immature Granulocytes 0.3      Gran # (ANC) 9.6 (*)     Immature Grans (Abs) 0.04      Lymph # 1.3      Mono # 0.7      Eos # 0.0      Baso # 0.03      nRBC 0      Gran % 82.8 (*)     Lymph % 10.8 (*)     Mono % 5.6      Eosinophil % 0.2      Basophil % 0.3      Differential Method Automated     COMPREHENSIVE METABOLIC PANEL - Abnormal    Sodium 141      Potassium 3.5      Chloride 109      CO2 18 (*)     Glucose 85      BUN 12      Creatinine 0.8      Calcium 9.5      Total Protein 8.2      Albumin 4.5      Total Bilirubin 0.2       Alkaline Phosphatase 55      AST 13      ALT 13      eGFR >60      Anion Gap 14     MAGNESIUM    Magnesium 1.8     PERAMPANEL (FYCOMPA), QUANT   URINALYSIS, REFLEX TO URINE CULTURE   OXCARBAZEPINE METABOLITE (MHC)   LACOSAMIDE (VIMPAT)   BRIVARACETAM, PLASMA   URINALYSIS MICROSCOPIC   POCT URINE PREGNANCY    POC Preg Test, Ur Negative       Acceptable Yes            Imaging Results              CT Head Without Contrast (Final result)  Result time 01/01/25 21:34:46      Final result by Jakob Roy MD (01/01/25 21:34:46)                   Impression:      No acute intracranial abnormalities.    Right posterior parietal shunt catheter appears similar in position compared to prior.  Ventriculomegaly is again seen but overall size and appearance of ventricular system appears similar to prior.    Minimal focal dural thickening on the left side over the frontal convexity is again seen and appears unchanged from prior.  Possible sequela of prior bleed.  No acute hemorrhage identified.    There are chronic dural-based calcifications on the right side close to the vertex which appear unchanged, possibly sequela of a prior hemorrhage.      Electronically signed by: Jakob Roy MD  Date:    01/01/2025  Time:    21:34               Narrative:    EXAMINATION:  CT HEAD WITHOUT CONTRAST    CLINICAL HISTORY:   shunt;    TECHNIQUE:  Low dose axial images were obtained through the head.  Coronal and sagittal reformations were also performed. Contrast was not administered.    COMPARISON:  01/22/2024.    FINDINGS:  Right posterior parietal shunt catheter appears similar in position compared to prior.  Ventriculomegaly is again seen but overall size and appearance of ventricular system appears similar to prior.  Minimal focal dural thickening on the left side over the frontal convexity is again seen and appears unchanged from prior.  There is no evidence of acute infarct, acute hemorrhage, or mass.  The  ventricular system is stable in size.  No mass-effect or midline shift.  There are chronic dural-based calcifications on the right side close to the vertex which appear unchanged, possibly sequela of a prior hemorrhage.  The paranasal sinuses and mastoid air cells are clear.  The calvarium appears unchanged.  .                                       X-Ray Shunt Series (Final result)  Result time 01/01/25 19:56:31      Final result by Terell Moss MD (01/01/25 19:56:31)                   Impression:      See above.      Electronically signed by: Terell Moss MD  Date:    01/01/2025  Time:    19:56               Narrative:    EXAMINATION:  XR SHUNT SERIES    CLINICAL HISTORY:   shunt with seizure;    TECHNIQUE:  Shunt series 5 total views were obtained.    COMPARISON:  January 2024.    FINDINGS:  Right-sided  shunt catheter is visualized.  This is difficult to track in the lower chest and upper abdomen.  Catheter terminates in the left lower pelvic region.  No obvious abnormalities are seen along the visualized shunt tract.    Cardiac silhouette is normal in size.  Lungs are expanded with no consolidation, pneumothorax, or pleural effusion seen.  Nonspecific, nonobstructive bowel gas pattern.                                       Medications   brivaracetam (Briviact) 100 mg in D5W 50 mL IVPB (0 mg Intravenous Stopped 1/1/25 2115)   lacosamide injection 400 mg (400 mg Intravenous Given 1/1/25 2050)     Medical Decision Making  Amount and/or Complexity of Data Reviewed  External Data Reviewed: notes.     Details: See HPI  Labs: ordered. Decision-making details documented in ED Course.  Radiology: ordered and independent interpretation performed.  ECG/medicine tests: ordered and independent interpretation performed.    Risk  Prescription drug management.    Vitals unremarkable, patient is afebrile   She appears sedated from the Versed versus postictal  She will wake up and make eye contact and answers some  simple questions but she immediately falls back asleep   She was initially having intermittent tremor of the left upper and left lower extremity that lasted just several minutes before resolving  Mom showed me a video of her seizure activity in his left-sided facial twitching, left upper extremity and left lower extremity shaking; the patient is awake and alert during these events and will answer questions; mom notes they seizures do not generalize  After 2 hour observation she has had no reoccurrence of her seizure activity  She was loaded with 100 mg of Briviact and 400 mg of Vimpat  CT head shows no change from prior   Shunt series shows no abnormality  I do not suspect a shunt malfunction; she is not having any constitutional symptoms  Suspect that this is likely a breakthrough seizure due to medication non adherence  Basic labs reviewed and unremarkable  I spoke with Neurology on-call who recommends a dose of Fycompa if we can get it   We do not have Fycompa here at this time  He feels the patient would be okay with discharge after the above Briviact and Vimpat load   On reassessment patient is awake alert and has no complaint at this time  Counseled her on the importance of adherence to her for antiepileptics   Advised that she needs to get her Fycompa filled as soon as possible and take her medications as directed  Advised her to follow up with the neurologist on an outpatient basis for re-evaluation of her medication regimen  Last to return for reoccurrence of her seizure activity or other concerning symptoms  Patient verbalized understanding and agreement with the plan  She is stable for discharge    I discussed with the patient/family the diagnosis, treatment plan, indications for return to the emergency department, and for expected follow-up. The patient/family verbalized an understanding. The patient/family is asked if there are any questions or concerns. We discuss the case, until all issues are  addressed to the patient/familys satisfaction. Patient/family understands and is agreeable to the plan.   Boston Omer    DISCLAIMER: This note was prepared with [employer] voice recognition transcription software. Garbled syntax, mangled pronouns, and other bizarre constructions may be attributed to that software system.                Scribe Attestation:   Scribe #1: I performed the above scribed service and the documentation accurately describes the services I performed. I attest to the accuracy of the note.              I, Boston Omer MD, personally performed the services described in this documentation. All medical record entries made by the scribe were at my direction and in my presence. I have reviewed the chart and agree that the record reflects my personal performance and is accurate and complete.      DISCLAIMER: This note was prepared with [employer] voice recognition transcription software. Garbled syntax, mangled pronouns, and other bizarre constructions may be attributed to that software system.                  Clinical Impression:  Final diagnoses:  [R56.9] Seizure  [G40.919] Breakthrough seizure (Primary)          ED Disposition Condition    Discharge Stable          ED Prescriptions    None       Follow-up Information    None          Boston Omer MD  01/02/25 0044

## 2025-01-02 NOTE — ED NOTES
No seizure activity noted while in ED. Patient awake, alert, able to answer all questions appropriately, and denies any complaints. Mother at bedside to provide ride home at discharge. No distress noted.

## 2025-01-02 NOTE — ED NOTES
Patient very difficult stick. Even after IV obtained, still unable to get ordered blood work. Will escalate and attempt blood work collection via butterfly.

## 2025-01-02 NOTE — ED TRIAGE NOTES
Pt arrives to ED from home via EMS with reports of witnessed seizure tonight; pt's mother reports that pt felt a seizure was coming on then began having seizure activity; mother gave RX rectal Valium, which she reports usually stops seizure but did not tonight; upon EMS arrival, pt still having seizure activity; pt given 10mg Versed IM with cessation of seizure; pt arrives to ED, awake but drowsy appearing, is able to appropriately answer some questions, but slow delayed speech noted; hx of seizures, cerebral palsy, &  shunt and mother reports compliance with all medications; last seizure 8/27/24.

## 2025-01-02 NOTE — DISCHARGE INSTRUCTIONS
You need to refill your Fycompa tomorrow and continue to take all of your medications as directed.  Follow up with the neurologist on an outpatient basis for re-evaluation of your medications..  Return for reoccurrence of her seizure activities.  You will likely have additional seizure if you do not take your medications like you are supposed to.  Thank you.

## 2025-01-04 LAB
LACOSAMIDE: <0.5 MCG/ML (ref 1–10)
OXCARBAZEPINE METABOLITE: 20 MCG/ML (ref 10–35)

## 2025-01-06 LAB — BRIVARACETAM SERPL-MCNC: <0.1 MCG/ML (ref 0.2–2)

## 2025-01-07 LAB — PERAMPANEL SERPL-MCNC: 286 NG/ML (ref 180–980)

## 2025-02-20 DIAGNOSIS — F06.30 MOOD DISORDER DUE TO MEDICAL CONDITION: ICD-10-CM

## 2025-02-20 RX ORDER — SERTRALINE HYDROCHLORIDE 100 MG/1
100 TABLET, FILM COATED ORAL DAILY
Qty: 90 TABLET | Refills: 3 | Status: SHIPPED | OUTPATIENT
Start: 2025-02-20 | End: 2026-02-20

## 2025-02-27 DIAGNOSIS — G40.219 LOCALIZATION-RELATED (FOCAL) (PARTIAL) SYMPTOMATIC EPILEPSY AND EPILEPTIC SYNDROMES WITH COMPLEX PARTIAL SEIZURES, INTRACTABLE, WITHOUT STATUS EPILEPTICUS: ICD-10-CM

## 2025-02-27 DIAGNOSIS — G40.211 LOCALIZATION-RELATED (FOCAL) (PARTIAL) SYMPTOMATIC EPILEPSY AND EPILEPTIC SYNDROMES WITH COMPLEX PARTIAL SEIZURES, INTRACTABLE, WITH STATUS EPILEPTICUS: ICD-10-CM

## 2025-02-28 RX ORDER — DIAZEPAM 10 MG/2G
10 GEL RECTAL
Qty: 2 KIT | Refills: 5 | Status: SHIPPED | OUTPATIENT
Start: 2025-02-28

## 2025-03-03 DIAGNOSIS — G40.219 LOCALIZATION-RELATED (FOCAL) (PARTIAL) SYMPTOMATIC EPILEPSY AND EPILEPTIC SYNDROMES WITH COMPLEX PARTIAL SEIZURES, INTRACTABLE, WITHOUT STATUS EPILEPTICUS: ICD-10-CM

## 2025-03-05 RX ORDER — LACOSAMIDE 200 MG/1
200 TABLET ORAL EVERY 12 HOURS
Qty: 60 TABLET | Refills: 5 | Status: SHIPPED | OUTPATIENT
Start: 2025-03-05 | End: 2025-09-01

## 2025-04-10 ENCOUNTER — OFFICE VISIT (OUTPATIENT)
Facility: CLINIC | Age: 25
End: 2025-04-10
Payer: MEDICAID

## 2025-04-10 ENCOUNTER — LAB VISIT (OUTPATIENT)
Dept: LAB | Facility: HOSPITAL | Age: 25
End: 2025-04-10
Attending: STUDENT IN AN ORGANIZED HEALTH CARE EDUCATION/TRAINING PROGRAM
Payer: MEDICAID

## 2025-04-10 VITALS
SYSTOLIC BLOOD PRESSURE: 112 MMHG | OXYGEN SATURATION: 99 % | HEART RATE: 87 BPM | WEIGHT: 104.19 LBS | RESPIRATION RATE: 18 BRPM | HEIGHT: 59 IN | TEMPERATURE: 98 F | BODY MASS INDEX: 21 KG/M2 | DIASTOLIC BLOOD PRESSURE: 60 MMHG

## 2025-04-10 DIAGNOSIS — I10 HYPERTENSION, ESSENTIAL, BENIGN: ICD-10-CM

## 2025-04-10 DIAGNOSIS — D64.9 ANEMIA, UNSPECIFIED TYPE: ICD-10-CM

## 2025-04-10 DIAGNOSIS — J45.990 EXERCISE-INDUCED ASTHMA: ICD-10-CM

## 2025-04-10 DIAGNOSIS — G40.219 LOCALIZATION-RELATED (FOCAL) (PARTIAL) SYMPTOMATIC EPILEPSY AND EPILEPTIC SYNDROMES WITH COMPLEX PARTIAL SEIZURES, INTRACTABLE, WITHOUT STATUS EPILEPTICUS: ICD-10-CM

## 2025-04-10 DIAGNOSIS — G80.2 SPASTIC HEMIPLEGIC CEREBRAL PALSY: Primary | ICD-10-CM

## 2025-04-10 LAB
ALBUMIN SERPL BCP-MCNC: 4.2 G/DL (ref 3.5–5.2)
ALP SERPL-CCNC: 53 UNIT/L (ref 40–150)
ALT SERPL W/O P-5'-P-CCNC: 11 UNIT/L (ref 10–44)
ANION GAP (OHS): 10 MMOL/L (ref 8–16)
AST SERPL-CCNC: 13 UNIT/L (ref 11–45)
BILIRUB SERPL-MCNC: 0.1 MG/DL (ref 0.1–1)
BUN SERPL-MCNC: 13 MG/DL (ref 6–20)
CALCIUM SERPL-MCNC: 9 MG/DL (ref 8.7–10.5)
CHLORIDE SERPL-SCNC: 108 MMOL/L (ref 95–110)
CO2 SERPL-SCNC: 22 MMOL/L (ref 23–29)
CREAT SERPL-MCNC: 0.7 MG/DL (ref 0.5–1.4)
GFR SERPLBLD CREATININE-BSD FMLA CKD-EPI: >60 ML/MIN/1.73/M2
GLUCOSE SERPL-MCNC: 58 MG/DL (ref 70–110)
IRON SATN MFR SERPL: 5 % (ref 20–50)
IRON SERPL-MCNC: 24 UG/DL (ref 30–160)
LDH SERPL-CCNC: 154 U/L (ref 110–260)
POTASSIUM SERPL-SCNC: 3.8 MMOL/L (ref 3.5–5.1)
PROT SERPL-MCNC: 8.2 GM/DL (ref 6–8.4)
SODIUM SERPL-SCNC: 140 MMOL/L (ref 136–145)
TIBC SERPL-MCNC: 441 UG/DL (ref 250–450)
TRANSFERRIN SERPL-MCNC: 298 MG/DL (ref 200–375)

## 2025-04-10 PROCEDURE — 36415 COLL VENOUS BLD VENIPUNCTURE: CPT | Mod: PN

## 2025-04-10 PROCEDURE — 1160F RVW MEDS BY RX/DR IN RCRD: CPT | Mod: CPTII,,, | Performed by: STUDENT IN AN ORGANIZED HEALTH CARE EDUCATION/TRAINING PROGRAM

## 2025-04-10 PROCEDURE — 83615 LACTATE (LD) (LDH) ENZYME: CPT

## 2025-04-10 PROCEDURE — 82746 ASSAY OF FOLIC ACID SERUM: CPT

## 2025-04-10 PROCEDURE — 3078F DIAST BP <80 MM HG: CPT | Mod: CPTII,,, | Performed by: STUDENT IN AN ORGANIZED HEALTH CARE EDUCATION/TRAINING PROGRAM

## 2025-04-10 PROCEDURE — 82607 VITAMIN B-12: CPT

## 2025-04-10 PROCEDURE — 99214 OFFICE O/P EST MOD 30 MIN: CPT | Mod: S$PBB,,, | Performed by: STUDENT IN AN ORGANIZED HEALTH CARE EDUCATION/TRAINING PROGRAM

## 2025-04-10 PROCEDURE — 3008F BODY MASS INDEX DOCD: CPT | Mod: CPTII,,, | Performed by: STUDENT IN AN ORGANIZED HEALTH CARE EDUCATION/TRAINING PROGRAM

## 2025-04-10 PROCEDURE — 4010F ACE/ARB THERAPY RXD/TAKEN: CPT | Mod: CPTII,,, | Performed by: STUDENT IN AN ORGANIZED HEALTH CARE EDUCATION/TRAINING PROGRAM

## 2025-04-10 PROCEDURE — 3074F SYST BP LT 130 MM HG: CPT | Mod: CPTII,,, | Performed by: STUDENT IN AN ORGANIZED HEALTH CARE EDUCATION/TRAINING PROGRAM

## 2025-04-10 PROCEDURE — 1159F MED LIST DOCD IN RCRD: CPT | Mod: CPTII,,, | Performed by: STUDENT IN AN ORGANIZED HEALTH CARE EDUCATION/TRAINING PROGRAM

## 2025-04-10 PROCEDURE — 80053 COMPREHEN METABOLIC PANEL: CPT

## 2025-04-10 PROCEDURE — 99999 PR PBB SHADOW E&M-EST. PATIENT-LVL IV: CPT | Mod: PBBFAC,,, | Performed by: STUDENT IN AN ORGANIZED HEALTH CARE EDUCATION/TRAINING PROGRAM

## 2025-04-10 PROCEDURE — 99214 OFFICE O/P EST MOD 30 MIN: CPT | Mod: PBBFAC,PN | Performed by: STUDENT IN AN ORGANIZED HEALTH CARE EDUCATION/TRAINING PROGRAM

## 2025-04-10 PROCEDURE — 84466 ASSAY OF TRANSFERRIN: CPT

## 2025-04-10 RX ORDER — OXCARBAZEPINE 60 MG/ML
600 SUSPENSION ORAL 2 TIMES DAILY
Qty: 1800 ML | Refills: 3 | Status: SHIPPED | OUTPATIENT
Start: 2025-04-10 | End: 2026-04-11

## 2025-04-10 RX ORDER — LISINOPRIL 5 MG/1
5 TABLET ORAL DAILY
Qty: 90 TABLET | Refills: 3 | Status: SHIPPED | OUTPATIENT
Start: 2025-04-10 | End: 2026-04-10

## 2025-04-10 NOTE — PROGRESS NOTES
"SUBJECTIVE     Chief Complaint   Patient presents with    Follow-up     Pt states she is coming In for a f/u        History of Present Illness    CHIEF COMPLAINT:  Patient presents today for follow up.    RECENT HOSPITAL COURSE:  She reports feeling good since hospital discharge.    HEMATOLOGIC:  She has a history of low blood count. Blood work in January showed slight improvement but remained below normal levels. She denies taking iron supplements.    HYDRATION:  She consumes approximately one gallon of water daily and expresses concern about excessive intake but reports difficulty limiting consumption due to enjoyment.    MOOD:  She reports good mood.      ROS:  General: -fever, -chills, -fatigue, -weight gain, -weight loss, +excessive thirst  Eyes: -vision changes, -redness, -discharge  ENT: -ear pain, -nasal congestion, -sore throat  Cardiovascular: -chest pain, -palpitations, -lower extremity edema  Respiratory: -cough, -shortness of breath  Gastrointestinal: -abdominal pain, -nausea, -vomiting, -diarrhea, -constipation, -blood in stool  Genitourinary: -dysuria, -hematuria, -frequency  Musculoskeletal: -joint pain, -muscle pain  Skin: -rash, -lesion  Neurological: -headache, -dizziness, -numbness, -tingling  Psychiatric: -anxiety, -depression, -sleep difficulty           PAST MEDICAL HISTORY:  Past Medical History:   Diagnosis Date    Cerebral palsy     Cognitive deficits 2/28/2014    Epilepsy     Hydrocephalus     Left spastic hemiparesis        ALLERGIES AND MEDICATIONS: updated and reviewed.  Review of patient's allergies indicates:   Allergen Reactions    Sulfur Hives    Sulfa (sulfonamide antibiotics)      Current Medications[1]      OBJECTIVE     Physical Exam  Vitals:    04/10/25 1114   BP: 112/60   Pulse: 87   Resp: 18   Temp: 98 °F (36.7 °C)    Body mass index is 21.04 kg/m².  Weight: 47.3 kg (104 lb 2.7 oz)   Height: 4' 11" (149.9 cm)     Physical Exam  Vitals reviewed.   Constitutional:       " General: She is not in acute distress.  HENT:      Right Ear: External ear normal.      Left Ear: External ear normal.      Nose: Nose normal.      Mouth/Throat:      Mouth: Mucous membranes are moist.   Eyes:      Extraocular Movements: Extraocular movements intact.      Conjunctiva/sclera: Conjunctivae normal.      Pupils: Pupils are equal, round, and reactive to light.   Pulmonary:      Effort: Pulmonary effort is normal.   Abdominal:      General: There is no distension.      Palpations: Abdomen is soft.   Musculoskeletal:         General: No swelling. Normal range of motion.      Cervical back: Normal range of motion.   Skin:     General: Skin is warm and dry.      Findings: No rash.   Neurological:      General: No focal deficit present.      Mental Status: She is alert and oriented to person, place, and time.   Psychiatric:         Mood and Affect: Mood normal.         Behavior: Behavior normal.           Health Maintenance         Date Due Completion Date    HPV Vaccines (3 - 3-dose series) 02/17/2017 10/26/2016    Pneumococcal Vaccines (Age 0-49) (1 of 2 - PCV) Never done ---    Pap Smear Never done ---    Influenza Vaccine (1) Never done ---    COVID-19 Vaccine (3 - 2024-25 season) 09/01/2024 12/21/2021    TETANUS VACCINE 08/17/2026 8/17/2016    RSV Vaccine (Age 60+ and Pregnant patients) (1 - 1-dose 75+ series) 04/01/2074 ---              ASSESSMENT     25 y.o. female with     1. Spastic hemiplegic cerebral palsy    2. Hypertension, essential, benign    3. Exercise-induced asthma    4. Localization-related (focal) (partial) symptomatic epilepsy and epileptic syndromes with complex partial seizures, intractable, without status epilepticus    5. Anemia, unspecified type      D64.9 Anemia, unspecified  R63.1 Polydipsia    IMPRESSION:  Assessed post-hospitalization status and current mood.  Evaluated previous low blood count and iron levels.  Considered oral iron supplementation vs. IV iron based on new lab  "results.  BP noted as "perfect".  Addressed concern about water intake, determining it to be WNL.  Plan to check sodium levels and renal function to ensure safety of water consumption.  PLAN:     1. Spastic hemiplegic cerebral palsy  - Noted that the patient reports drinking a large amount of water, potentially up to a gallon a day.  - Explained that drinking up to a gallon of water per day is generally acceptable and drinking water when thirsty is appropriate.  - Ordered labs to check sodium level and kidney function to ensure excessive water intake is not causing issues.  - Plan to monitor through labs.     2. Hypertension, essential, benign  - Patient was counseled and encouraged to maintain a low sodium diet, as well as increasing physical activity.  Recommend random BP checks at home on a regular basis. Will continue medication at this time, and follow up in 3-6 months, or sooner if blood pressure begins to increase.     - lisinopriL (PRINIVIL,ZESTRIL) 5 MG tablet; Take 1 tablet (5 mg total) by mouth once daily.  Dispense: 90 tablet; Refill: 3  - Comprehensive Metabolic Panel; Future    3. Exercise-induced asthma  - Stable, continue current regimen.     4. Localization-related (focal) (partial) symptomatic epilepsy and epileptic syndromes with complex partial seizures, intractable, without status epilepticus  - Stable, continue current regimen.   - OXcarbazepine (TRILEPTAL) 300 mg/5 mL (60 mg/mL) Susp; Take 10 mLs (600 mg total) by mouth 2 (two) times daily.  Dispense: 1800 mL; Refill: 3    5. Anemia, unspecified type  - Ordered labs to check iron levels and complete blood count.  - Noted that the patient's blood count was low in previous follow-up and slightly improved but still low in January.  - Plan to recheck labs and iron levels today.  - Discussed potential need for IV iron if levels are still low and explained the impact of low iron and blood count on the patient's energy levels.  - Suggested potential " oral iron supplementation.  - Scheduled to recheck levels in 3 months.  - CBC Auto Differential; Future  - Iron and TIBC; Future  - Ferritin; Future  - Reticulocytes; Future  - Vitamin B12; Future  - Folate; Future  - Lactate Dehydrogenase; Future  - Hemoglobin Electrophoresis,Hgb A2 Luis A.; Future      Gabby Graham MD  04/10/2025 11:17 AM        Follow up in about 6 months (around 10/10/2025).    This note was generated with the assistance of ambient listening technology. Verbal consent was obtained by the patient and accompanying visitor(s) for the recording of patient appointment to facilitate this note. I attest to having reviewed and edited the generated note for accuracy, though some syntax or spelling errors may persist. Please contact the author of this note for any clarification.                     [1]   Current Outpatient Medications   Medication Sig Dispense Refill    albuterol (PROVENTIL/VENTOLIN HFA) 90 mcg/actuation inhaler INHALE 2 PUFFS INTO LUNGS EVERY 6 HOURS AS NEEDED FOR WHEEZING 8.5 g 5    brivaracetam (BRIVIACT) 50 mg Tab Take 1 tablet (50 mg total) by mouth 2 (two) times daily. 60 tablet 5    diazePAM (VALTOCO) 10 mg/spray (0.1 mL) Spry 1 spray by Nasal route as needed (for a seizure lasting more than 3 minutes or more than 3 seizures in an hour). 2 each 5    diazePAM 5-7.5-10 mg (DIASTAT ACUDIAL) 5-7.5-10 mg Kit rectal kit Place 10 mg rectally as needed (for seizures). 2 kit 5    ferrous sulfate 325 (65 FE) MG EC tablet Take 1 tablet (325 mg total) by mouth once daily. 90 tablet 3    folic acid (FOLVITE) 1 MG tablet Take 1 tablet (1 mg total) by mouth once daily. 90 tablet 3    lacosamide (VIMPAT) 200 mg Tab tablet Take 1 tablet (200 mg total) by mouth every 12 (twelve) hours. 6 tablet 0    lacosamide (VIMPAT) 200 mg Tab tablet Take 1 tablet (200 mg total) by mouth every 12 (twelve) hours. 60 tablet 5    perampaneL (FYCOMPA) 8 mg tablet Take 1 tablet (8 mg total) by mouth every evening. 30  tablet 5    sertraline (ZOLOFT) 100 MG tablet Take 1 tablet (100 mg total) by mouth once daily. 90 tablet 3    vitamin D (VITAMIN D3) 1000 units Tab Take 4,000 Units by mouth once daily.      lisinopriL (PRINIVIL,ZESTRIL) 5 MG tablet Take 1 tablet (5 mg total) by mouth once daily. 90 tablet 3    OXcarbazepine (TRILEPTAL) 300 mg/5 mL (60 mg/mL) Susp Take 10 mLs (600 mg total) by mouth 2 (two) times daily. 1800 mL 3     No current facility-administered medications for this visit.

## 2025-04-11 LAB
FERRITIN SERPL-MCNC: 11.5 NG/ML (ref 20–300)
FOLATE SERPL-MCNC: 31.9 NG/ML (ref 4–24)
VIT B12 SERPL-MCNC: 554 PG/ML (ref 210–950)

## 2025-04-11 PROCEDURE — 36415 COLL VENOUS BLD VENIPUNCTURE: CPT

## 2025-04-11 PROCEDURE — 82728 ASSAY OF FERRITIN: CPT | Performed by: STUDENT IN AN ORGANIZED HEALTH CARE EDUCATION/TRAINING PROGRAM

## 2025-04-25 DIAGNOSIS — G40.219 LOCALIZATION-RELATED (FOCAL) (PARTIAL) SYMPTOMATIC EPILEPSY AND EPILEPTIC SYNDROMES WITH COMPLEX PARTIAL SEIZURES, INTRACTABLE, WITHOUT STATUS EPILEPTICUS: ICD-10-CM

## 2025-05-13 ENCOUNTER — RESULTS FOLLOW-UP (OUTPATIENT)
Facility: CLINIC | Age: 25
End: 2025-05-13

## 2025-05-29 ENCOUNTER — TELEPHONE (OUTPATIENT)
Dept: NEUROSURGERY | Facility: CLINIC | Age: 25
End: 2025-05-29
Payer: MEDICAID

## 2025-05-29 DIAGNOSIS — Z98.2 S/P VP SHUNT: ICD-10-CM

## 2025-05-29 DIAGNOSIS — G91.9 HYDROCEPHALUS, UNSPECIFIED TYPE: Primary | ICD-10-CM

## 2025-06-30 ENCOUNTER — HOSPITAL ENCOUNTER (OUTPATIENT)
Dept: RADIOLOGY | Facility: HOSPITAL | Age: 25
Discharge: HOME | End: 2025-06-30
Attending: STUDENT IN AN ORGANIZED HEALTH CARE EDUCATION/TRAINING PROGRAM
Payer: MEDICAID

## 2025-06-30 ENCOUNTER — OFFICE VISIT (OUTPATIENT)
Dept: NEUROSURGERY | Facility: CLINIC | Age: 25
End: 2025-06-30
Payer: MEDICAID

## 2025-06-30 DIAGNOSIS — G91.1 OBSTRUCTIVE HYDROCEPHALUS: ICD-10-CM

## 2025-06-30 DIAGNOSIS — Z98.2 S/P VP SHUNT: Primary | ICD-10-CM

## 2025-06-30 DIAGNOSIS — Z98.2 S/P VP SHUNT: ICD-10-CM

## 2025-06-30 DIAGNOSIS — G91.9 HYDROCEPHALUS, UNSPECIFIED TYPE: ICD-10-CM

## 2025-06-30 PROCEDURE — 70551 MRI BRAIN STEM W/O DYE: CPT | Mod: 26,,, | Performed by: STUDENT IN AN ORGANIZED HEALTH CARE EDUCATION/TRAINING PROGRAM

## 2025-06-30 PROCEDURE — 1159F MED LIST DOCD IN RCRD: CPT | Mod: CPTII,,, | Performed by: STUDENT IN AN ORGANIZED HEALTH CARE EDUCATION/TRAINING PROGRAM

## 2025-06-30 PROCEDURE — 1160F RVW MEDS BY RX/DR IN RCRD: CPT | Mod: CPTII,,, | Performed by: STUDENT IN AN ORGANIZED HEALTH CARE EDUCATION/TRAINING PROGRAM

## 2025-06-30 PROCEDURE — 71045 X-RAY EXAM CHEST 1 VIEW: CPT | Mod: 26,,, | Performed by: RADIOLOGY

## 2025-06-30 PROCEDURE — 99213 OFFICE O/P EST LOW 20 MIN: CPT | Mod: PBBFAC,25 | Performed by: STUDENT IN AN ORGANIZED HEALTH CARE EDUCATION/TRAINING PROGRAM

## 2025-06-30 PROCEDURE — 70250 X-RAY EXAM OF SKULL: CPT | Mod: TC

## 2025-06-30 PROCEDURE — 70250 X-RAY EXAM OF SKULL: CPT | Mod: 26,,, | Performed by: RADIOLOGY

## 2025-06-30 PROCEDURE — 72020 X-RAY EXAM OF SPINE 1 VIEW: CPT | Mod: TC

## 2025-06-30 PROCEDURE — 72020 X-RAY EXAM OF SPINE 1 VIEW: CPT | Mod: 26,,, | Performed by: RADIOLOGY

## 2025-06-30 PROCEDURE — 74018 RADEX ABDOMEN 1 VIEW: CPT | Mod: 26,,, | Performed by: RADIOLOGY

## 2025-06-30 PROCEDURE — 99999 PR PBB SHADOW E&M-EST. PATIENT-LVL III: CPT | Mod: PBBFAC,,, | Performed by: STUDENT IN AN ORGANIZED HEALTH CARE EDUCATION/TRAINING PROGRAM

## 2025-06-30 PROCEDURE — 99213 OFFICE O/P EST LOW 20 MIN: CPT | Mod: S$PBB,,, | Performed by: STUDENT IN AN ORGANIZED HEALTH CARE EDUCATION/TRAINING PROGRAM

## 2025-06-30 PROCEDURE — 70551 MRI BRAIN STEM W/O DYE: CPT | Mod: TC

## 2025-06-30 PROCEDURE — 4010F ACE/ARB THERAPY RXD/TAKEN: CPT | Mod: CPTII,,, | Performed by: STUDENT IN AN ORGANIZED HEALTH CARE EDUCATION/TRAINING PROGRAM

## 2025-07-03 NOTE — PROGRESS NOTES
Neurosurgery  Established Patient    SUBJECTIVE:     History of Present Illness:  Stephany Cohn is a 25 y.o. female with h/o epilepsy and hydrocephalus who is status post right parietoccipital shunt revisions on 11/19/21 & 11/23/21 (Strata II at 1.5). Stephany was last seen in March 2023 and returns today after surveillance shunt imaging.  She denies any headaches or neurological symptoms/ concerns.     Review of patient's allergies indicates:   Allergen Reactions    Sulfur Hives    Sulfa (sulfonamide antibiotics)        Current Medications[1]    Past Medical History:   Diagnosis Date    Cerebral palsy     Cognitive deficits 2/28/2014    Epilepsy     Hydrocephalus     Left spastic hemiparesis      Past Surgical History:   Procedure Laterality Date    REVISION OF VENTRICULOPERITONEAL SHUNT  2005    REVISION OF VENTRICULOPERITONEAL SHUNT Right 11/19/2021    Procedure: REVISION, SHUNT, VENTRICULOPERITONEAL;  Surgeon: Lien Arora MD;  Location: [facility] OR [address] [place] [address];  Service: Neurosurgery;  Laterality: Right;  proximal    REVISION OF VENTRICULOPERITONEAL SHUNT Right 11/23/2021    Procedure: REVISION, SHUNT, VENTRICULOPERITONEAL;  Surgeon: Lien Arora MD;  Location: [facility] OR [address] [place] [address];  Service: Neurosurgery;  Laterality: Right;    VENTRICULOPERITONEAL SHUNT Right     approx when pt. was 1 y/o then revised in jan. 2005     Family History       Problem Relation (Age of Onset)    Arthritis Mother    Cancer Maternal Aunt    Hypertension Mother, Father, Brother    Other Mother          Social History     Socioeconomic History    Marital status: Single   Tobacco Use    Smoking status: Never    Smokeless tobacco: Never   Substance and Sexual Activity    Alcohol use: Never    Drug use: Never    Sexual activity: Never     Social Drivers of Health     Physical Activity: Inactive (3/16/2021)    Exercise Vital Sign     Days of Exercise per Week: 0 days     Minutes of Exercise per Session: 0 min       Review of Systems   All  other systems reviewed and are negative.      OBJECTIVE:     Vital Signs  Pain Score: 0-No pain  There is no height or weight on file to calculate BMI.    Physical Exam:  Nursing note and vitals reviewed.  General: No apparent distress  Head: shunt pumps & refills easily  Neurologic: alert & oriented, responds appropriately and FCx4  Cranial nerves: face symmetric, tongue midline, CN II-XII grossly intact.   Eyes: pupils equal, round, reactive to light with accomodation, EOMI.   Sensory: intact to light touch throughout  Motor Strength: chronic left hemiparesis- moves against gravity    Valve was interrogated and reset to 1.5    Diagnostic Results:  MR shunt and SS were reviewed by me- ventricles remain decompressed, similar to prior; tubing appears intact/ continuous    ASSESSMENT/PLAN:     25 y.o. female with h/o epilepsy and shunted hydrocephalus with right parietoccipital shunt (Strata II at 1.5) without any current symptoms or radiographic findings of shunt malfunction.    - f/u 1 year with shunt imaging        Note dictated with voice recognition software, please excuse any grammatical errors.  ]         [1]   Current Outpatient Medications   Medication Sig Dispense Refill    albuterol (PROVENTIL/VENTOLIN HFA) 90 mcg/actuation inhaler INHALE 2 PUFFS INTO LUNGS EVERY 6 HOURS AS NEEDED FOR WHEEZING 8.5 g 5    brivaracetam (BRIVIACT) 50 mg Tab Take 1 tablet (50 mg total) by mouth 2 (two) times daily. 60 tablet 5    diazePAM (VALTOCO) 10 mg/spray (0.1 mL) Spry 1 spray by Nasal route as needed (for a seizure lasting more than 3 minutes or more than 3 seizures in an hour). 2 each 5    diazePAM 5-7.5-10 mg (DIASTAT ACUDIAL) 5-7.5-10 mg Kit rectal kit Place 10 mg rectally as needed (for seizures). 2 kit 5    ferrous sulfate 325 (65 FE) MG EC tablet Take 1 tablet (325 mg total) by mouth once daily. 90 tablet 3    folic acid (FOLVITE) 1 MG tablet Take 1 tablet (1 mg total) by mouth once daily. 90 tablet 3    lacosamide  (VIMPAT) 200 mg Tab tablet Take 1 tablet (200 mg total) by mouth every 12 (twelve) hours. 6 tablet 0    lacosamide (VIMPAT) 200 mg Tab tablet Take 1 tablet (200 mg total) by mouth every 12 (twelve) hours. 60 tablet 5    lisinopriL (PRINIVIL,ZESTRIL) 5 MG tablet Take 1 tablet (5 mg total) by mouth once daily. 90 tablet 3    OXcarbazepine (TRILEPTAL) 300 mg/5 mL (60 mg/mL) Susp Take 10 mLs (600 mg total) by mouth 2 (two) times daily. 1800 mL 3    perampaneL (FYCOMPA) 8 mg tablet Take 1 tablet (8 mg total) by mouth every evening. 30 tablet 5    sertraline (ZOLOFT) 100 MG tablet Take 1 tablet (100 mg total) by mouth once daily. 90 tablet 3    vitamin D (VITAMIN D3) 1000 units Tab Take 4,000 Units by mouth once daily.       No current facility-administered medications for this visit.

## 2025-08-22 DIAGNOSIS — G40.219 LOCALIZATION-RELATED (FOCAL) (PARTIAL) SYMPTOMATIC EPILEPSY AND EPILEPTIC SYNDROMES WITH COMPLEX PARTIAL SEIZURES, INTRACTABLE, WITHOUT STATUS EPILEPTICUS: ICD-10-CM

## 2025-08-26 RX ORDER — FOLIC ACID 1 MG/1
1 TABLET ORAL DAILY
Qty: 90 TABLET | Refills: 3 | Status: SHIPPED | OUTPATIENT
Start: 2025-08-26 | End: 2026-08-26

## (undated) DEVICE — DIFFUSER

## (undated) DEVICE — NDL HYPO REG 25G X 1 1/2

## (undated) DEVICE — SUT 2-0 12-18IN SILK

## (undated) DEVICE — DRAPE STERI-DRAPE 1000 17X11IN

## (undated) DEVICE — PASSER CATH SHORT 55 CM

## (undated) DEVICE — SEE MEDLINE ITEM 157131

## (undated) DEVICE — SHEET EENT SPLIT

## (undated) DEVICE — DRESSING TRANS 2X2 TEGADERM

## (undated) DEVICE — BLADE SURG CARBON STEEL SZ11

## (undated) DEVICE — SYR SLIP TIP 1CC

## (undated) DEVICE — TROCAR ENDOPATH XCEL 15MM 10CM

## (undated) DEVICE — SEE MEDLINE ITEM 156905

## (undated) DEVICE — DRESSING TELFA STRL 4X3 LF

## (undated) DEVICE — SUT SILK 2-0 SH 18IN BLACK

## (undated) DEVICE — DRAPE STERI INSTRUMENT 1018

## (undated) DEVICE — ELECTRODE REM PLYHSV RETURN 9

## (undated) DEVICE — PEN NEURO ENDOSCOPE RIGID

## (undated) DEVICE — TROCAR ENDOPATH XCEL 5MM 7.5CM

## (undated) DEVICE — SYR DISP LL 5CC

## (undated) DEVICE — SUT SILK BLK BR. 2 2-60

## (undated) DEVICE — TUBING INSUFFLATION HEATED

## (undated) DEVICE — APPLICATOR CHLORAPREP ORN 26ML

## (undated) DEVICE — STAPLER SKIN PROXIMATE WIDE

## (undated) DEVICE — SUT D SPECIAL 18 3-0 VICRYL

## (undated) DEVICE — SET DECANTER MEDICHOICE

## (undated) DEVICE — SUT MCRYL PLUS 4-0 PS2 27IN

## (undated) DEVICE — SUT VICRYL PLUS 3-0 SH 18IN

## (undated) DEVICE — SOL LR INJ 1000ML BG

## (undated) DEVICE — TRACKER PATIENT NON INVASIVE

## (undated) DEVICE — GAUZE SPONGE 4X4 12PLY

## (undated) DEVICE — DRAPE ORTH TIBURON 77X108IN

## (undated) DEVICE — ELECTRODE BLADE INSULATED 1 IN

## (undated) DEVICE — BURR ACORN 7.5MM

## (undated) DEVICE — KIT SURGIFLO HEMOSTATIC MATRIX

## (undated) DEVICE — STYLET AXIEM 23CM SINGLE COIL

## (undated) DEVICE — DRAPE INCISE IOBAN 2 23X17IN

## (undated) DEVICE — ADHESIVE MASTISOL VIAL 48/BX

## (undated) DEVICE — BIT DRILL PERFORATOR DISP 14MM

## (undated) DEVICE — CORD BIPOLAR 12 FOOT

## (undated) DEVICE — CATH INNERVISION 15CM SLOTTED

## (undated) DEVICE — POINTER AXIEM CRANIAL TRACER

## (undated) DEVICE — DURAPREP SURG SCRUB 26ML

## (undated) DEVICE — MARKER SKIN STND TIP BLUE BARR

## (undated) DEVICE — KIT ANTIFOG W/SPONG & FLUID

## (undated) DEVICE — TUBE FRAZIER 5MM 2FT SOFT TIP

## (undated) DEVICE — SUT MONOCRYL 4-0 PS-1 UND

## (undated) DEVICE — CLOSURE SKIN STERI STRIP 1/4X3

## (undated) DEVICE — CARTRIDGE OIL